# Patient Record
Sex: FEMALE | Race: WHITE | Employment: OTHER | URBAN - NONMETROPOLITAN AREA
[De-identification: names, ages, dates, MRNs, and addresses within clinical notes are randomized per-mention and may not be internally consistent; named-entity substitution may affect disease eponyms.]

---

## 2017-07-31 RX ORDER — LEVOTHYROXINE SODIUM 0.05 MG/1
50 TABLET ORAL DAILY
COMMUNITY
End: 2017-09-27 | Stop reason: DRUGHIGH

## 2017-07-31 RX ORDER — TRIAMTERENE AND HYDROCHLOROTHIAZIDE 37.5; 25 MG/1; MG/1
1 TABLET ORAL DAILY
COMMUNITY
End: 2017-09-27 | Stop reason: ALTCHOICE

## 2017-09-27 ENCOUNTER — HOSPITAL ENCOUNTER (OUTPATIENT)
Dept: PREADMISSION TESTING | Age: 82
Discharge: HOME | End: 2017-09-27
Payer: MEDICARE

## 2017-09-27 VITALS — WEIGHT: 135 LBS | BODY MASS INDEX: 23.05 KG/M2 | HEIGHT: 64 IN

## 2017-09-27 LAB
ANION GAP SERPL CALCULATED.3IONS-SCNC: 13 MMOL/L (ref 7–19)
APTT: 29.9 SEC (ref 26–36.2)
BASOPHILS ABSOLUTE: 0.1 K/UL (ref 0–0.2)
BASOPHILS RELATIVE PERCENT: 1.3 % (ref 0–1)
BUN BLDV-MCNC: 20 MG/DL (ref 8–23)
CALCIUM SERPL-MCNC: 9.9 MG/DL (ref 8.8–10.2)
CHLORIDE BLD-SCNC: 103 MMOL/L (ref 98–111)
CO2: 25 MMOL/L (ref 22–29)
CREAT SERPL-MCNC: 1.1 MG/DL (ref 0.5–0.9)
EKG P AXIS: 47 DEGREES
EKG P-R INTERVAL: 158 MS
EKG Q-T INTERVAL: 390 MS
EKG QRS DURATION: 74 MS
EKG QTC CALCULATION (BAZETT): 402 MS
EKG T AXIS: 22 DEGREES
EOSINOPHILS ABSOLUTE: 0.3 K/UL (ref 0–0.6)
EOSINOPHILS RELATIVE PERCENT: 3.8 % (ref 0–5)
GFR NON-AFRICAN AMERICAN: 47
GLUCOSE BLD-MCNC: 92 MG/DL (ref 74–109)
HCT VFR BLD CALC: 34.9 % (ref 37–47)
HEMOGLOBIN: 10.9 G/DL (ref 12–16)
INR BLD: 0.95 (ref 0.88–1.18)
LYMPHOCYTES ABSOLUTE: 2.1 K/UL (ref 1.1–4.5)
LYMPHOCYTES RELATIVE PERCENT: 29.6 % (ref 20–40)
MCH RBC QN AUTO: 29.1 PG (ref 27–31)
MCHC RBC AUTO-ENTMCNC: 31.2 G/DL (ref 33–37)
MCV RBC AUTO: 93.1 FL (ref 81–99)
MONOCYTES ABSOLUTE: 0.7 K/UL (ref 0–0.9)
MONOCYTES RELATIVE PERCENT: 10.2 % (ref 0–10)
NEUTROPHILS ABSOLUTE: 3.9 K/UL (ref 1.5–7.5)
NEUTROPHILS RELATIVE PERCENT: 54.7 % (ref 50–65)
PDW BLD-RTO: 14.6 % (ref 11.5–14.5)
PLATELET # BLD: 318 K/UL (ref 130–400)
PMV BLD AUTO: 9.7 FL (ref 9.4–12.3)
POTASSIUM SERPL-SCNC: 4.4 MMOL/L (ref 3.5–5)
PROTHROMBIN TIME: 12.6 SEC (ref 12–14.6)
RBC # BLD: 3.75 M/UL (ref 4.2–5.4)
SODIUM BLD-SCNC: 141 MMOL/L (ref 136–145)
WBC # BLD: 7.1 K/UL (ref 4.8–10.8)

## 2017-09-27 PROCEDURE — 87070 CULTURE OTHR SPECIMN AEROBIC: CPT

## 2017-09-27 PROCEDURE — 85025 COMPLETE CBC W/AUTO DIFF WBC: CPT

## 2017-09-27 PROCEDURE — 93005 ELECTROCARDIOGRAM TRACING: CPT

## 2017-09-27 PROCEDURE — 85610 PROTHROMBIN TIME: CPT

## 2017-09-27 PROCEDURE — 80048 BASIC METABOLIC PNL TOTAL CA: CPT

## 2017-09-27 PROCEDURE — 85730 THROMBOPLASTIN TIME PARTIAL: CPT

## 2017-09-27 RX ORDER — LEVOTHYROXINE SODIUM 0.05 MG/1
50 TABLET ORAL DAILY
COMMUNITY
End: 2019-12-02 | Stop reason: SDUPTHER

## 2017-09-27 RX ORDER — LISINOPRIL 40 MG/1
40 TABLET ORAL DAILY
COMMUNITY
End: 2019-04-19

## 2017-09-28 LAB — MRSA CULTURE ONLY: NORMAL

## 2017-09-29 ENCOUNTER — OFFICE VISIT (OUTPATIENT)
Dept: FAMILY MEDICINE CLINIC | Age: 82
End: 2017-09-29
Payer: MEDICARE

## 2017-09-29 VITALS
OXYGEN SATURATION: 96 % | HEART RATE: 85 BPM | WEIGHT: 140 LBS | RESPIRATION RATE: 18 BRPM | SYSTOLIC BLOOD PRESSURE: 138 MMHG | DIASTOLIC BLOOD PRESSURE: 82 MMHG | BODY MASS INDEX: 24.41 KG/M2 | TEMPERATURE: 98.5 F

## 2017-09-29 DIAGNOSIS — Z23 NEED FOR PROPHYLACTIC VACCINATION AGAINST STREPTOCOCCUS PNEUMONIAE (PNEUMOCOCCUS): ICD-10-CM

## 2017-09-29 DIAGNOSIS — I10 ESSENTIAL (PRIMARY) HYPERTENSION: Primary | ICD-10-CM

## 2017-09-29 DIAGNOSIS — H11.32 SUBCONJUNCTIVAL HEMORRHAGE OF LEFT EYE: ICD-10-CM

## 2017-09-29 PROBLEM — M15.9 PRIMARY OSTEOARTHRITIS INVOLVING MULTIPLE JOINTS: Status: ACTIVE | Noted: 2017-09-29

## 2017-09-29 PROBLEM — G89.29 CHRONIC MIDLINE LOW BACK PAIN WITHOUT SCIATICA: Status: ACTIVE | Noted: 2017-09-29

## 2017-09-29 PROBLEM — M54.50 CHRONIC MIDLINE LOW BACK PAIN WITHOUT SCIATICA: Status: ACTIVE | Noted: 2017-09-29

## 2017-09-29 PROBLEM — F33.42 RECURRENT MAJOR DEPRESSIVE DISORDER, IN FULL REMISSION (HCC): Status: ACTIVE | Noted: 2017-09-29

## 2017-09-29 PROBLEM — K21.9 GASTROESOPHAGEAL REFLUX DISEASE WITHOUT ESOPHAGITIS: Status: ACTIVE | Noted: 2017-09-29

## 2017-09-29 PROBLEM — Z78.9 STATIN INTOLERANCE: Status: ACTIVE | Noted: 2017-09-29

## 2017-09-29 PROBLEM — E78.01 FAMILIAL HYPERCHOLESTEROLEMIA: Status: ACTIVE | Noted: 2017-09-29

## 2017-09-29 PROBLEM — E06.3 HYPOTHYROIDISM DUE TO HASHIMOTO'S THYROIDITIS: Status: ACTIVE | Noted: 2017-09-29

## 2017-09-29 PROBLEM — E03.8 HYPOTHYROIDISM DUE TO HASHIMOTO'S THYROIDITIS: Status: ACTIVE | Noted: 2017-09-29

## 2017-09-29 PROBLEM — M15.0 PRIMARY OSTEOARTHRITIS INVOLVING MULTIPLE JOINTS: Status: ACTIVE | Noted: 2017-09-29

## 2017-09-29 PROCEDURE — G8400 PT W/DXA NO RESULTS DOC: HCPCS | Performed by: FAMILY MEDICINE

## 2017-09-29 PROCEDURE — 1123F ACP DISCUSS/DSCN MKR DOCD: CPT | Performed by: FAMILY MEDICINE

## 2017-09-29 PROCEDURE — G8427 DOCREV CUR MEDS BY ELIG CLIN: HCPCS | Performed by: FAMILY MEDICINE

## 2017-09-29 PROCEDURE — G8420 CALC BMI NORM PARAMETERS: HCPCS | Performed by: FAMILY MEDICINE

## 2017-09-29 PROCEDURE — 99213 OFFICE O/P EST LOW 20 MIN: CPT | Performed by: FAMILY MEDICINE

## 2017-09-29 PROCEDURE — 1036F TOBACCO NON-USER: CPT | Performed by: FAMILY MEDICINE

## 2017-09-29 PROCEDURE — 90732 PPSV23 VACC 2 YRS+ SUBQ/IM: CPT | Performed by: FAMILY MEDICINE

## 2017-09-29 PROCEDURE — 4040F PNEUMOC VAC/ADMIN/RCVD: CPT | Performed by: FAMILY MEDICINE

## 2017-09-29 PROCEDURE — 1090F PRES/ABSN URINE INCON ASSESS: CPT | Performed by: FAMILY MEDICINE

## 2017-09-29 PROCEDURE — G0009 ADMIN PNEUMOCOCCAL VACCINE: HCPCS | Performed by: FAMILY MEDICINE

## 2017-09-29 NOTE — MR AVS SNAPSHOT
After Visit Summary             Robert Huitron   2017 2:00 PM   Office Visit    Description:  Female : 1934   Provider:  Adele Doran MD   Department:  [address]              Your Follow-Up and Future Appointments         Below is a list of your follow-up and future appointments. This may not be a complete list as you may have made appointments directly with providers that we are not aware of or your providers may have made some for you. Please call your providers to confirm appointments. It is important to keep your appointments. Please bring your current insurance card, photo ID, co-pay, and all medication bottles to your appointment. If self-pay, payment is expected at the time of service. Your To-Do List     Future Appointments Provider Department Dept Phone    2018 1:00 PM Adele Doran MD [address] [phone]    If this is a sports or school physical please bring the physical form with you. Follow-Up    Return in about 3 months (around 2017) for AWV.          Information from Your Visit        Department     Name Address Phone Fax    [address] [address] [place] [address] [phone]      You Were Seen for:         Comments    Essential (primary) hypertension   [286361]         Vital Signs     Blood Pressure Pulse Temperature Respirations Weight Oxygen Saturation    138/82 85 98.5 °F (36.9 °C) 18 140 lb (63.5 kg) 96%    Body Mass Index Smoking Status                24.41 kg/m2 Former Smoker             Medications and Orders      Your Current Medications Are              lisinopril (PRINIVIL;ZESTRIL) 40 MG tablet Take 40 mg by mouth daily Indications: High Blood Pressure Disorder    levothyroxine (SYNTHROID) 50 MCG tablet Take 50 mcg by mouth Daily Indications: Underactive Thyroid    metoprolol tartrate (LOPRESSOR) 25 MG tablet Take 25 mg by mouth 2 times

## 2017-09-29 NOTE — PROGRESS NOTES
History   Substance Use Topics    Smoking status: Former Smoker     Packs/day: 1.00     Years: 17.00     Quit date: 5/25/1985    Smokeless tobacco: Never Used    Alcohol use Yes      Comment: rare      Current Outpatient Prescriptions   Medication Sig Dispense Refill    lisinopril (PRINIVIL;ZESTRIL) 40 MG tablet Take 40 mg by mouth daily Indications: High Blood Pressure Disorder      levothyroxine (SYNTHROID) 50 MCG tablet Take 50 mcg by mouth Daily Indications: Underactive Thyroid      metoprolol tartrate (LOPRESSOR) 25 MG tablet Take 25 mg by mouth 2 times daily Indications: High Blood Pressure Disorder       omeprazole (PRILOSEC) 40 MG capsule Take 40 mg by mouth daily Indications: Gastroesophageal Reflux Disease       hydrochlorothiazide (HYDRODIURIL) 12.5 MG tablet Take 12.5 mg by mouth daily as needed Indications: Fluid Retention       Acetaminophen (TYLENOL 8 HOUR PO) Take 2 tablets by mouth 3 times daily as needed Indications: Pain       Multiple Vitamin (MULTIVITAMIN PO) Take 1 tablet by mouth daily       DiphenhydrAMINE HCl (BENADRYL ALLERGY PO) Take 1 tablet by mouth daily as needed Indications: Seasonal Allergy        No current facility-administered medications for this visit. Allergies   Allergen Reactions    Demerol Other (See Comments)     Abdominal spasms with severe n/v/    Stadol [Butorphanol Tartrate]     Sulfa Antibiotics        Health Maintenance   Topic Date Due    DTaP/Tdap/Td vaccine (1 - Tdap) [birth year]/19/1953    Zostavax vaccine  03/19/1994    DEXA (modify frequency per FRAX score)  03/19/1999    Flu vaccine (1) 09/01/2017    Pneumococcal low/med risk  Completed       Subjective:      Review of Systems   Constitutional: Negative for chills and fever. HENT: Negative for congestion. Eyes: Negative for photophobia, pain, discharge, itching and visual disturbance. Respiratory: Negative for cough, chest tightness and shortness of breath.     Cardiovascular: Negative for chest pain, palpitations and leg swelling. Gastrointestinal: Negative for abdominal pain, anal bleeding, constipation, diarrhea and nausea. Genitourinary: Negative for difficulty urinating. Psychiatric/Behavioral: Negative. See HPI for visit specific review of symptoms. All others negative      Objective:   /82  Pulse 85  Temp 98.5 °F (36.9 °C)  Resp 18  Wt 140 lb (63.5 kg)  SpO2 96%  BMI 24.41 kg/m2  Physical Exam   Constitutional: She appears well-developed. She does not appear ill. Eyes: EOM and lids are normal. Pupils are equal, round, and reactive to light. Lids are everted and swept, no foreign bodies found. Right conjunctiva has no hemorrhage. Left conjunctiva has a hemorrhage. Neck: Normal range of motion. Neck supple. Cardiovascular: Normal rate and regular rhythm. Exam reveals no friction rub. No murmur heard. Pulmonary/Chest: Effort normal and breath sounds normal. No respiratory distress. She has no wheezes. She has no rales. Abdominal: Soft. Bowel sounds are normal. She exhibits no distension. There is no tenderness. There is no rebound and no guarding. Musculoskeletal: She exhibits no edema. Neurological: She is alert. Psychiatric: She has a normal mood and affect.  Her behavior is normal.         Recent Results (from the past 672 hour(s))   MRSA SCREENING CULTURE ONLY    Collection Time: 09/27/17 11:40 AM   Result Value Ref Range    MRSA Culture Only No MRSA detected on culture    CBC Auto Differential    Collection Time: 09/27/17 11:40 AM   Result Value Ref Range    WBC 7.1 4.8 - 10.8 K/uL    RBC 3.75 (L) 4.20 - 5.40 M/uL    Hemoglobin 10.9 (L) 12.0 - 16.0 g/dL    Hematocrit 34.9 (L) 37.0 - 47.0 %    MCV 93.1 81.0 - 99.0 fL    MCH 29.1 27.0 - 31.0 pg    MCHC 31.2 (L) 33.0 - 37.0 g/dL    RDW 14.6 (H) 11.5 - 14.5 %    Platelets 952 756 - 752 K/uL    MPV 9.7 9.4 - 12.3 fL    Neutrophils % 54.7 50.0 - 65.0 %    Lymphocytes % 29.6 20.0 - 40.0 % hemorrhage. It appears to be healing. The eyelid bruising is resolving as well. She has no other ocular symptoms including eye pain blurry vision or extraocular movement issues          Return in about 3 months (around 12/29/2017) for AWV. Discussed use, benefit, and side effects of prescribed medications. All patient questions answered. Pt voiced understanding. Reviewed health maintenance. Instructed to continue current medications, diet and exercise. Patient agreed with treatment plan. Follow up as directed.

## 2017-10-03 ASSESSMENT — ENCOUNTER SYMPTOMS
EYE ITCHING: 0
COUGH: 0
NAUSEA: 0
CONSTIPATION: 0
SHORTNESS OF BREATH: 0
EYE DISCHARGE: 0
ABDOMINAL PAIN: 0
EYE PAIN: 0
ANAL BLEEDING: 0
PHOTOPHOBIA: 0
CHEST TIGHTNESS: 0
DIARRHEA: 0

## 2017-10-17 ENCOUNTER — HOSPITAL ENCOUNTER (INPATIENT)
Age: 82
LOS: 1 days | Discharge: HOME HEALTH | DRG: 470 | End: 2017-10-18
Attending: ORTHOPAEDIC SURGERY | Admitting: ORTHOPAEDIC SURGERY
Payer: MEDICARE

## 2017-10-17 ENCOUNTER — ANESTHESIA EVENT (OUTPATIENT)
Dept: OPERATING ROOM | Age: 82
DRG: 470 | End: 2017-10-17
Payer: MEDICARE

## 2017-10-17 ENCOUNTER — ANESTHESIA (OUTPATIENT)
Dept: OPERATING ROOM | Age: 82
DRG: 470 | End: 2017-10-17
Payer: MEDICARE

## 2017-10-17 VITALS
OXYGEN SATURATION: 100 % | DIASTOLIC BLOOD PRESSURE: 54 MMHG | TEMPERATURE: 94.5 F | SYSTOLIC BLOOD PRESSURE: 118 MMHG | RESPIRATION RATE: 2 BRPM

## 2017-10-17 PROBLEM — D50.9 IRON DEFICIENCY ANEMIA: Status: ACTIVE | Noted: 2017-10-17

## 2017-10-17 LAB
ABO/RH: NORMAL
ANTIBODY SCREEN: NORMAL

## 2017-10-17 PROCEDURE — 3600000005 HC SURGERY LEVEL 5 BASE: Performed by: ORTHOPAEDIC SURGERY

## 2017-10-17 PROCEDURE — 3600000015 HC SURGERY LEVEL 5 ADDTL 15MIN: Performed by: ORTHOPAEDIC SURGERY

## 2017-10-17 PROCEDURE — C1776 JOINT DEVICE (IMPLANTABLE): HCPCS | Performed by: ORTHOPAEDIC SURGERY

## 2017-10-17 PROCEDURE — 6370000000 HC RX 637 (ALT 250 FOR IP): Performed by: ORTHOPAEDIC SURGERY

## 2017-10-17 PROCEDURE — 2720000001 HC MISC SURG SUPPLY STERILE $51-500: Performed by: ORTHOPAEDIC SURGERY

## 2017-10-17 PROCEDURE — 1210000000 HC MED SURG R&B

## 2017-10-17 PROCEDURE — A4364 ADHESIVE, LIQUID OR EQUAL: HCPCS | Performed by: ORTHOPAEDIC SURGERY

## 2017-10-17 PROCEDURE — C1713 ANCHOR/SCREW BN/BN,TIS/BN: HCPCS | Performed by: ORTHOPAEDIC SURGERY

## 2017-10-17 PROCEDURE — 6360000002 HC RX W HCPCS: Performed by: NURSE ANESTHETIST, CERTIFIED REGISTERED

## 2017-10-17 PROCEDURE — 2500000003 HC RX 250 WO HCPCS: Performed by: NURSE ANESTHETIST, CERTIFIED REGISTERED

## 2017-10-17 PROCEDURE — 94664 DEMO&/EVAL PT USE INHALER: CPT

## 2017-10-17 PROCEDURE — 2580000003 HC RX 258: Performed by: ORTHOPAEDIC SURGERY

## 2017-10-17 PROCEDURE — 86900 BLOOD TYPING SEROLOGIC ABO: CPT

## 2017-10-17 PROCEDURE — 3700000000 HC ANESTHESIA ATTENDED CARE: Performed by: ORTHOPAEDIC SURGERY

## 2017-10-17 PROCEDURE — 7100000000 HC PACU RECOVERY - FIRST 15 MIN: Performed by: ORTHOPAEDIC SURGERY

## 2017-10-17 PROCEDURE — 0SRD0J9 REPLACEMENT OF LEFT KNEE JOINT WITH SYNTHETIC SUBSTITUTE, CEMENTED, OPEN APPROACH: ICD-10-PCS | Performed by: ORTHOPAEDIC SURGERY

## 2017-10-17 PROCEDURE — 36415 COLL VENOUS BLD VENIPUNCTURE: CPT

## 2017-10-17 PROCEDURE — 6360000002 HC RX W HCPCS: Performed by: ORTHOPAEDIC SURGERY

## 2017-10-17 PROCEDURE — 97116 GAIT TRAINING THERAPY: CPT

## 2017-10-17 PROCEDURE — G8978 MOBILITY CURRENT STATUS: HCPCS

## 2017-10-17 PROCEDURE — 7100000001 HC PACU RECOVERY - ADDTL 15 MIN: Performed by: ORTHOPAEDIC SURGERY

## 2017-10-17 PROCEDURE — G8979 MOBILITY GOAL STATUS: HCPCS

## 2017-10-17 PROCEDURE — 2700000000 HC OXYGEN THERAPY PER DAY

## 2017-10-17 PROCEDURE — 2720000003 HC MISC SUTURE/STAPLES/RELOADS/ETC: Performed by: ORTHOPAEDIC SURGERY

## 2017-10-17 PROCEDURE — 3700000001 HC ADD 15 MINUTES (ANESTHESIA): Performed by: ORTHOPAEDIC SURGERY

## 2017-10-17 PROCEDURE — 2500000003 HC RX 250 WO HCPCS: Performed by: ORTHOPAEDIC SURGERY

## 2017-10-17 PROCEDURE — 86901 BLOOD TYPING SEROLOGIC RH(D): CPT

## 2017-10-17 PROCEDURE — 86850 RBC ANTIBODY SCREEN: CPT

## 2017-10-17 PROCEDURE — 97161 PT EVAL LOW COMPLEX 20 MIN: CPT

## 2017-10-17 DEVICE — DUP USE 338453 IMPL KNEE PSN ALL POLY PAT PLY 29MM: Type: IMPLANTABLE DEVICE | Site: KNEE | Status: FUNCTIONAL

## 2017-10-17 DEVICE — PSN TIB STM 5 DEG SZ C L: Type: IMPLANTABLE DEVICE | Site: KNEE | Status: FUNCTIONAL

## 2017-10-17 DEVICE — COMPONENT FEM SZ 7 NAR L KNEE CO CHROM CEM POST STBL COR: Type: IMPLANTABLE DEVICE | Site: KNEE | Status: FUNCTIONAL

## 2017-10-17 DEVICE — DISCONTINUED USE 416978 CEMENT PALACOS R SING DOSE 40GR: Type: IMPLANTABLE DEVICE | Site: KNEE | Status: FUNCTIONAL

## 2017-10-17 DEVICE — COMPONENT ARTC SURF PS 6-9 CD 12 MM LT TIB FIX BEAR: Type: IMPLANTABLE DEVICE | Site: KNEE | Status: FUNCTIONAL

## 2017-10-17 RX ORDER — HYDROCHLOROTHIAZIDE 25 MG/1
12.5 TABLET ORAL DAILY
Status: DISCONTINUED | OUTPATIENT
Start: 2017-10-17 | End: 2017-10-18 | Stop reason: HOSPADM

## 2017-10-17 RX ORDER — MORPHINE SULFATE 4 MG/ML
2 INJECTION, SOLUTION INTRAMUSCULAR; INTRAVENOUS EVERY 5 MIN PRN
Status: DISCONTINUED | OUTPATIENT
Start: 2017-10-17 | End: 2017-10-17 | Stop reason: HOSPADM

## 2017-10-17 RX ORDER — ACETAMINOPHEN 325 MG/1
650 TABLET ORAL EVERY 4 HOURS PRN
Status: DISCONTINUED | OUTPATIENT
Start: 2017-10-17 | End: 2017-10-18 | Stop reason: HOSPADM

## 2017-10-17 RX ORDER — ENALAPRILAT 2.5 MG/2ML
1.25 INJECTION INTRAVENOUS
Status: DISCONTINUED | OUTPATIENT
Start: 2017-10-17 | End: 2017-10-17 | Stop reason: HOSPADM

## 2017-10-17 RX ORDER — OMEPRAZOLE 40 MG/1
40 CAPSULE, DELAYED RELEASE ORAL DAILY
Status: DISCONTINUED | OUTPATIENT
Start: 2017-10-17 | End: 2017-10-17 | Stop reason: CLARIF

## 2017-10-17 RX ORDER — MORPHINE SULFATE 4 MG/ML
2 INJECTION, SOLUTION INTRAMUSCULAR; INTRAVENOUS
Status: DISCONTINUED | OUTPATIENT
Start: 2017-10-17 | End: 2017-10-18 | Stop reason: HOSPADM

## 2017-10-17 RX ORDER — SODIUM CHLORIDE 0.9 % (FLUSH) 0.9 %
10 SYRINGE (ML) INJECTION EVERY 12 HOURS SCHEDULED
Status: DISCONTINUED | OUTPATIENT
Start: 2017-10-17 | End: 2017-10-18 | Stop reason: HOSPADM

## 2017-10-17 RX ORDER — HYDRALAZINE HYDROCHLORIDE 20 MG/ML
5 INJECTION INTRAMUSCULAR; INTRAVENOUS EVERY 10 MIN PRN
Status: DISCONTINUED | OUTPATIENT
Start: 2017-10-17 | End: 2017-10-17 | Stop reason: HOSPADM

## 2017-10-17 RX ORDER — ASPIRIN 81 MG/1
81 TABLET ORAL 2 TIMES DAILY
Status: DISCONTINUED | OUTPATIENT
Start: 2017-10-17 | End: 2017-10-18 | Stop reason: HOSPADM

## 2017-10-17 RX ORDER — SCOLOPAMINE TRANSDERMAL SYSTEM 1 MG/1
1 PATCH, EXTENDED RELEASE TRANSDERMAL
Status: DISCONTINUED | OUTPATIENT
Start: 2017-10-17 | End: 2017-10-17

## 2017-10-17 RX ORDER — DOCUSATE SODIUM 100 MG/1
100 CAPSULE, LIQUID FILLED ORAL 2 TIMES DAILY
Status: DISCONTINUED | OUTPATIENT
Start: 2017-10-17 | End: 2017-10-18 | Stop reason: HOSPADM

## 2017-10-17 RX ORDER — SODIUM CHLORIDE 0.9 % (FLUSH) 0.9 %
10 SYRINGE (ML) INJECTION EVERY 12 HOURS SCHEDULED
Status: DISCONTINUED | OUTPATIENT
Start: 2017-10-17 | End: 2017-10-17 | Stop reason: HOSPADM

## 2017-10-17 RX ORDER — GLYCOPYRROLATE 0.2 MG/ML
INJECTION INTRAMUSCULAR; INTRAVENOUS PRN
Status: DISCONTINUED | OUTPATIENT
Start: 2017-10-17 | End: 2017-10-17 | Stop reason: SDUPTHER

## 2017-10-17 RX ORDER — LIDOCAINE HYDROCHLORIDE 10 MG/ML
1 INJECTION, SOLUTION EPIDURAL; INFILTRATION; INTRACAUDAL; PERINEURAL
Status: DISCONTINUED | OUTPATIENT
Start: 2017-10-17 | End: 2017-10-17 | Stop reason: HOSPADM

## 2017-10-17 RX ORDER — FENTANYL CITRATE 50 UG/ML
INJECTION, SOLUTION INTRAMUSCULAR; INTRAVENOUS PRN
Status: DISCONTINUED | OUTPATIENT
Start: 2017-10-17 | End: 2017-10-17 | Stop reason: SDUPTHER

## 2017-10-17 RX ORDER — SODIUM CHLORIDE 0.9 % (FLUSH) 0.9 %
10 SYRINGE (ML) INJECTION PRN
Status: DISCONTINUED | OUTPATIENT
Start: 2017-10-17 | End: 2017-10-17 | Stop reason: HOSPADM

## 2017-10-17 RX ORDER — MIDAZOLAM HYDROCHLORIDE 1 MG/ML
2 INJECTION INTRAMUSCULAR; INTRAVENOUS
Status: DISCONTINUED | OUTPATIENT
Start: 2017-10-17 | End: 2017-10-17 | Stop reason: HOSPADM

## 2017-10-17 RX ORDER — TRANEXAMIC ACID 650 1/1
1950 TABLET ORAL ONCE
Status: COMPLETED | OUTPATIENT
Start: 2017-10-17 | End: 2017-10-17

## 2017-10-17 RX ORDER — OXYCODONE HYDROCHLORIDE AND ACETAMINOPHEN 5; 325 MG/1; MG/1
1 TABLET ORAL EVERY 4 HOURS PRN
Status: DISCONTINUED | OUTPATIENT
Start: 2017-10-17 | End: 2017-10-18 | Stop reason: HOSPADM

## 2017-10-17 RX ORDER — ACETAMINOPHEN 500 MG
1000 TABLET ORAL ONCE
Status: COMPLETED | OUTPATIENT
Start: 2017-10-17 | End: 2017-10-17

## 2017-10-17 RX ORDER — DEXAMETHASONE SODIUM PHOSPHATE 10 MG/ML
10 INJECTION INTRAMUSCULAR; INTRAVENOUS ONCE
Status: DISCONTINUED | OUTPATIENT
Start: 2017-10-17 | End: 2017-10-17

## 2017-10-17 RX ORDER — ROPIVACAINE HYDROCHLORIDE 5 MG/ML
INJECTION, SOLUTION EPIDURAL; INFILTRATION; PERINEURAL PRN
Status: DISCONTINUED | OUTPATIENT
Start: 2017-10-17 | End: 2017-10-17 | Stop reason: HOSPADM

## 2017-10-17 RX ORDER — ONDANSETRON 2 MG/ML
4 INJECTION INTRAMUSCULAR; INTRAVENOUS EVERY 6 HOURS PRN
Status: DISCONTINUED | OUTPATIENT
Start: 2017-10-17 | End: 2017-10-18 | Stop reason: HOSPADM

## 2017-10-17 RX ORDER — PANTOPRAZOLE SODIUM 40 MG/1
40 TABLET, DELAYED RELEASE ORAL
Status: DISCONTINUED | OUTPATIENT
Start: 2017-10-18 | End: 2017-10-18 | Stop reason: HOSPADM

## 2017-10-17 RX ORDER — SODIUM CHLORIDE 9 MG/ML
INJECTION, SOLUTION INTRAVENOUS CONTINUOUS
Status: DISCONTINUED | OUTPATIENT
Start: 2017-10-17 | End: 2017-10-18 | Stop reason: HOSPADM

## 2017-10-17 RX ORDER — 0.9 % SODIUM CHLORIDE 0.9 %
500 INTRAVENOUS SOLUTION INTRAVENOUS PRN
Status: DISCONTINUED | OUTPATIENT
Start: 2017-10-17 | End: 2017-10-18 | Stop reason: HOSPADM

## 2017-10-17 RX ORDER — PROPOFOL 10 MG/ML
INJECTION, EMULSION INTRAVENOUS PRN
Status: DISCONTINUED | OUTPATIENT
Start: 2017-10-17 | End: 2017-10-17 | Stop reason: SDUPTHER

## 2017-10-17 RX ORDER — ROCURONIUM BROMIDE 10 MG/ML
INJECTION, SOLUTION INTRAVENOUS PRN
Status: DISCONTINUED | OUTPATIENT
Start: 2017-10-17 | End: 2017-10-17 | Stop reason: SDUPTHER

## 2017-10-17 RX ORDER — DIPHENHYDRAMINE HYDROCHLORIDE 50 MG/ML
12.5 INJECTION INTRAMUSCULAR; INTRAVENOUS
Status: DISCONTINUED | OUTPATIENT
Start: 2017-10-17 | End: 2017-10-17 | Stop reason: HOSPADM

## 2017-10-17 RX ORDER — OXYCODONE HCL 10 MG/1
10 TABLET, FILM COATED, EXTENDED RELEASE ORAL ONCE
Status: COMPLETED | OUTPATIENT
Start: 2017-10-17 | End: 2017-10-17

## 2017-10-17 RX ORDER — SODIUM CHLORIDE, SODIUM LACTATE, POTASSIUM CHLORIDE, CALCIUM CHLORIDE 600; 310; 30; 20 MG/100ML; MG/100ML; MG/100ML; MG/100ML
INJECTION, SOLUTION INTRAVENOUS CONTINUOUS
Status: DISCONTINUED | OUTPATIENT
Start: 2017-10-17 | End: 2017-10-17

## 2017-10-17 RX ORDER — PROMETHAZINE HYDROCHLORIDE 25 MG/ML
6.25 INJECTION, SOLUTION INTRAMUSCULAR; INTRAVENOUS
Status: DISCONTINUED | OUTPATIENT
Start: 2017-10-17 | End: 2017-10-17 | Stop reason: HOSPADM

## 2017-10-17 RX ORDER — MORPHINE SULFATE 4 MG/ML
4 INJECTION, SOLUTION INTRAMUSCULAR; INTRAVENOUS
Status: DISCONTINUED | OUTPATIENT
Start: 2017-10-17 | End: 2017-10-18 | Stop reason: HOSPADM

## 2017-10-17 RX ORDER — ONDANSETRON 2 MG/ML
INJECTION INTRAMUSCULAR; INTRAVENOUS PRN
Status: DISCONTINUED | OUTPATIENT
Start: 2017-10-17 | End: 2017-10-17 | Stop reason: SDUPTHER

## 2017-10-17 RX ORDER — EPHEDRINE SULFATE 50 MG/ML
INJECTION, SOLUTION INTRAVENOUS PRN
Status: DISCONTINUED | OUTPATIENT
Start: 2017-10-17 | End: 2017-10-17 | Stop reason: SDUPTHER

## 2017-10-17 RX ORDER — SODIUM CHLORIDE 0.9 % (FLUSH) 0.9 %
10 SYRINGE (ML) INJECTION PRN
Status: DISCONTINUED | OUTPATIENT
Start: 2017-10-17 | End: 2017-10-18 | Stop reason: HOSPADM

## 2017-10-17 RX ORDER — METOCLOPRAMIDE HYDROCHLORIDE 5 MG/ML
10 INJECTION INTRAMUSCULAR; INTRAVENOUS
Status: DISCONTINUED | OUTPATIENT
Start: 2017-10-17 | End: 2017-10-17 | Stop reason: HOSPADM

## 2017-10-17 RX ORDER — LEVOTHYROXINE SODIUM 0.03 MG/1
50 TABLET ORAL DAILY
Status: DISCONTINUED | OUTPATIENT
Start: 2017-10-17 | End: 2017-10-18 | Stop reason: HOSPADM

## 2017-10-17 RX ORDER — DEXAMETHASONE SODIUM PHOSPHATE 10 MG/ML
INJECTION INTRAMUSCULAR; INTRAVENOUS PRN
Status: DISCONTINUED | OUTPATIENT
Start: 2017-10-17 | End: 2017-10-17 | Stop reason: SDUPTHER

## 2017-10-17 RX ORDER — MORPHINE SULFATE 4 MG/ML
4 INJECTION, SOLUTION INTRAMUSCULAR; INTRAVENOUS EVERY 5 MIN PRN
Status: DISCONTINUED | OUTPATIENT
Start: 2017-10-17 | End: 2017-10-17 | Stop reason: HOSPADM

## 2017-10-17 RX ORDER — OXYCODONE HYDROCHLORIDE AND ACETAMINOPHEN 5; 325 MG/1; MG/1
2 TABLET ORAL EVERY 4 HOURS PRN
Status: DISCONTINUED | OUTPATIENT
Start: 2017-10-17 | End: 2017-10-18 | Stop reason: HOSPADM

## 2017-10-17 RX ORDER — LABETALOL HYDROCHLORIDE 5 MG/ML
5 INJECTION, SOLUTION INTRAVENOUS EVERY 10 MIN PRN
Status: DISCONTINUED | OUTPATIENT
Start: 2017-10-17 | End: 2017-10-17 | Stop reason: HOSPADM

## 2017-10-17 RX ADMIN — CEFAZOLIN SODIUM 2 G: 2 SOLUTION INTRAVENOUS at 08:32

## 2017-10-17 RX ADMIN — ONDANSETRON HYDROCHLORIDE 4 MG: 2 SOLUTION INTRAMUSCULAR; INTRAVENOUS at 09:16

## 2017-10-17 RX ADMIN — TRANEXAMIC ACID 1950 MG: 650 TABLET ORAL at 07:15

## 2017-10-17 RX ADMIN — HYDROMORPHONE HYDROCHLORIDE 0.5 MG: 1 INJECTION, SOLUTION INTRAMUSCULAR; INTRAVENOUS; SUBCUTANEOUS at 09:50

## 2017-10-17 RX ADMIN — ROCURONIUM BROMIDE 50 MG: 10 INJECTION INTRAVENOUS at 08:36

## 2017-10-17 RX ADMIN — SODIUM CHLORIDE: 9 INJECTION, SOLUTION INTRAVENOUS at 20:40

## 2017-10-17 RX ADMIN — OXYCODONE HYDROCHLORIDE 10 MG: 10 TABLET, FILM COATED, EXTENDED RELEASE ORAL at 07:15

## 2017-10-17 RX ADMIN — EPHEDRINE SULFATE 10 MG: 50 INJECTION, SOLUTION INTRAMUSCULAR; INTRAVENOUS; SUBCUTANEOUS at 08:48

## 2017-10-17 RX ADMIN — ASPIRIN 81 MG: 81 TABLET, COATED ORAL at 13:41

## 2017-10-17 RX ADMIN — DOCUSATE SODIUM 100 MG: 100 CAPSULE, LIQUID FILLED ORAL at 13:41

## 2017-10-17 RX ADMIN — SUGAMMADEX 125 MG: 100 INJECTION, SOLUTION INTRAVENOUS at 09:23

## 2017-10-17 RX ADMIN — DEXAMETHASONE SODIUM PHOSPHATE 10 MG: 10 INJECTION INTRAMUSCULAR; INTRAVENOUS at 08:36

## 2017-10-17 RX ADMIN — DOCUSATE SODIUM 100 MG: 100 CAPSULE, LIQUID FILLED ORAL at 22:03

## 2017-10-17 RX ADMIN — GLYCOPYRROLATE 0.2 MG: 0.2 INJECTION, SOLUTION INTRAMUSCULAR; INTRAVENOUS at 09:35

## 2017-10-17 RX ADMIN — SODIUM CHLORIDE: 9 INJECTION, SOLUTION INTRAVENOUS at 11:35

## 2017-10-17 RX ADMIN — ACETAMINOPHEN 1000 MG: 500 TABLET, FILM COATED ORAL at 07:15

## 2017-10-17 RX ADMIN — HYDROCHLOROTHIAZIDE 12.5 MG: 25 TABLET ORAL at 13:41

## 2017-10-17 RX ADMIN — METOPROLOL TARTRATE 25 MG: 25 TABLET ORAL at 22:02

## 2017-10-17 RX ADMIN — PROPOFOL 140 MG: 10 INJECTION, EMULSION INTRAVENOUS at 08:35

## 2017-10-17 RX ADMIN — HYDROMORPHONE HYDROCHLORIDE 0.5 MG: 1 INJECTION, SOLUTION INTRAMUSCULAR; INTRAVENOUS; SUBCUTANEOUS at 09:16

## 2017-10-17 RX ADMIN — Medication 10 ML: at 22:03

## 2017-10-17 RX ADMIN — OXYCODONE HYDROCHLORIDE AND ACETAMINOPHEN 2 TABLET: 5; 325 TABLET ORAL at 22:01

## 2017-10-17 RX ADMIN — CEFAZOLIN SODIUM 2 G: 2 SOLUTION INTRAVENOUS at 17:32

## 2017-10-17 RX ADMIN — FENTANYL CITRATE 100 MCG: 50 INJECTION, SOLUTION INTRAMUSCULAR; INTRAVENOUS at 08:32

## 2017-10-17 RX ADMIN — OXYCODONE HYDROCHLORIDE AND ACETAMINOPHEN 2 TABLET: 5; 325 TABLET ORAL at 13:42

## 2017-10-17 RX ADMIN — ASPIRIN 81 MG: 81 TABLET, COATED ORAL at 22:02

## 2017-10-17 RX ADMIN — SODIUM CHLORIDE, POTASSIUM CHLORIDE, SODIUM LACTATE AND CALCIUM CHLORIDE: 600; 310; 30; 20 INJECTION, SOLUTION INTRAVENOUS at 06:59

## 2017-10-17 ASSESSMENT — PAIN SCALES - GENERAL
PAINLEVEL_OUTOF10: 8
PAINLEVEL_OUTOF10: 4
PAINLEVEL_OUTOF10: 8

## 2017-10-17 NOTE — ANESTHESIA PRE PROCEDURE
Department of Anesthesiology  Preprocedure Note       Name:  Claudeen Quant   Age:  80 y.o.  :  1934                                          MRN:  [id number]         Date:  10/17/2017      Surgeon: Sonam Roldan):  Johanny Duarte MD    Procedure: Procedure(s):  KNEE TOTAL ARTHROPLASTY    Medications prior to admission:   Prior to Admission medications    Medication Sig Start Date End Date Taking?  Authorizing Provider   lisinopril (PRINIVIL;ZESTRIL) 40 MG tablet Take 40 mg by mouth daily Indications: High Blood Pressure Disorder    Historical Provider, MD   levothyroxine (SYNTHROID) 50 MCG tablet Take 50 mcg by mouth Daily Indications: Underactive Thyroid    Historical Provider, MD   metoprolol tartrate (LOPRESSOR) 25 MG tablet Take 25 mg by mouth 2 times daily Indications: High Blood Pressure Disorder     Historical Provider, MD   omeprazole (PRILOSEC) 40 MG capsule Take 40 mg by mouth daily Indications: Gastroesophageal Reflux Disease  16   Historical Provider, MD   hydrochlorothiazide (HYDRODIURIL) 12.5 MG tablet Take 12.5 mg by mouth daily as needed Indications: Fluid Retention  16   Historical Provider, MD   Acetaminophen (TYLENOL 8 HOUR PO) Take 2 tablets by mouth 3 times daily as needed Indications: Pain     Historical Provider, MD   Multiple Vitamin (MULTIVITAMIN PO) Take 1 tablet by mouth daily     Historical Provider, MD   DiphenhydrAMINE HCl (BENADRYL ALLERGY PO) Take 1 tablet by mouth daily as needed Indications: Seasonal Allergy     Historical Provider, MD       Current medications:    Current Facility-Administered Medications   Medication Dose Route Frequency Provider Last Rate Last Dose    ceFAZolin (ANCEF) 2 g in dextrose 3 % 50 mL IVPB (duplex)  2 g Intravenous Once Johanny Duarte MD        oxyCODONE (OXYCONTIN) extended release tablet 10 mg  10 mg Oral Once Johanny Duarte MD        lactated ringers infusion   Intravenous Continuous Jose Antonio Destin I  TM distance: >3 FB   Neck ROM: full  Mouth opening: > = 3 FB Dental:    (+) upper dentures      Pulmonary:negative ROS and normal exam                               Cardiovascular:    (+) hypertension:,       ECG reviewed               Beta Blocker:  Dose within 24 Hrs         Neuro/Psych:   {neg ROS              GI/Hepatic/Renal:   (+) GERD: well controlled,           Endo/Other:    (+) hypothyroidism: arthritis:. Abdominal:           Vascular:                                      Anesthesia Plan      spinal     ASA 2           MIPS: Postoperative opioids intended and Prophylactic antiemetics administered. Anesthetic plan and risks discussed with patient and spouse. Plan discussed with CRNA.                   Reji Lux MD   10/17/2017

## 2017-10-17 NOTE — PROGRESS NOTES
Physical Therapy    [facility]  [id number]       10/17/17 1529   Lower Extremity Weight Bearing Restrictions   Left Lower Extremity Weight Bearing Weight Bearing As Tolerated   General   Diagnosis L TKR   Subjective   Subjective Pt willing to sit up in chair   Transfers   Sit to Stand Contact guard assistance;Stand by assistance   Bed to Chair Stand by assistance;Contact guard assistance   Ambulation 1   Device Rolling Juan Miguel 53 guard assistance;Stand by assistance   Quality of Gait GOOD WEIGHTBEARING   Distance 10'   Balance   Sitting - Dynamic Good   Standing - Dynamic Good   Short term goals   Time Frame for Short term goals 14 DAYS   Short term goal 1 BED MOB INDEPENDENT   Short term goal 2 TRANSFERS INDEPENDENT   Short term goal 3 ' RW MOD IND   Short term goal 4 UP/DOWN 3 STEPS CGA   Conditions Requiring Skilled Therapeutic Intervention   Body structures, Functions, Activity limitations Decreased functional mobility ; Decreased strength;Decreased ROM; Decreased balance   Discharge Recommendations Continue to assess pending progress   Activity Tolerance   Activity Tolerance Patient Tolerated treatment well   Plan   Times per week AT LEAST 7   Current Treatment Recommendations ROM; Strengthening;Balance Training;Functional Mobility Training;Transfer Training;Gait Training;Stair training;Patient/Caregiver Education & Training; Safety Education & Training     Electronically signed by Shaan Chan PT on 10/17/2017 at 3:30 PM

## 2017-10-17 NOTE — OP NOTE
TOTAL KNEE ARTHROPLASTY OPERATIVE NOTE    NAME OF SURGEON / : Carmina Evans MD  PATIENT:   Mumtaz Haq  Date: 10/17/2017        Time: 9:49 AM   Referring Physician: ________________________    PREOP DIAGNOSIS:  left Knee  Primary osteoarthritis   POSTOP DIAGNOSIS:  Same     PROCEDURE:    Left  Cemented Posterior Stabilized Knee arthroplasty    IMPLANTS:   Implant Name Type Inv. Item Serial No.  Lot No. LRB No. Used   CEMENT PALACOS R SING DOSE 40GR Cement CEMENT PALACOS R SING DOSE 40GR  Gabriella Quach [id number] Left 2   IMPL KNEE TIB PERSONA LFT 5 DEG STEM SZ C Knee IMPL KNEE TIB PERSONA LFT 5 DEG STEM SZ C  [employer] [id number] Left 1   IMPL KNEE PSN ALL POLY PAT PLY 29MM Knee IMPL KNEE PSN ALL POLY PAT PLY 29MM  [employer] [id number] Left 1   IMPL KNEE PSN FEM CMT CCR NRW SZ7 L Knee IMPL KNEE PSN FEM CMT CCR NRW SZ7 L  [employer] [id number] Left 1   ARTIC 12MM LT Knee ARTIC 12MM LT   [employer] [id number] Left 1       FINDINGS:  Preop ROM:  Full except for   - plus five degrees hyper  extension  Alignment:    valgus  Cartilage wear:  Medial  moderate  Lateral  severe  Pat-fem severe  ACL Intact  ASSISTANT: DIONNE Joseph helped with the entire procedure. He helped to position the patient, retract soft tissues, and performed some of the steps of the procedure. He assisted with closure of the capsule, subcutaneous tissue, and the skin. ANESTHESIA:  General  EBL:  500 mL  TOURNIQUET:  45 minutes  FLUIDS: See anesthesia record  BLOOD PRODUCTS:  None  COMPLICATIONS:  None  SPECIMEN:  None            INDICATIONS:  Patient presents for the above procedure having failed conservative treatment. Patient consents to the procedure above understanding the risks of bleeding, infection, anesthesia, nerve injury, stiffness, and blood clots. PROCEDURE:  The patient was brought to the operating room and placed in a supine position on the operating table. Anesthesia as specified above was placed. An antiobiotic was given IV. The unoperated extremities were well padded. A tourniquet was placed around the proximal thigh. The lower extremity was prepped with chlorhexidene/alcohol and draped sterilely using ioband barriers. A time out was performed. The leg was exsanguinated with an ace wrap and the tourniquet inflated to 300 mm Hg. An anterior knee incision was made and fasciocutaneous flaps were developed. A mini midvastus approach was made and the patella subluxed. The prepatellar fat pad, ACL, and the anterior horns of the menisci were excised. The AP femoral axis was marked with electrocautery. A starter drill was placed down the femoral shaft 1 cm anterior to the PCL origin. An alignment godfrey was placed down the femoral shaft. The distal femoral cutting guide, set at 5 degrees of valgus  was then placed over the alignment godfrey, and pinned perpendicular to the AP axis. The cutting block was then set to remove an extra 1 mm of distal femur and the distal cut made. Hohmans and a PCL retractor were placed around the tibia. The external alignment guide set to cut 10 mm off the intact side at 3° degrees posterior slope was pinned in place. I stepped back and verified that the guide followed the anterior cortex of the tibia to the ankle. The tibial cut was made. The tibial fragment and osteophytes were removed. Posterior meniscal horns were resected. The extension gap was satisfactory. The epicondylar axis was marked with electrocautery. Femoral trials were laid on the distal femur to estimate the appropriate size. The femoral sizer, with posterior paddles set at 3 degrees of external rotation, and an anterior stylus was placed. The sizer reading matched the size predicted by the trial.   The pinholes were drilled for the 4 in 1 femoral cutting block. This block was impacted on the distal femur and sat flush with the proper rotation relative to the AP and epicondylar axes.   A drill was advanced through the anterior slot and did not notch. The remaining femoral cuts were made. Femoral osteophytes were removed with a rongeur. The flexion gap was satisfactory. Posterior femoral osteophytes were removed with a 3/4 inch curved osteotome and rongeur. The appropriate tibial post punch guide covered the tibia without overhang and sat flush. It was lined up with the median third of the tibial tubercle. The tibia was reamed, then the keel impacted in place. The femoral trial was impacted onto the femur. A 10 mm thick, posterior stabilized tibial liner was snapped in place and ROM and stability were checked. The knee had full ROM and symmetric varus/valgus stability in flexion and extension after    *release of  NO RELEASES  * no extra resection was needed   *the appropriate size tibial liner was placed (see above)    Note: Stability to varus/valgus stress(mm):  Extension ( 2   ,   2 )  Flexion (  2  ,  1  )  The tibial trial rotation, which lined up with the medial third of the tibial tubercle, was marked on the proximal tibia with electrocautery. The maximum patella thickness was 23 mm. The patella as resected with an oscillating saw and consistent thickness verified with caliper. The trial patella was placed and the overall thickness was restored. The lateral patellar facet was resected with a saw. The patella tracked normally. A femoral  bone plug placed, the femoral lug holes drilled and the trials were removed. The surfaces were rinsed with pulse lavage and dried. Two batches of cement  were mixed. Appropriate sized implants were cemented in place, a trial tibial liner placed, and the knee held in full extension until cement hardened. Fifty milliliters of Betadine was poured into the joint and suctioned out once the cement was hardened. The capsule was injected with a Ropivacaine orthopedic cocktail.   Excess cement was removed, and the actual tibial liner was

## 2017-10-17 NOTE — CARE COORDINATION
SW SENT DME ORDERS TO [facility] FOR DELIVERY TO THE ROOM     ROOM [address]    Jerel Vu #[id number] ([id number])  (JV 80 y.o. F)  (Adm: 10/17/17)   [facility] 5 [id number]   PCP   Sandra LLOYD   Demographics   Comment      Last edited by  on  at    Address: Home Phone:  Work Phone: Mobile Phone:   1 [facility]    [phone] -- [phone]   SSN: Insurance: Marital Status: Mormonism:   [id number] MEDICARE  None   Insurance Payors as of 10/17/2017   MEDICARE   Plan: MEDICARE PART A AND B Member: [id number] Effective from: 2015   Subscriber: Torrence Hodgkin Subscriber ID: [id number] Guarantor: Torrence Hodgkin   BC   Plan: [employer] MEDICARE SUPP Group: [id number] Member: [id number]   Effective from: 2015 Subscriber: Torrence Hodgkin Subscriber ID: [id number]   Guarantor: Torrence Hodgkin   Documents Filed to Patient   Power of  Living Will Clinical Unknown Study Attachment Consent Form ABN Waiver After Visit Summary Lab Result Scan Code Status MyChart Status Advance Care Planning   Not on File Filed on 10/17/17 Not on File Not on File Filed Not on File Not on File Not on File FULL [Updated on 10/17/17 1114] Pending Jump to the Activity    Auth/Cert Information      Open Auth/Cert for Hospital Account [de-identified]      Auth/Cert Information   Open Auth/Cert linked to [address] [facility] [de-identified]   Admission Information   Attending Provider Admitting Provider Admission Type Admission Date/Time   MD Berhane Carty MD Elective 10/17/17  9918   Discharge Date Hospital Service Auth/Cert Status Service Area    Med/Surg [facility])   Unit Room/Bed Admission Status      [facility] 5 SURG SERVICES [id number] Admission (Confirmed)    Hospital Account   Name Acct ID Class Status Primary Coverage   Jerel Vu [id number] Inpatient Open MEDICARE - MEDICARE PART A AND B          Guarantor Account (for [facility] Account [de-identified])   Name Relation to Pt Service Area Active? Acct Type   Nannette Gardner Self [address] Yes Personal/Family   Address Phone         [facility], [address] [place] [address] [phone])            Coverage Information (for Hospital Account [de-identified])   1. [facility] PART A AND B   F/O Payor/Plan Precert #   MEDICARE/MEDICARE PART A AND B    Subscriber Subscriber #   Nannette Gardner [id number]   Address Phone   [address] [address] [place] [address] [phone]   2.  BCBS/ANTHEM MEDICARE SUPP   F/O Payor/Plan Precert #   BCBS/ANTHEM MEDICARE SUPP    Subscriber Subscriber #   Nannette Gardner [id number]   Address Phone   [address]  [address] [phone]

## 2017-10-17 NOTE — CARE COORDINATION
ANGÉLICA met with Pt in the room to discuss DC plans. Pt stated she was not going to do outpatient therapy or home health. Pt stated she needed a walker to go home with and would like it from Katheryn Keita.   Electronically signed by Dg Carias on 10/17/2017 at 2:38 PM

## 2017-10-17 NOTE — H&P
[facility] ([facility]) Pre-Operative History and Physical    Patient Name: Prema Ontiveros  : 1934        Pre-Operative Diagnosis: left Primary knee osteoarthritis  History of Present Illness: This patient has had ongoing pain for several weeks/months that has been unresponsive to conservative care which has included injection, therapy, activity modification and presents now for surgery. Past Medical Hisotry:       Diagnosis Date    Breast cyst     Cancer [facility])     surgery only    Colon polyp     GERD (gastroesophageal reflux disease)     History of double vision     with surgical correction    Hypertension     Thyroid disease      Past Surgical History:       Procedure Laterality Date    ANKLE FRACTURE SURGERY Right     BACK SURGERY      plating    BREAST RECONSTRUCTION      césar. mastectomy with replacement    COLONOSCOPY  2007    Dr. Coty Castaneda (LOWER) N/A 2016    Dr Dayana Barker non-obstructing simple appearing pancreatic cyst of 2.2 x 1.9mm-no high risk features were seen    EYE SURGERY      recent    HYSTERECTOMY      TONSILLECTOMY AND ADENOIDECTOMY      TUBAL LIGATION      UPPER GASTROINTESTINAL ENDOSCOPY N/A 2016    Dr NNEKA Bhatia-gastritis/gastropathy       Medications:   Prior to Admission medications    Medication Sig Start Date End Date Taking?  Authorizing Provider   lisinopril (PRINIVIL;ZESTRIL) 40 MG tablet Take 40 mg by mouth daily Indications: High Blood Pressure Disorder   Yes Historical Provider, MD   levothyroxine (SYNTHROID) 50 MCG tablet Take 50 mcg by mouth Daily Indications: Underactive Thyroid   Yes Historical Provider, MD   metoprolol tartrate (LOPRESSOR) 25 MG tablet Take 25 mg by mouth 2 times daily Indications: High Blood Pressure Disorder    Yes Historical Provider, MD   omeprazole (PRILOSEC) 40 MG capsule Take 40 mg by mouth daily Indications: Gastroesophageal Reflux Disease  16  Yes Historical Provider, MD   Acetaminophen (TYLENOL 8 HOUR PO) Take 2 tablets by mouth 3 times daily as needed Indications: Pain    Yes Historical Provider, MD   Multiple Vitamin (MULTIVITAMIN PO) Take 1 tablet by mouth daily    Yes Historical Provider, MD   DiphenhydrAMINE HCl (BENADRYL ALLERGY PO) Take 1 tablet by mouth daily as needed Indications: Seasonal Allergy    Yes Historical Provider, MD   hydrochlorothiazide (HYDRODIURIL) 12.5 MG tablet Take 12.5 mg by mouth daily as needed Indications: Fluid Retention  6/6/16   Historical Provider, MD       Allergies:  Demerol; Nsaids; Stadol [butorphanol tartrate]; and Sulfa antibiotics    Social History:   Tobacco:  reports that she quit smoking about 32 years ago. She has a 17.00 pack-year smoking history. She has never used smokeless tobacco.   Alcohol:  reports that she drinks alcohol. Review of Systems:  General: Denies any fever or chills  EYES: Denies any diplopia  ENT: Tinnitus or vertigo  Resp: Denies any shortness of breath, cough or wheezing  Cardiac: Denies any chest pain, palpitations, claudication or edema  GI: Denies any melena, hematochezia, hematemesis or pyrosis  : Denies any frequency, urgency, hesitancy or incontinence  Musculoskeletal: Denies back pain, joint pain, myalgias  Heme: Denies bruising or bleeding easily  Endocrine: Denies any history of diabetes or thyroid disease  Psych: Denies anxiety or depression  Neuro: Denies any focal motor or sensory deficits      Physical Exam:  Vitals: BP (!) 179/80   Pulse 68   Temp 98 °F (36.7 °C) (Temporal)   Resp 18   Ht 5' 3.5\" (1.613 m)   Wt 135 lb (61.2 kg)   SpO2 99%   BMI 23.54 kg/m²   CONSTITUTIONAL: Alert, appropriate, no acute distress.   PSYCH: mood and affect are normal with a normal rate and tone of speech  EYES: Non icteric, EOM intact, pupils equal round and reactive to light  ENT: Mucus membranes moist, no oral pharyngeal lesions, nares patent   NECK: Supple, no masses, no JVD, trachea mid line   CHEST/LUNGS: CTA

## 2017-10-17 NOTE — PROGRESS NOTES
Physical Therapy    Facility/Department: [facility] SURG SERVICES  Initial Assessment    NAME: Henrietta Leal  : 1934  MRN: [id number]    Date of Service: 10/17/2017    Patient Diagnosis(es): There were no encounter diagnoses. has a past medical history of Breast cyst; Cancer ([address] Colon polyp; GERD (gastroesophageal reflux disease); History of double vision; Hypertension; and Thyroid disease. has a past surgical history that includes Tubal ligation; Hysterectomy; Tonsillectomy and adenoidectomy; back surgery; Ankle fracture surgery (Right); Upper gastrointestinal endoscopy (N/A, 2016); Endoscopic ultrasonography, GI (N/A, 2016); Colonoscopy (2007); eye surgery; Breast reconstruction; and total knee arthroplasty (Left, 10/17/2017).     Restrictions  Restrictions/Precautions  Restrictions/Precautions: Weight Bearing  Lower Extremity Weight Bearing Restrictions  Left Lower Extremity Weight Bearing: Weight Bearing As Tolerated  Vision/Hearing  Vision: Within Functional Limits  Hearing: Within functional limits     Subjective  General  Chart Reviewed: Yes  Family / Caregiver Present: No  Diagnosis: L TKR  Subjective  Subjective: Pt READY TO GET UP  Pain Screening  Patient Currently in Pain: Denies  Oxygen Therapy  O2 Device: Nasal cannula  O2 Flow Rate (L/min): 2 L/min       Orientation  Orientation  Overall Orientation Status: Within Normal Limits    Social/Functional History  Social/Functional History  Lives With: Alone  Type of Home: House  Home Layout: One level  Home Access: Stairs to enter without rails  Entrance Stairs - Number of Steps: 1  Bathroom Toilet: Standard  ADL Assistance: Independent  Homemaking Assistance: Independent  Homemaking Responsibilities: Yes  Ambulation Assistance: Independent  Active : Yes  Objective          AROM RLE (degrees)  RLE AROM: WFL  AROM LLE (degrees)  LLE General AROM: SEATED KNEE FLEX ~75  Strength RLE  R Hip Flexion: 5/5  R Knee Extension: 5/5  R

## 2017-10-18 VITALS
RESPIRATION RATE: 16 BRPM | DIASTOLIC BLOOD PRESSURE: 71 MMHG | BODY MASS INDEX: 23.05 KG/M2 | HEART RATE: 86 BPM | SYSTOLIC BLOOD PRESSURE: 139 MMHG | HEIGHT: 64 IN | WEIGHT: 135 LBS | OXYGEN SATURATION: 94 % | TEMPERATURE: 98.9 F

## 2017-10-18 LAB
ANION GAP SERPL CALCULATED.3IONS-SCNC: 12 MMOL/L (ref 7–19)
BUN BLDV-MCNC: 14 MG/DL (ref 8–23)
CALCIUM SERPL-MCNC: 8.3 MG/DL (ref 8.8–10.2)
CHLORIDE BLD-SCNC: 104 MMOL/L (ref 98–111)
CO2: 24 MMOL/L (ref 22–29)
CREAT SERPL-MCNC: 0.9 MG/DL (ref 0.5–0.9)
GFR NON-AFRICAN AMERICAN: 60
GLUCOSE BLD-MCNC: 132 MG/DL (ref 74–109)
HCT VFR BLD CALC: 28.3 % (ref 37–47)
HEMOGLOBIN: 8.7 G/DL (ref 12–16)
POTASSIUM SERPL-SCNC: 4.4 MMOL/L (ref 3.5–5)
SODIUM BLD-SCNC: 140 MMOL/L (ref 136–145)

## 2017-10-18 PROCEDURE — 6370000000 HC RX 637 (ALT 250 FOR IP): Performed by: ORTHOPAEDIC SURGERY

## 2017-10-18 PROCEDURE — 85018 HEMOGLOBIN: CPT

## 2017-10-18 PROCEDURE — 97116 GAIT TRAINING THERAPY: CPT

## 2017-10-18 PROCEDURE — 85014 HEMATOCRIT: CPT

## 2017-10-18 PROCEDURE — 97530 THERAPEUTIC ACTIVITIES: CPT

## 2017-10-18 PROCEDURE — 6370000000 HC RX 637 (ALT 250 FOR IP): Performed by: PHYSICIAN ASSISTANT

## 2017-10-18 PROCEDURE — 36415 COLL VENOUS BLD VENIPUNCTURE: CPT

## 2017-10-18 PROCEDURE — 2580000003 HC RX 258: Performed by: ORTHOPAEDIC SURGERY

## 2017-10-18 PROCEDURE — 80048 BASIC METABOLIC PNL TOTAL CA: CPT

## 2017-10-18 PROCEDURE — 6360000002 HC RX W HCPCS: Performed by: ORTHOPAEDIC SURGERY

## 2017-10-18 RX ORDER — DOCUSATE SODIUM 100 MG/1
100 CAPSULE, LIQUID FILLED ORAL DAILY
Qty: 20 CAPSULE | Refills: 0 | Status: SHIPPED | OUTPATIENT
Start: 2017-10-18

## 2017-10-18 RX ORDER — SUCRALFATE ORAL 1 G/10ML
1 SUSPENSION ORAL EVERY 6 HOURS SCHEDULED
Status: DISCONTINUED | OUTPATIENT
Start: 2017-10-18 | End: 2017-10-18 | Stop reason: HOSPADM

## 2017-10-18 RX ORDER — ASPIRIN 81 MG/1
81 TABLET ORAL 2 TIMES DAILY
Qty: 60 TABLET | Refills: 0 | Status: SHIPPED | OUTPATIENT
Start: 2017-10-18 | End: 2018-06-12

## 2017-10-18 RX ORDER — OXYCODONE HYDROCHLORIDE AND ACETAMINOPHEN 5; 325 MG/1; MG/1
TABLET ORAL
Qty: 40 TABLET | Refills: 0 | Status: SHIPPED | OUTPATIENT
Start: 2017-10-18 | End: 2018-02-15 | Stop reason: ALTCHOICE

## 2017-10-18 RX ADMIN — OXYCODONE HYDROCHLORIDE AND ACETAMINOPHEN 2 TABLET: 5; 325 TABLET ORAL at 10:26

## 2017-10-18 RX ADMIN — ASPIRIN 81 MG: 81 TABLET, COATED ORAL at 08:57

## 2017-10-18 RX ADMIN — DOCUSATE SODIUM 100 MG: 100 CAPSULE, LIQUID FILLED ORAL at 08:57

## 2017-10-18 RX ADMIN — CEFAZOLIN SODIUM 2 G: 2 SOLUTION INTRAVENOUS at 00:10

## 2017-10-18 RX ADMIN — LEVOTHYROXINE SODIUM 50 MCG: 25 TABLET ORAL at 05:34

## 2017-10-18 RX ADMIN — SUCRALFATE 1 G: 1 SUSPENSION ORAL at 08:57

## 2017-10-18 RX ADMIN — METOPROLOL TARTRATE 25 MG: 25 TABLET ORAL at 08:57

## 2017-10-18 RX ADMIN — SODIUM CHLORIDE: 9 INJECTION, SOLUTION INTRAVENOUS at 05:28

## 2017-10-18 RX ADMIN — HYDROCHLOROTHIAZIDE 12.5 MG: 25 TABLET ORAL at 08:57

## 2017-10-18 ASSESSMENT — PAIN SCALES - GENERAL: PAINLEVEL_OUTOF10: 10

## 2017-10-18 NOTE — CARE COORDINATION
Spoke with patient regarding MD orders for [facility] services. Patient agreeable and has chosen [facility]. Referral Faxed. [address] [facility] [phone]. [phone].  Electronically signed by Darron Knapp RN on 10/18/17 at 11:31 AM

## 2017-10-18 NOTE — DISCHARGE INSTR - DIET

## 2017-10-18 NOTE — CONSULTS
adjust meds as necessary. Constipation-start with Miralax 1 capful BID until BM, then decrease to 1 x a day,then can step up to Amitizia 24 mcg po BID which can be used for opiod induced constipation,and ultimately end up with Relistor 12 mcg sub Q q 48 hours to block the effect of narcotics on the gut. Order for Carafate given c/o pyrosis. Will follow closely  Cont post op care  Overall doing well PO#1 Left Knee arthroplasty  I discussed the patients findings and my recommendations with patient/family, staff and the Orthopaedic team.  Joselyn Paz  10/18/17  6:59 AM    Amazing 80-year-old lady on postop day 1 from a total knee that is anxious to go home. She is medically stable and has all home health care and physical therapy arranged. The patient was seen on rounds today. Reviewed the last 24 hours of labs and x-rays that are available. Updated overnight status with nursing staff. Reviewed the case with Mariya Chaidez PA-C. All questions were answered to the patient's/family's understanding. Patient is medically stable, please see orders for further details. I have discussed the care of Lindsay Bettencourt, including pertinent history and exam findings with the ARNP/PA. I have seen and examined the patient and the key elements of all parts of the encounter have been performed by me. I agree with the assessment and plan as outlined by the ARNP/PA. Please refer to my separate note for complete documentation.      Electronically signed by Huong Torres MD on 10/18/2017 at 10:24 AM

## 2018-02-15 ENCOUNTER — OFFICE VISIT (OUTPATIENT)
Dept: FAMILY MEDICINE CLINIC | Age: 83
End: 2018-02-15
Payer: MEDICARE

## 2018-02-15 VITALS
TEMPERATURE: 98.6 F | OXYGEN SATURATION: 98 % | BODY MASS INDEX: 23.19 KG/M2 | WEIGHT: 133 LBS | RESPIRATION RATE: 18 BRPM | DIASTOLIC BLOOD PRESSURE: 82 MMHG | SYSTOLIC BLOOD PRESSURE: 132 MMHG | HEART RATE: 78 BPM

## 2018-02-15 DIAGNOSIS — M79.671 BILATERAL FOOT PAIN: ICD-10-CM

## 2018-02-15 DIAGNOSIS — G89.29 CHRONIC MIDLINE LOW BACK PAIN WITHOUT SCIATICA: ICD-10-CM

## 2018-02-15 DIAGNOSIS — K21.9 GASTROESOPHAGEAL REFLUX DISEASE WITHOUT ESOPHAGITIS: ICD-10-CM

## 2018-02-15 DIAGNOSIS — Z13.220 LIPID SCREENING: ICD-10-CM

## 2018-02-15 DIAGNOSIS — R53.83 FATIGUE, UNSPECIFIED TYPE: ICD-10-CM

## 2018-02-15 DIAGNOSIS — I10 ESSENTIAL (PRIMARY) HYPERTENSION: ICD-10-CM

## 2018-02-15 DIAGNOSIS — M79.672 BILATERAL FOOT PAIN: ICD-10-CM

## 2018-02-15 DIAGNOSIS — Z00.00 ANNUAL PHYSICAL EXAM: Primary | ICD-10-CM

## 2018-02-15 DIAGNOSIS — M54.50 CHRONIC MIDLINE LOW BACK PAIN WITHOUT SCIATICA: ICD-10-CM

## 2018-02-15 PROCEDURE — 1036F TOBACCO NON-USER: CPT | Performed by: FAMILY MEDICINE

## 2018-02-15 PROCEDURE — 1123F ACP DISCUSS/DSCN MKR DOCD: CPT | Performed by: FAMILY MEDICINE

## 2018-02-15 PROCEDURE — 99214 OFFICE O/P EST MOD 30 MIN: CPT | Performed by: FAMILY MEDICINE

## 2018-02-15 PROCEDURE — 1090F PRES/ABSN URINE INCON ASSESS: CPT | Performed by: FAMILY MEDICINE

## 2018-02-15 PROCEDURE — G8484 FLU IMMUNIZE NO ADMIN: HCPCS | Performed by: FAMILY MEDICINE

## 2018-02-15 PROCEDURE — G8427 DOCREV CUR MEDS BY ELIG CLIN: HCPCS | Performed by: FAMILY MEDICINE

## 2018-02-15 PROCEDURE — 4040F PNEUMOC VAC/ADMIN/RCVD: CPT | Performed by: FAMILY MEDICINE

## 2018-02-15 PROCEDURE — G8400 PT W/DXA NO RESULTS DOC: HCPCS | Performed by: FAMILY MEDICINE

## 2018-02-15 PROCEDURE — G0438 PPPS, INITIAL VISIT: HCPCS | Performed by: FAMILY MEDICINE

## 2018-02-15 PROCEDURE — G8420 CALC BMI NORM PARAMETERS: HCPCS | Performed by: FAMILY MEDICINE

## 2018-02-15 ASSESSMENT — PATIENT HEALTH QUESTIONNAIRE - PHQ9: SUM OF ALL RESPONSES TO PHQ QUESTIONS 1-9: 0

## 2018-02-15 ASSESSMENT — LIFESTYLE VARIABLES: HOW OFTEN DO YOU HAVE A DRINK CONTAINING ALCOHOL: 0

## 2018-02-15 ASSESSMENT — ANXIETY QUESTIONNAIRES: GAD7 TOTAL SCORE: 0

## 2018-02-15 NOTE — PROGRESS NOTES
04/19/2007    Dr. Nancie Riddle (LOWER) N/A 5/25/2016    Dr Jamison Ramos non-obstructing simple appearing pancreatic cyst of 2.2 x 1.9mm-no high risk features were seen    EYE SURGERY      recent    HYSTERECTOMY      MI TOTAL KNEE ARTHROPLASTY Left 10/17/2017    KNEE TOTAL ARTHROPLASTY performed [facility] F[facility]z [facility] at [facility]      TUBAL LIGATION      UPPER GASTROINTESTINAL ENDOSCOPY N/A 5/25/2016    Dr NNEKA Bhatia-gastritis/gastropathy       Family History   Problem Relation Age of Onset    Colon Cancer Mother     Colon Polyps Mother     Esophageal Cancer Neg Hx     Liver Disease Neg Hx     Lung Cancer Neg Hx     Rectal Cancer Neg Hx     Stomach Cancer Neg Hx        Social History   Substance Use Topics    Smoking status: Former Smoker     Packs/day: 1.00     Years: 17.00     Quit date: 5/25/1985    Smokeless tobacco: Never Used    Alcohol use Yes      Comment: rare      Current Outpatient Prescriptions   Medication Sig Dispense Refill    docusate sodium (COLACE) 100 MG capsule Take 1 capsule by mouth daily To prevent constipation 20 capsule 0    aspirin EC 81 MG EC tablet Take 1 tablet by mouth 2 times daily 60 tablet 0    lisinopril (PRINIVIL;ZESTRIL) 40 MG tablet Take 40 mg by mouth daily Indications: High Blood Pressure Disorder      levothyroxine (SYNTHROID) 50 MCG tablet Take 50 mcg by mouth Daily Indications: Underactive Thyroid      metoprolol tartrate (LOPRESSOR) 25 MG tablet Take 25 mg by mouth 2 times daily Indications: High Blood Pressure Disorder       omeprazole (PRILOSEC) 40 MG capsule Take 40 mg by mouth daily Indications: Gastroesophageal Reflux Disease       hydrochlorothiazide (HYDRODIURIL) 12.5 MG tablet Take 12.5 mg by mouth daily as needed Indications: Fluid Retention       Acetaminophen (TYLENOL 8 HOUR PO) Take 2 tablets by mouth 3 times daily as needed Indications: Pain       Multiple Vitamin Abdominal: Soft. Bowel sounds are normal. She exhibits no distension. There is no tenderness. There is no rebound and no guarding. Musculoskeletal: She exhibits no edema. Negative straight leg raise. Normal gait. Normal plantar and dorsalflexion. Neurological: She is alert. She displays normal reflexes. She exhibits normal muscle tone. Coordination normal.   Psychiatric: She has a normal mood and affect. Her behavior is normal.         No results found for this or any previous visit (from the past 672 hour(s)). Assessment & Plan:      Here today for annual wellness and addressed the following issues: The following diagnoses and conditions are stable with no further orders unless indicated:  1. Bilateral foot pain  Offered x-rays the bilateral feet and referral for 2nd opinion to a different foot specialist. Provided information for specialist in [place] in [address] [place] [address]. She states she will call me back when she considers her options. 2. Chronic midline low back pain without sciatica  We'll obtain an MRI of the lumbar spine and further evaluate her history of degenerative disc disease and bulging disks. She has seen two pain specialists in the past. She's had surgery and seen orthopedist in the past.  She has tried opioid pain medication adverse effects and does not wish to remain on it. Reviewed these options with her and she will consider. Follow-up on results of the MRI   - MRI LUMBAR SPINE WO CONTRAST; Future    3. Lipid screeniRecommend we get a lipid panel   - Lipid Panel; Future    4. Fatigue, unspecified type  Likely multifactorial. We'll check following lab studies    - Comprehensive Metabolic Panel; Future  - CBC Auto Differential; Future    5. HTN:  Blood pressure is stable today. Continue current medication    6. Hypothyroidism:  Check TSH next visit.     7.  Reflux:   Symptoms are stable with omeprazole         Return in about 6 months (around 8/15/2018) for Routine follow up.           Discussed use, benefit, and side effects of prescribed medications. All patient questions answered. Pt voiced understanding. Reviewed health maintenance. Instructed to continue current medications, diet and exercise. Patient agreed with treatment plan. Follow up as directed.

## 2018-02-15 NOTE — PROGRESS NOTES
Medicare Annual Wellness Visit - Subsequent    Name: Arturo Ledbetter Date: 2/15/2018   MRN: [id number] Sex: Female   Age: 80 y.o. Ethnicity: Non-/Non    : 1934 Race: Sherryle Solomon is here for   Chief Complaint   Patient presents with    Annual Exam        Screenings for behavioral, psychosocial and functional/safety risks, and cognitive dysfunction are all negative except as indicated below. These results, as well as other patient data from the [address] form, are documented in Flowsheets linked to this Encounter. Allergies   Allergen Reactions    Demerol Other (See Comments)     Abdominal spasms with severe n/v/    Nsaids Other (See Comments)     Dark tarry stools    Stadol [Butorphanol Tartrate]     Sulfa Antibiotics        Prior to Visit Medications    Medication Sig Taking? Authorizing Provider   oxyCODONE-acetaminophen (PERCOCET) 5-325 MG per tablet One or two every 4 to 6 hours prn.  Yes Amanda Becerril MD   docusate sodium (COLACE) 100 MG capsule Take 1 capsule by mouth daily To prevent constipation Yes Amanda Becerril MD   aspirin EC 81 MG EC tablet Take 1 tablet by mouth 2 times daily Yes Amanda Becerril MD   lisinopril (PRINIVIL;ZESTRIL) 40 MG tablet Take 40 mg by mouth daily Indications: High Blood Pressure Disorder Yes Historical Provider, MD   levothyroxine (SYNTHROID) 50 MCG tablet Take 50 mcg by mouth Daily Indications: Underactive Thyroid Yes Historical Provider, MD   metoprolol tartrate (LOPRESSOR) 25 MG tablet Take 25 mg by mouth 2 times daily Indications: High Blood Pressure Disorder  Yes Historical Provider, MD   omeprazole (PRILOSEC) 40 MG capsule Take 40 mg by mouth daily Indications: Gastroesophageal Reflux Disease  Yes Historical Provider, MD   hydrochlorothiazide (HYDRODIURIL) 12.5 MG tablet Take 12.5 mg by mouth daily as needed Indications: Fluid Retention  Yes Historical Provider, MD   Acetaminophen (TYLENOL 8 HOUR PO) Take 2 tablets by mouth 3 times daily as needed Indications: Pain  Yes Historical Provider, MD   Multiple Vitamin (MULTIVITAMIN PO) Take 1 tablet by mouth daily  Yes Historical Provider, MD   DiphenhydrAMINE HCl (BENADRYL ALLERGY PO) Take 1 tablet by mouth daily as needed Indications: Seasonal Allergy  Yes Historical Provider, MD       Past Medical History:   Diagnosis Date    Breast cyst     Cancer ([address]     surgery only    Colon polyp     GERD (gastroesophageal reflux disease)     History of double vision     with surgical correction    Hypertension     Thyroid disease        Past Surgical History:   Procedure Laterality Date    ANKLE FRACTURE SURGERY Right     BACK SURGERY      plating    BREAST RECONSTRUCTION      césar. mastectomy with replacement    COLONOSCOPY  04/19/2007    Dr. Ana Godfrey (LOWER) N/A 5/25/2016    Dr Andie Orourke non-obstructing simple appearing pancreatic cyst of 2.2 x 1.9mm-no high risk features were seen    EYE SURGERY      recent    HYSTERECTOMY      SD TOTAL KNEE ARTHROPLASTY Left 10/17/2017    KNEE TOTAL ARTHROPLASTY performed by Michael Jefferson MD at [address]      UPPER GASTROINTESTINAL ENDOSCOPY N/A 5/25/2016    Dr NNEKA Bhatia-gastritis/gastropathy       Family History   Problem Relation Age of Onset    Colon Cancer Mother     Colon Polyps Mother     Esophageal Cancer Neg Hx     Liver Disease Neg Hx     Lung Cancer Neg Hx     Rectal Cancer Neg Hx     Stomach Cancer Neg Hx        CareTeam (Including outside providers/suppliers regularly involved in providing care):   Patient Care Team:  Gilford Fees, MD as PCP - General (Family Medicine)  ELTON Richmond (Family Nurse Practitioner)    Wt Readings from Last 3 Encounters:   02/15/18 133 lb (60.3 kg)   10/17/17 135 lb (61.2 kg)   09/29/17 140 lb (63.5 kg)     Vitals:    02/15/18 1421   BP: 132/82   Pulse:

## 2018-02-17 ASSESSMENT — ENCOUNTER SYMPTOMS
COUGH: 0
ANAL BLEEDING: 0
DIARRHEA: 0
SHORTNESS OF BREATH: 0
CONSTIPATION: 0
CHEST TIGHTNESS: 0
ABDOMINAL PAIN: 0
NAUSEA: 0

## 2018-02-23 ENCOUNTER — HOSPITAL ENCOUNTER (OUTPATIENT)
Dept: MRI IMAGING | Age: 83
Discharge: HOME | End: 2018-02-23
Payer: MEDICARE

## 2018-02-23 DIAGNOSIS — M54.50 CHRONIC MIDLINE LOW BACK PAIN WITHOUT SCIATICA: ICD-10-CM

## 2018-02-23 DIAGNOSIS — G89.29 CHRONIC MIDLINE LOW BACK PAIN WITHOUT SCIATICA: ICD-10-CM

## 2018-02-23 PROCEDURE — 72148 MRI LUMBAR SPINE W/O DYE: CPT

## 2018-03-07 ENCOUNTER — OFFICE VISIT (OUTPATIENT)
Dept: PODIATRY | Facility: CLINIC | Age: 83
End: 2018-03-07

## 2018-03-07 VITALS
WEIGHT: 133 LBS | DIASTOLIC BLOOD PRESSURE: 76 MMHG | BODY MASS INDEX: 23.57 KG/M2 | HEIGHT: 63 IN | OXYGEN SATURATION: 99 % | SYSTOLIC BLOOD PRESSURE: 130 MMHG | HEART RATE: 64 BPM

## 2018-03-07 DIAGNOSIS — M79.672 FOOT PAIN, BILATERAL: ICD-10-CM

## 2018-03-07 DIAGNOSIS — M79.89 SOFT TISSUE MASS: Primary | ICD-10-CM

## 2018-03-07 DIAGNOSIS — M79.671 FOOT PAIN, BILATERAL: ICD-10-CM

## 2018-03-07 DIAGNOSIS — M19.079 ARTHRITIS OF FOOT: ICD-10-CM

## 2018-03-07 DIAGNOSIS — M25.571 CHRONIC PAIN OF RIGHT ANKLE: ICD-10-CM

## 2018-03-07 DIAGNOSIS — G89.29 CHRONIC PAIN OF RIGHT ANKLE: ICD-10-CM

## 2018-03-07 PROCEDURE — 99203 OFFICE O/P NEW LOW 30 MIN: CPT | Performed by: PODIATRIST

## 2018-03-07 RX ORDER — LISINOPRIL 40 MG/1
40 TABLET ORAL DAILY
COMMUNITY

## 2018-03-07 RX ORDER — LEVOTHYROXINE SODIUM 0.05 MG/1
50 TABLET ORAL DAILY
COMMUNITY

## 2018-03-07 NOTE — PROGRESS NOTES
[facility] - PODIATRY    Today's Date: 03/07/18    Patient Name: Chayito Bee  MRN: [id number]  CSN: [id number]  PCP: Dmitriy Cox MD  Referring Provider: Referring, Self    SUBJECTIVE     Chief Complaint   Patient presents with   • Right Foot - Ankle Pain     Patient states ankle swelling on right foot. Bilateral feet popping, hurting. 8/10 on pain scale. She describes when walking the pain is just awful.    • Left Foot - Pain     HPI: Chayito Bee, a 83 y.o.female, comes to clinic as a(n) new patient complaining of foot pain and knot on right ankle. Patient has h/o breast cancer, right ankle injury, HTN. Patient states that for the past several years.  Her right ankle has been increasingly painful with walking.  Notes injury to the right ankle causing multiple ligament damage requiring surgery by Dr. Duenas.  Denies fracture.  Admits diffuse aching through both feet within joints when walking.  Denies acute injury or trauma.  Notes a mass or knot on the front of her right ankle for the past several months.  States the mass will periodically change size, up to the size of a golf ball.  Denies redness or drainage. Admits pain at 8/10 level and described as aching, nagging and throbbing. Denies previous treatment. Denies any constitutional symptoms. No other pedal complaints at this time.    Past Medical History:   Diagnosis Date   • Cancer 1975    Breast Cancer   • Difficulty walking    • Hypertension      Past Surgical History:   Procedure Laterality Date   • BONE MARROW ASPIRATION  2005   • EYE MUSCLE SURGERY  2018   • FOOT SURGERY      fell under car in snow tore all ligaments in her foot    • HYSTERECTOMY  1998   • MASTECTOMY Bilateral 1975   • TONSILLECTOMY  1951     Family History   Problem Relation Age of Onset   • Cancer Mother    • Heart disease Father      Social History     Social History   • Marital status:      Spouse name: N/A   • Number of children: N/A   •  Years of education: N/A     Occupational History   • Not on file.     Social History Main Topics   • Smoking status: Former Smoker   • Smokeless tobacco: Never Used   • Alcohol use 0.6 oz/week     1 Glasses of wine per week      Comment: occasionally with dinner   • Drug use: No   • Sexual activity: Defer     Other Topics Concern   • Not on file     Social History Narrative   • No narrative on file     Allergies   Allergen Reactions   • Demerol [Meperidine] GI Intolerance   • Sulfa Antibiotics Swelling     Current Outpatient Prescriptions   Medication Sig Dispense Refill   • Acetaminophen 325 MG capsule Take 1 tablet by mouth Every Night.     • DIPHENHYDRAMINE CITRATE PO Take 1 tablet by mouth Every Night.     • levothyroxine (SYNTHROID, LEVOTHROID) 50 MCG tablet Take 50 mcg by mouth Daily.     • lisinopril (PRINIVIL,ZESTRIL) 40 MG tablet Take 40 mg by mouth Daily.     • metoprolol tartrate (LOPRESSOR) 25 MG tablet Take 25 mg by mouth 2 (Two) Times a Day.     • Multiple Vitamins-Minerals (MULTIVITAMIN ADULT PO) Take 1 capsule by mouth Daily.     • Omeprazole (PRILOSEC PO) Take 1 tablet by mouth As Needed.       No current facility-administered medications for this visit.      Review of Systems   Constitutional: Negative for chills and fever.   HENT: Negative for congestion.    Respiratory: Negative for shortness of breath.    Cardiovascular: Positive for leg swelling. Negative for chest pain.   Gastrointestinal: Negative for constipation, diarrhea, nausea and vomiting.   Musculoskeletal:        Foot pain   Skin: Negative for wound.   Neurological: Negative for numbness.       OBJECTIVE     Vitals:    03/07/18 1505   BP: 130/76   Pulse: 64   SpO2: 99%       PHYSICAL EXAM  GEN:   A&Ox3, NAD. Pt presents to clinic ambulating without assistance and wearing Casual Shoes.      NEURO:   Protective sensation intact to 10/10 sites Right foot, 10/10 site Left foot using Wallace-Jese monofilament  Light touch sensation  present  No Tinel's or Villeux sign.    VASC:  Skin temperature Warm to Warm proximal to distal keli  DP pulses 2/4 Right, 2/4 Left  PT pulses 2/4 Right, 2/4 Left  CFT 3 sec keli  Pedal hair growth present  trace edema noted keli  Varicosities present keli    MUSC/SKEL:  Muscle Strength Right foot Dorsiflexors 5/5, Plantarflexors 5/5, Evertors 5/5, Invertors 5/5  Muscle Strength Left foot Dorsiflexors 5/5, Plantarflexors 5/5, Evertors 5/5, Invertors 5/5  Mild crepitus and pain noted to multiple midfoot joints of bilateral feet  ROM of the ankle joint is decreased with pain and crepitus on right  Soft, mildly tender mass noted to anterior lateral aspect of right ankle.   Rectus foot type   Gait pattern: Antalgic     DERM:  Pedal nails x10 are within normal limits of length and thickness  Webspaces are Clean, Dry, and Intact  Skin is normal in turgor and texture with no open wounds or sores appreciated.      RADIOLOGY/NUCLEAR:  No results found.    LABORATORY/CULTURE RESULTS:      PATHOLOGY RESULTS:       ASSESSMENT/PLAN     Chayito was seen today for ankle pain and pain.    Diagnoses and all orders for this visit:    Soft tissue mass    Chronic pain of right ankle    Foot pain, bilateral    Arthritis of foot      Comprehensive lower extremity examination and evaluation was performed.  Discussed findings and treatment plan including risks, benefits, and treatment options with patient in detail. Patient agreed with treatment plan.  I do not believe that the patient's pain is due to the massive her right ankle.  Mass appears to be possible ganglion/synovial cyst/bursa.  Does not appear malignant at this time.   Significant arthritis noted to right ankle, which appears to be main aspect of pain.  Patient is unable to take NSAIDs.  Has attempted use of topical therapy such as Voltaren gel with mild relief.  Given option of orthotics or [place] brace, patient defers both at this time.  An After Visit Summary was printed and  given to the patient at discharge, including (if requested) any available informative/educational handouts regarding diagnosis, treatment, or medications. All questions were answered to patient/family satisfaction. Should symptoms fail to improve or worsen they agree to call or return to clinic or to go to the Emergency Department. Discussed the importance of following up with any needed screening tests/labs/specialist appointments and any requested follow-up recommended by me today. Importance of maintaining follow-up discussed and patient accepts that missed appointments can delay diagnosis and potentially lead to worsening of conditions.  Return if symptoms worsen or fail to improve., or sooner if acute issues arise.        This document has been electronically signed by Cameron Blunt DPM on March 7, 2018 6:21 PM

## 2018-03-08 DIAGNOSIS — Z13.220 LIPID SCREENING: ICD-10-CM

## 2018-03-08 DIAGNOSIS — R53.83 FATIGUE, UNSPECIFIED TYPE: ICD-10-CM

## 2018-03-08 LAB
ALBUMIN SERPL-MCNC: 4.2 G/DL (ref 3.5–5.2)
ALP BLD-CCNC: 76 U/L (ref 35–104)
ALT SERPL-CCNC: 10 U/L (ref 5–33)
ANION GAP SERPL CALCULATED.3IONS-SCNC: 15 MMOL/L (ref 7–19)
AST SERPL-CCNC: 21 U/L (ref 5–32)
BASOPHILS ABSOLUTE: 0.1 K/UL (ref 0–0.2)
BASOPHILS RELATIVE PERCENT: 1.1 % (ref 0–1)
BILIRUB SERPL-MCNC: 0.4 MG/DL (ref 0.2–1.2)
BUN BLDV-MCNC: 20 MG/DL (ref 8–23)
CALCIUM SERPL-MCNC: 9.8 MG/DL (ref 8.8–10.2)
CHLORIDE BLD-SCNC: 103 MMOL/L (ref 98–111)
CHOLESTEROL, TOTAL: 225 MG/DL (ref 160–199)
CO2: 25 MMOL/L (ref 22–29)
CREAT SERPL-MCNC: 0.9 MG/DL (ref 0.5–0.9)
EOSINOPHILS ABSOLUTE: 0.3 K/UL (ref 0–0.6)
EOSINOPHILS RELATIVE PERCENT: 5.2 % (ref 0–5)
GFR NON-AFRICAN AMERICAN: 60
GLUCOSE BLD-MCNC: 93 MG/DL (ref 74–109)
HCT VFR BLD CALC: 37 % (ref 37–47)
HDLC SERPL-MCNC: 57 MG/DL (ref 65–121)
HEMOGLOBIN: 11.2 G/DL (ref 12–16)
LDL CHOLESTEROL CALCULATED: 143 MG/DL
LYMPHOCYTES ABSOLUTE: 2.1 K/UL (ref 1.1–4.5)
LYMPHOCYTES RELATIVE PERCENT: 31.7 % (ref 20–40)
MCH RBC QN AUTO: 25.9 PG (ref 27–31)
MCHC RBC AUTO-ENTMCNC: 30.3 G/DL (ref 33–37)
MCV RBC AUTO: 85.6 FL (ref 81–99)
MONOCYTES ABSOLUTE: 0.7 K/UL (ref 0–0.9)
MONOCYTES RELATIVE PERCENT: 10.6 % (ref 0–10)
NEUTROPHILS ABSOLUTE: 3.3 K/UL (ref 1.5–7.5)
NEUTROPHILS RELATIVE PERCENT: 51.1 % (ref 50–65)
PDW BLD-RTO: 18.9 % (ref 11.5–14.5)
PLATELET # BLD: 341 K/UL (ref 130–400)
PMV BLD AUTO: 10 FL (ref 9.4–12.3)
POTASSIUM SERPL-SCNC: 4.3 MMOL/L (ref 3.5–5)
RBC # BLD: 4.32 M/UL (ref 4.2–5.4)
SODIUM BLD-SCNC: 143 MMOL/L (ref 136–145)
TOTAL PROTEIN: 7 G/DL (ref 6.6–8.7)
TRIGL SERPL-MCNC: 125 MG/DL (ref 0–149)
WBC # BLD: 6.5 K/UL (ref 4.8–10.8)

## 2018-03-09 ENCOUNTER — OFFICE VISIT (OUTPATIENT)
Dept: FAMILY MEDICINE CLINIC | Age: 83
End: 2018-03-09
Payer: MEDICARE

## 2018-03-09 VITALS
HEART RATE: 75 BPM | TEMPERATURE: 97.5 F | RESPIRATION RATE: 16 BRPM | BODY MASS INDEX: 23.19 KG/M2 | SYSTOLIC BLOOD PRESSURE: 122 MMHG | DIASTOLIC BLOOD PRESSURE: 78 MMHG | OXYGEN SATURATION: 97 % | WEIGHT: 133 LBS

## 2018-03-09 DIAGNOSIS — K21.9 GASTROESOPHAGEAL REFLUX DISEASE WITHOUT ESOPHAGITIS: ICD-10-CM

## 2018-03-09 DIAGNOSIS — I10 ESSENTIAL (PRIMARY) HYPERTENSION: ICD-10-CM

## 2018-03-09 DIAGNOSIS — M54.50 CHRONIC MIDLINE LOW BACK PAIN WITHOUT SCIATICA: Primary | ICD-10-CM

## 2018-03-09 DIAGNOSIS — G89.29 CHRONIC MIDLINE LOW BACK PAIN WITHOUT SCIATICA: Primary | ICD-10-CM

## 2018-03-09 PROCEDURE — G8420 CALC BMI NORM PARAMETERS: HCPCS | Performed by: FAMILY MEDICINE

## 2018-03-09 PROCEDURE — 99213 OFFICE O/P EST LOW 20 MIN: CPT | Performed by: FAMILY MEDICINE

## 2018-03-09 PROCEDURE — 1123F ACP DISCUSS/DSCN MKR DOCD: CPT | Performed by: FAMILY MEDICINE

## 2018-03-09 PROCEDURE — G8427 DOCREV CUR MEDS BY ELIG CLIN: HCPCS | Performed by: FAMILY MEDICINE

## 2018-03-09 PROCEDURE — 1036F TOBACCO NON-USER: CPT | Performed by: FAMILY MEDICINE

## 2018-03-09 PROCEDURE — G8400 PT W/DXA NO RESULTS DOC: HCPCS | Performed by: FAMILY MEDICINE

## 2018-03-09 PROCEDURE — G8484 FLU IMMUNIZE NO ADMIN: HCPCS | Performed by: FAMILY MEDICINE

## 2018-03-09 PROCEDURE — 4040F PNEUMOC VAC/ADMIN/RCVD: CPT | Performed by: FAMILY MEDICINE

## 2018-03-09 PROCEDURE — 1090F PRES/ABSN URINE INCON ASSESS: CPT | Performed by: FAMILY MEDICINE

## 2018-03-09 NOTE — PROGRESS NOTES
[facility] [address] [place], [place] [address] [facility]  [address] [place] [address]  Dept: [phone]  Dept Fax: [phone]  Loc: [phone]    Hussein Fishman is a 80 y.o. female who presents today for her medical conditions/complaints as noted below. Hussein Fishman is here for Discuss Labs        HPI:   CC: Here today to discuss the following:    Lower Back Pain, Chronic  Continues to be an issue. She is here today to review her MRI results. She continues to have moderate to severe lower back pain. Pain often radiates down both legs. Pain worse with bending twisting and stooping. She has no numbness or weakness of her lower extremity. No recent injuries or falls. She's been unable to tolerate opioid medication. She has been to pain management past but did not find much relief either.  She has had spine surgery without resolution of her current pain              HPI    Past Medical History:   Diagnosis Date    Breast cyst     [address] [address]     surgery only    Colon polyp     GERD (gastroesophageal reflux disease)     History of double vision     with surgical correction    Hypertension     Thyroid disease       Past Surgical History:   Procedure Laterality Date    ANKLE FRACTURE SURGERY Right     BACK SURGERY      plating    BREAST RECONSTRUCTION      césar. mastectomy with replacement    COLONOSCOPY  04/19/2007    Dr. Harini Prieto (LOWER) N/A 5/25/2016    Dr Leann Vitale non-obstructing simple appearing pancreatic cyst of 2.2 x 1.9mm-no high risk features were seen    EYE SURGERY      recent    HYSTERECTOMY      AL TOTAL KNEE ARTHROPLASTY Left 10/17/2017    KNEE TOTAL ARTHROPLASTY performed by Lele Cabezas MD at [address]      TUBAL LIGATION      UPPER GASTROINTESTINAL ENDOSCOPY N/A 5/25/2016    Dr NNEKA Bhatia-gastritis/gastropathy       Family History   Problem Relation Age of Onset    Colon

## 2018-03-11 ASSESSMENT — ENCOUNTER SYMPTOMS
NAUSEA: 0
DIARRHEA: 0
ANAL BLEEDING: 0
ABDOMINAL PAIN: 0
CHEST TIGHTNESS: 0
CONSTIPATION: 0
SHORTNESS OF BREATH: 0
COUGH: 0
BACK PAIN: 1

## 2018-03-15 RX ORDER — LISINOPRIL 20 MG/1
TABLET ORAL
Qty: 180 TABLET | Refills: 3 | Status: SHIPPED | OUTPATIENT
Start: 2018-03-15 | End: 2018-06-12 | Stop reason: ALTCHOICE

## 2018-04-20 ENCOUNTER — TELEPHONE (OUTPATIENT)
Dept: FAMILY MEDICINE CLINIC | Age: 83
End: 2018-04-20

## 2018-06-12 ENCOUNTER — OFFICE VISIT (OUTPATIENT)
Dept: FAMILY MEDICINE CLINIC | Age: 83
End: 2018-06-12
Payer: MEDICARE

## 2018-06-12 VITALS
TEMPERATURE: 98.3 F | DIASTOLIC BLOOD PRESSURE: 78 MMHG | HEART RATE: 62 BPM | BODY MASS INDEX: 23.8 KG/M2 | RESPIRATION RATE: 18 BRPM | SYSTOLIC BLOOD PRESSURE: 118 MMHG | OXYGEN SATURATION: 96 % | WEIGHT: 136.5 LBS

## 2018-06-12 DIAGNOSIS — I10 ESSENTIAL (PRIMARY) HYPERTENSION: ICD-10-CM

## 2018-06-12 DIAGNOSIS — M79.671 BILATERAL FOOT PAIN: ICD-10-CM

## 2018-06-12 DIAGNOSIS — F33.42 RECURRENT MAJOR DEPRESSIVE DISORDER, IN FULL REMISSION (HCC): ICD-10-CM

## 2018-06-12 DIAGNOSIS — M54.50 CHRONIC MIDLINE LOW BACK PAIN WITHOUT SCIATICA: Primary | ICD-10-CM

## 2018-06-12 DIAGNOSIS — K59.09 CHRONIC CONSTIPATION: ICD-10-CM

## 2018-06-12 DIAGNOSIS — G89.29 CHRONIC MIDLINE LOW BACK PAIN WITHOUT SCIATICA: Primary | ICD-10-CM

## 2018-06-12 DIAGNOSIS — K21.9 GASTROESOPHAGEAL REFLUX DISEASE WITHOUT ESOPHAGITIS: ICD-10-CM

## 2018-06-12 DIAGNOSIS — E03.9 PRIMARY HYPOTHYROIDISM: ICD-10-CM

## 2018-06-12 DIAGNOSIS — M79.672 BILATERAL FOOT PAIN: ICD-10-CM

## 2018-06-12 PROCEDURE — G8427 DOCREV CUR MEDS BY ELIG CLIN: HCPCS | Performed by: FAMILY MEDICINE

## 2018-06-12 PROCEDURE — 1090F PRES/ABSN URINE INCON ASSESS: CPT | Performed by: FAMILY MEDICINE

## 2018-06-12 PROCEDURE — G8400 PT W/DXA NO RESULTS DOC: HCPCS | Performed by: FAMILY MEDICINE

## 2018-06-12 PROCEDURE — 1123F ACP DISCUSS/DSCN MKR DOCD: CPT | Performed by: FAMILY MEDICINE

## 2018-06-12 PROCEDURE — G8420 CALC BMI NORM PARAMETERS: HCPCS | Performed by: FAMILY MEDICINE

## 2018-06-12 PROCEDURE — 1036F TOBACCO NON-USER: CPT | Performed by: FAMILY MEDICINE

## 2018-06-12 PROCEDURE — 4040F PNEUMOC VAC/ADMIN/RCVD: CPT | Performed by: FAMILY MEDICINE

## 2018-06-12 PROCEDURE — 99214 OFFICE O/P EST MOD 30 MIN: CPT | Performed by: FAMILY MEDICINE

## 2018-06-13 ASSESSMENT — ENCOUNTER SYMPTOMS
DIARRHEA: 0
ANAL BLEEDING: 0
SHORTNESS OF BREATH: 0
ABDOMINAL PAIN: 0
BACK PAIN: 1
CONSTIPATION: 0
COUGH: 0
NAUSEA: 0
CHEST TIGHTNESS: 0

## 2018-06-20 ENCOUNTER — TELEPHONE (OUTPATIENT)
Dept: PRIMARY CARE CLINIC | Age: 83
End: 2018-06-20

## 2018-06-20 LAB
CONTROL: POSITIVE
HEMOCCULT STL QL: NORMAL

## 2018-09-04 ENCOUNTER — OFFICE VISIT (OUTPATIENT)
Dept: FAMILY MEDICINE CLINIC | Age: 83
End: 2018-09-04
Payer: MEDICARE

## 2018-09-04 VITALS
SYSTOLIC BLOOD PRESSURE: 132 MMHG | HEART RATE: 63 BPM | DIASTOLIC BLOOD PRESSURE: 78 MMHG | TEMPERATURE: 98.1 F | WEIGHT: 137.2 LBS | BODY MASS INDEX: 23.92 KG/M2 | OXYGEN SATURATION: 100 %

## 2018-09-04 DIAGNOSIS — M54.50 CHRONIC MIDLINE LOW BACK PAIN WITHOUT SCIATICA: Primary | ICD-10-CM

## 2018-09-04 DIAGNOSIS — G89.29 CHRONIC MIDLINE LOW BACK PAIN WITHOUT SCIATICA: Primary | ICD-10-CM

## 2018-09-04 PROCEDURE — 99213 OFFICE O/P EST LOW 20 MIN: CPT | Performed by: CLINICAL NURSE SPECIALIST

## 2018-09-04 PROCEDURE — G8427 DOCREV CUR MEDS BY ELIG CLIN: HCPCS | Performed by: CLINICAL NURSE SPECIALIST

## 2018-09-04 PROCEDURE — 1123F ACP DISCUSS/DSCN MKR DOCD: CPT | Performed by: CLINICAL NURSE SPECIALIST

## 2018-09-04 PROCEDURE — 1101F PT FALLS ASSESS-DOCD LE1/YR: CPT | Performed by: CLINICAL NURSE SPECIALIST

## 2018-09-04 PROCEDURE — 1036F TOBACCO NON-USER: CPT | Performed by: CLINICAL NURSE SPECIALIST

## 2018-09-04 PROCEDURE — G8420 CALC BMI NORM PARAMETERS: HCPCS | Performed by: CLINICAL NURSE SPECIALIST

## 2018-09-04 PROCEDURE — 1090F PRES/ABSN URINE INCON ASSESS: CPT | Performed by: CLINICAL NURSE SPECIALIST

## 2018-09-04 PROCEDURE — 4040F PNEUMOC VAC/ADMIN/RCVD: CPT | Performed by: CLINICAL NURSE SPECIALIST

## 2018-09-04 PROCEDURE — G8400 PT W/DXA NO RESULTS DOC: HCPCS | Performed by: CLINICAL NURSE SPECIALIST

## 2018-09-04 ASSESSMENT — ENCOUNTER SYMPTOMS
SHORTNESS OF BREATH: 0
WHEEZING: 0
COLOR CHANGE: 0
CONSTIPATION: 0
CHEST TIGHTNESS: 0
TROUBLE SWALLOWING: 0
SORE THROAT: 0
COUGH: 0
SINUS PRESSURE: 0
BACK PAIN: 1
DIARRHEA: 0
ABDOMINAL PAIN: 0

## 2019-01-10 ENCOUNTER — OFFICE VISIT (OUTPATIENT)
Dept: FAMILY MEDICINE CLINIC | Age: 84
End: 2019-01-10
Payer: MEDICARE

## 2019-01-10 VITALS
HEIGHT: 64 IN | OXYGEN SATURATION: 99 % | HEART RATE: 74 BPM | BODY MASS INDEX: 24.07 KG/M2 | TEMPERATURE: 97.4 F | DIASTOLIC BLOOD PRESSURE: 82 MMHG | WEIGHT: 141 LBS | SYSTOLIC BLOOD PRESSURE: 144 MMHG

## 2019-01-10 DIAGNOSIS — M54.50 CHRONIC MIDLINE LOW BACK PAIN WITHOUT SCIATICA: Primary | ICD-10-CM

## 2019-01-10 DIAGNOSIS — M79.671 BILATERAL FOOT PAIN: ICD-10-CM

## 2019-01-10 DIAGNOSIS — F33.42 RECURRENT MAJOR DEPRESSIVE DISORDER, IN FULL REMISSION (HCC): ICD-10-CM

## 2019-01-10 DIAGNOSIS — M79.672 BILATERAL FOOT PAIN: ICD-10-CM

## 2019-01-10 DIAGNOSIS — G89.29 CHRONIC MIDLINE LOW BACK PAIN WITHOUT SCIATICA: Primary | ICD-10-CM

## 2019-01-10 PROCEDURE — G8427 DOCREV CUR MEDS BY ELIG CLIN: HCPCS | Performed by: FAMILY MEDICINE

## 2019-01-10 PROCEDURE — 99214 OFFICE O/P EST MOD 30 MIN: CPT | Performed by: FAMILY MEDICINE

## 2019-01-10 PROCEDURE — 1090F PRES/ABSN URINE INCON ASSESS: CPT | Performed by: FAMILY MEDICINE

## 2019-01-10 PROCEDURE — G8400 PT W/DXA NO RESULTS DOC: HCPCS | Performed by: FAMILY MEDICINE

## 2019-01-10 PROCEDURE — 1123F ACP DISCUSS/DSCN MKR DOCD: CPT | Performed by: FAMILY MEDICINE

## 2019-01-10 PROCEDURE — 4040F PNEUMOC VAC/ADMIN/RCVD: CPT | Performed by: FAMILY MEDICINE

## 2019-01-10 PROCEDURE — G8420 CALC BMI NORM PARAMETERS: HCPCS | Performed by: FAMILY MEDICINE

## 2019-01-10 PROCEDURE — 1036F TOBACCO NON-USER: CPT | Performed by: FAMILY MEDICINE

## 2019-01-10 PROCEDURE — 1101F PT FALLS ASSESS-DOCD LE1/YR: CPT | Performed by: FAMILY MEDICINE

## 2019-01-10 PROCEDURE — G8484 FLU IMMUNIZE NO ADMIN: HCPCS | Performed by: FAMILY MEDICINE

## 2019-01-15 ASSESSMENT — ENCOUNTER SYMPTOMS
BACK PAIN: 1
SHORTNESS OF BREATH: 0
CHEST TIGHTNESS: 0
ANAL BLEEDING: 0
ABDOMINAL PAIN: 0
NAUSEA: 0
CONSTIPATION: 0
COUGH: 0
DIARRHEA: 0

## 2019-02-06 ENCOUNTER — HOSPITAL ENCOUNTER (OUTPATIENT)
Dept: GENERAL RADIOLOGY | Age: 84
Discharge: HOME | End: 2019-02-06
Payer: MEDICARE

## 2019-02-06 ENCOUNTER — HOSPITAL ENCOUNTER (OUTPATIENT)
Dept: PAIN MANAGEMENT | Age: 84
Discharge: HOME | End: 2019-02-06
Payer: MEDICARE

## 2019-02-06 VITALS
SYSTOLIC BLOOD PRESSURE: 162 MMHG | DIASTOLIC BLOOD PRESSURE: 87 MMHG | HEART RATE: 67 BPM | RESPIRATION RATE: 16 BRPM | TEMPERATURE: 97.9 F | OXYGEN SATURATION: 94 %

## 2019-02-06 DIAGNOSIS — Z98.890 HX OF SPINAL SURGERY: ICD-10-CM

## 2019-02-06 DIAGNOSIS — M25.552 CHRONIC HIP PAIN, BILATERAL: Primary | ICD-10-CM

## 2019-02-06 DIAGNOSIS — M47.817 LUMBOSACRAL SPONDYLOSIS WITHOUT MYELOPATHY: ICD-10-CM

## 2019-02-06 DIAGNOSIS — M51.36 DDD (DEGENERATIVE DISC DISEASE), LUMBAR: ICD-10-CM

## 2019-02-06 DIAGNOSIS — G89.29 CHRONIC HIP PAIN, BILATERAL: ICD-10-CM

## 2019-02-06 DIAGNOSIS — M25.551 CHRONIC HIP PAIN, BILATERAL: ICD-10-CM

## 2019-02-06 DIAGNOSIS — G89.29 CHRONIC HIP PAIN, BILATERAL: Primary | ICD-10-CM

## 2019-02-06 DIAGNOSIS — M25.551 CHRONIC HIP PAIN, BILATERAL: Primary | ICD-10-CM

## 2019-02-06 DIAGNOSIS — M25.552 CHRONIC HIP PAIN, BILATERAL: ICD-10-CM

## 2019-02-06 PROBLEM — M51.369 DDD (DEGENERATIVE DISC DISEASE), LUMBAR: Status: ACTIVE | Noted: 2019-02-06

## 2019-02-06 PROCEDURE — 99214 OFFICE O/P EST MOD 30 MIN: CPT | Performed by: NURSE PRACTITIONER

## 2019-02-06 PROCEDURE — 72120 X-RAY BEND ONLY L-S SPINE: CPT

## 2019-02-06 PROCEDURE — 99204 OFFICE O/P NEW MOD 45 MIN: CPT

## 2019-02-06 PROCEDURE — 73521 X-RAY EXAM HIPS BI 2 VIEWS: CPT

## 2019-02-06 RX ORDER — RANITIDINE HCL 75 MG
75 TABLET ORAL 2 TIMES DAILY
COMMUNITY
End: 2019-12-10 | Stop reason: ALTCHOICE

## 2019-02-06 ASSESSMENT — ENCOUNTER SYMPTOMS
EYE REDNESS: 0
BACK PAIN: 1
SORE THROAT: 0
CONSTIPATION: 0
BOWEL INCONTINENCE: 0
SHORTNESS OF BREATH: 0
EYE PAIN: 0
NAUSEA: 0
WHEEZING: 0

## 2019-02-06 ASSESSMENT — PAIN - FUNCTIONAL ASSESSMENT: PAIN_FUNCTIONAL_ASSESSMENT: PREVENTS OR INTERFERES SOME ACTIVE ACTIVITIES AND ADLS

## 2019-02-06 ASSESSMENT — PAIN DESCRIPTION - PAIN TYPE: TYPE: CHRONIC PAIN

## 2019-02-06 ASSESSMENT — PAIN SCALES - GENERAL: PAINLEVEL_OUTOF10: 6

## 2019-02-06 ASSESSMENT — PAIN DESCRIPTION - ONSET: ONSET: ON-GOING

## 2019-02-06 ASSESSMENT — PAIN DESCRIPTION - PROGRESSION: CLINICAL_PROGRESSION: GRADUALLY WORSENING

## 2019-02-06 ASSESSMENT — PAIN DESCRIPTION - FREQUENCY: FREQUENCY: CONTINUOUS

## 2019-02-06 ASSESSMENT — PAIN DESCRIPTION - LOCATION: LOCATION: BACK

## 2019-02-06 ASSESSMENT — PAIN DESCRIPTION - DESCRIPTORS: DESCRIPTORS: RADIATING;SORE

## 2019-02-15 ENCOUNTER — HOSPITAL ENCOUNTER (OUTPATIENT)
Dept: PAIN MANAGEMENT | Age: 84
Discharge: HOME | End: 2019-02-15
Payer: MEDICARE

## 2019-02-15 VITALS
DIASTOLIC BLOOD PRESSURE: 74 MMHG | SYSTOLIC BLOOD PRESSURE: 173 MMHG | RESPIRATION RATE: 18 BRPM | OXYGEN SATURATION: 99 % | TEMPERATURE: 97.8 F | HEART RATE: 78 BPM

## 2019-02-15 DIAGNOSIS — M47.816 LUMBAR FACET JOINT SYNDROME: ICD-10-CM

## 2019-02-15 PROCEDURE — 6360000002 HC RX W HCPCS

## 2019-02-15 PROCEDURE — 64494 INJ PARAVERT F JNT L/S 2 LEV: CPT

## 2019-02-15 PROCEDURE — 2500000003 HC RX 250 WO HCPCS

## 2019-02-15 PROCEDURE — 64493 INJ PARAVERT F JNT L/S 1 LEV: CPT

## 2019-02-15 PROCEDURE — 3209999900 FLUORO FOR SURGICAL PROCEDURES

## 2019-02-15 RX ORDER — METHYLPREDNISOLONE ACETATE 80 MG/ML
INJECTION, SUSPENSION INTRA-ARTICULAR; INTRALESIONAL; INTRAMUSCULAR; SOFT TISSUE
Status: COMPLETED | OUTPATIENT
Start: 2019-02-15 | End: 2019-02-15

## 2019-02-15 RX ORDER — LIDOCAINE HYDROCHLORIDE 10 MG/ML
INJECTION, SOLUTION EPIDURAL; INFILTRATION; INTRACAUDAL; PERINEURAL
Status: COMPLETED | OUTPATIENT
Start: 2019-02-15 | End: 2019-02-15

## 2019-02-15 RX ORDER — BUPIVACAINE HYDROCHLORIDE 5 MG/ML
INJECTION, SOLUTION EPIDURAL; INTRACAUDAL
Status: COMPLETED | OUTPATIENT
Start: 2019-02-15 | End: 2019-02-15

## 2019-02-15 RX ADMIN — BUPIVACAINE HYDROCHLORIDE 5 ML: 5 INJECTION, SOLUTION EPIDURAL; INTRACAUDAL at 10:48

## 2019-02-15 RX ADMIN — METHYLPREDNISOLONE ACETATE 80 MG: 80 INJECTION, SUSPENSION INTRA-ARTICULAR; INTRALESIONAL; INTRAMUSCULAR; SOFT TISSUE at 10:49

## 2019-02-15 RX ADMIN — LIDOCAINE HYDROCHLORIDE 20 ML: 10 INJECTION, SOLUTION EPIDURAL; INFILTRATION; INTRACAUDAL; PERINEURAL at 10:47

## 2019-02-15 ASSESSMENT — PAIN SCALES - GENERAL: PAINLEVEL_OUTOF10: 7

## 2019-02-15 ASSESSMENT — PAIN - FUNCTIONAL ASSESSMENT: PAIN_FUNCTIONAL_ASSESSMENT: 0-10

## 2019-02-18 ENCOUNTER — TELEPHONE (OUTPATIENT)
Dept: PAIN MANAGEMENT | Age: 84
End: 2019-02-18

## 2019-02-18 NOTE — TELEPHONE ENCOUNTER
I called Ron Ad as a follow up from her bilateral lumbar facet injections at the level of L3-4 and L4-5 that she had on 2/15/19. Ron Ad reported that she was starting to feel better starting today. Her pain was at a level of a 7-8 prior to her injections. She reports that it is a 3-4 now. She feels that Dr. Kaye Ribera did get to the source of her pain. She also stated that she has received at least 80% pain relief.

## 2019-03-05 RX ORDER — LISINOPRIL 20 MG/1
TABLET ORAL
Qty: 180 TABLET | Refills: 3 | Status: SHIPPED | OUTPATIENT
Start: 2019-03-05 | End: 2020-03-23

## 2019-04-16 ENCOUNTER — OFFICE VISIT (OUTPATIENT)
Dept: FAMILY MEDICINE CLINIC | Age: 84
End: 2019-04-16
Payer: MEDICARE

## 2019-04-16 VITALS
WEIGHT: 144 LBS | HEART RATE: 70 BPM | BODY MASS INDEX: 25.11 KG/M2 | SYSTOLIC BLOOD PRESSURE: 138 MMHG | OXYGEN SATURATION: 98 % | TEMPERATURE: 98.2 F | DIASTOLIC BLOOD PRESSURE: 78 MMHG

## 2019-04-16 DIAGNOSIS — M79.672 BILATERAL FOOT PAIN: ICD-10-CM

## 2019-04-16 DIAGNOSIS — G89.29 CHRONIC MIDLINE LOW BACK PAIN WITHOUT SCIATICA: Primary | ICD-10-CM

## 2019-04-16 DIAGNOSIS — M54.50 CHRONIC MIDLINE LOW BACK PAIN WITHOUT SCIATICA: Primary | ICD-10-CM

## 2019-04-16 DIAGNOSIS — M19.049 OSTEOARTHRITIS OF HAND, UNSPECIFIED LATERALITY, UNSPECIFIED OSTEOARTHRITIS TYPE: ICD-10-CM

## 2019-04-16 DIAGNOSIS — M79.671 BILATERAL FOOT PAIN: ICD-10-CM

## 2019-04-16 PROCEDURE — 1036F TOBACCO NON-USER: CPT | Performed by: FAMILY MEDICINE

## 2019-04-16 PROCEDURE — G8427 DOCREV CUR MEDS BY ELIG CLIN: HCPCS | Performed by: FAMILY MEDICINE

## 2019-04-16 PROCEDURE — 1123F ACP DISCUSS/DSCN MKR DOCD: CPT | Performed by: FAMILY MEDICINE

## 2019-04-16 PROCEDURE — G8419 CALC BMI OUT NRM PARAM NOF/U: HCPCS | Performed by: FAMILY MEDICINE

## 2019-04-16 PROCEDURE — G8400 PT W/DXA NO RESULTS DOC: HCPCS | Performed by: FAMILY MEDICINE

## 2019-04-16 PROCEDURE — 4040F PNEUMOC VAC/ADMIN/RCVD: CPT | Performed by: FAMILY MEDICINE

## 2019-04-16 PROCEDURE — 99213 OFFICE O/P EST LOW 20 MIN: CPT | Performed by: FAMILY MEDICINE

## 2019-04-16 PROCEDURE — 1090F PRES/ABSN URINE INCON ASSESS: CPT | Performed by: FAMILY MEDICINE

## 2019-04-16 NOTE — PROGRESS NOTES
Burak Good [address] [address]  Dept: [phone]  Dept Fax: [phone]  Loc: [phone]    Lydia Kingston is a 80 y.o. female who presents today for her medical conditions/complaints as noted below. Lydia Kingston is here for 3 Month Follow-Up        HPI:   CC: Here today to discuss the following:    She apparently had an adverse reaction to her facet block with depo methylprednisolone. She continue to have lower back, hip, and foot pain. She is continues to look for options for management of her chronic neck lower  and knee pain. she has tried multiple modalities for pain control without success. She's tried Tylenol, NSAIDs, opioids, tramadol, pain management injections, physical therapy, massage therapy without any relief. Hypertension  Compliant with medications. No adverse effects from medication. No lightheadedness, palpitations, or chest pain. Hypothyroidism  Symptoms are currently well controlled. No temperature intolerance, mood issues, or fatigue reported. Tolerating current medication without adverse effects.          HPI    Past Medical History:   Diagnosis Date    Breast cyst     Cancer ([address]     surgery only    Colon polyp     GERD (gastroesophageal reflux disease)     History of double vision     with surgical correction    Hypertension     Thyroid disease       Past Surgical History:   Procedure Laterality Date    ANKLE FRACTURE SURGERY Right     BACK SURGERY      plating    BREAST RECONSTRUCTION      césar. mastectomy with replacement    COLONOSCOPY  04/19/2007    Dr. Libia Delgado ([facility]) N/A 5/25/2016    Dr Meghann Rutherford non-obstructing simple appearing pancreatic cyst of 2.2 x 1.9mm-no high risk features were seen    EYE SURGERY      recent    HYSTERECTOMY      [facility] TOTAL KNEE ARTHROPLASTY Left 10/17/2017    KNEE TOTAL ARTHROPLASTY performed by Reji Garcia Hilaria Resendez [facility] at [facility]      TUBAL LIGATION      UPPER GASTROINTESTINAL ENDOSCOPY N/A 2016    Dr NNEKA Bhatia-gastritis/gastropathy       Family History   Problem Relation Age of Onset    Colon Cancer Mother     Colon Polyps Mother     Esophageal Cancer Neg Hx     Liver Disease Neg Hx     Lung Cancer Neg Hx     Rectal Cancer Neg Hx     Stomach Cancer Neg Hx        Social History     Tobacco Use    Smoking status: Former Smoker     Packs/day: 1.00     Years: 17.00     Pack years: 17.00     Last attempt to quit: 1985     Years since quittin.9    Smokeless tobacco: Never Used   Substance Use Topics    Alcohol use: Yes     Comment: rare     Current Outpatient Medications   Medication Sig Dispense Refill    Misc.  Devices MISC SHP Pain Cream; ibuprofen 5%; baclofen 2%; lidocaine 2.5%; prilocaine 2.5%; gabapentin 6%  60 grams; 1 to 2 pumps to painful area up to QID as needed 1 Device 5    metoprolol tartrate (LOPRESSOR) 25 MG tablet TAKE 1 TABLET BY MOUTH TWICE DAILY 180 tablet 3    lisinopril (PRINIVIL;ZESTRIL) 20 MG tablet TAKE 2 TABLETS BY MOUTH ONCE DAILY 180 tablet 3    ranitidine (RANITIDINE ACID REDUCER) 75 MG tablet Take 75 mg by mouth 2 times daily      NONFORMULARY       KRILL OIL OMEGA-3 PO Take by mouth daily      docusate sodium (COLACE) 100 MG capsule Take 1 capsule by mouth daily To prevent constipation 20 capsule 0    levothyroxine (SYNTHROID) 50 MCG tablet Take 50 mcg by mouth Daily Indications: Underactive Thyroid      Acetaminophen (TYLENOL 8 HOUR PO) Take 2 tablets by mouth 3 times daily as needed Indications: Pain       Multiple Vitamin (MULTIVITAMIN PO) Take 1 tablet by mouth daily       DiphenhydrAMINE HCl (BENADRYL ALLERGY PO) Take 1 tablet by mouth daily as needed Indications: Seasonal Allergy       TURMERIC PO Take 1,000 mg by mouth daily      Green Tea, Deepika sinensis, (GREEN TEA PO) Take 400 mg by mouth daily as needed  Coenzyme Q10 (COQ10) 200 MG CAPS Take 1 tablet by mouth daily      Cholecalciferol (VITAMIN D3) 2000 units TABS Take by mouth      Probiotic Product (PROBIOTIC-10 PO) Take by mouth      NONFORMULARY Take 1 tablet by mouth daily Indications: focus factor       No current facility-administered medications for this visit. Allergies   Allergen Reactions    Demerol Other (See Comments)     Abdominal spasms with severe n/v/    Meperidine Other (See Comments)    Nsaids Other (See Comments)     Dark tarry stools    Stadol [Butorphanol Tartrate]     Sulfa Antibiotics        Health Maintenance   Topic Date Due    TSH testing  1934    DTaP/Tdap/Td vaccine (1 - Tdap) [birth year]19/1953    Shingles Vaccine (1 of 2) 03/19/1984    DEXA (modify frequency per FRAX score)  03/19/1999    Potassium monitoring  03/08/2019    Creatinine monitoring  03/08/2019    Flu vaccine (Season Ended) 09/01/2019    Pneumococcal 65+ years Vaccine  Completed    Colon cancer screen colonoscopy  Completed       Subjective:      Review of Systems   Constitutional: Negative for chills and fever. HENT: Negative for congestion. Respiratory: Negative for cough, chest tightness and shortness of breath. Cardiovascular: Negative for chest pain, palpitations and leg swelling. Gastrointestinal: Negative for abdominal pain, anal bleeding, constipation, diarrhea and nausea. Genitourinary: Negative for difficulty urinating. Musculoskeletal: Positive for arthralgias, back pain, myalgias and neck pain. Negative for gait problem and joint swelling. Psychiatric/Behavioral: Negative. SeeHPI for visit specific review of symptoms. All others negative      Objective:   /78   Pulse 70   Temp 98.2 °F (36.8 °C) (Temporal)   Wt 144 lb (65.3 kg)   SpO2 98%   BMI 25.11 kg/m²   Physical Exam   Constitutional: She appears well-developed. She does not appear ill. Eyes: Pupils are equal, round, and reactive to light. Neck: Normal range of motion. Neck supple. Cardiovascular: Normal rate and regular rhythm. Exam reveals no friction rub. No murmur heard. Pulmonary/Chest: Effort normal and breath sounds normal. No respiratory distress. She has no wheezes. She has no rales. Abdominal: Soft. Bowel sounds are normal. She exhibits no distension. There is no tenderness. There is no rebound and no guarding. Musculoskeletal: She exhibits no edema. Neurological: She is alert. Psychiatric: She has a normal mood and affect. Her behavior is normal.         No results found for this or any previous visit (from the past 672 hour(s)). Recent Imaging:     radiation information found for this patient   Narrative   EXAMINATION: MRI LUMBAR SPINE WO CONTRAST 2/23/2018 3:26 PM   HISTORY: Chronic midline low back pain without sciatica   COMPARISON: MRI lumbar spine without contrast 4/12/2011   Technical: Multiplanar, multisequence imaging was performed through   the lumbar spine without the use of IV contrast.   FINDINGS:   Review of the visualized paraspinal soft tissues demonstrates no acute   abnormalities. The conus medullaris terminates normally at the level of L1. The   visualized spinal cord appears normal in signal and morphology. There is grade 1 retrolisthesis of L1 on L2 and L2 on L3. There is   grade 1 anterolisthesis of L4 on L5. Surgical hardware is noted at   L4-L5 with an interbody disc spacer in place. Vertebral body heights   appear well maintained. Endplate degenerative spurring is seen   throughout the lumbar spine. No infiltrative marrow process is   identified. Anterior and posterior osteophyte formation is seen at   L1-L2 and L2-L3. There is levoscoliosis of the lumbar spine. Disc levels:   L1-L2: Malalignment, posterior osteophyte formation, and diffuse disc   bulge results in some effacement of the thecal side without   significant thecal sac stenosis.  There is severe bilateral   neuroforaminal narrowing. Loss of normal disc space height and signal   is noted, compatible with disc desiccation. L2-L3: Loss of normal disc space height and signal, compatible with   disc desiccation. Diffuse disc bulge and posterior osteophyte   formation results in some effacement of the thecal sac but no   significant thecal sac stenosis. Facet hypertrophy is noted   bilaterally. There is severe bilateral neuroforaminal narrowing. L3-L4: Loss of normal disc space height and signal, compatible with   disc desiccation. Posterior osteophyte formation and diffuse disc   bulge result in some deformity of the thecal sac without appreciable   thecal sac stenosis. Severe right and moderate left neuroforaminal   narrowing are identified. L4-L5: Interbody disc spacer is in place. Facet hypertrophy is noted   at this level without significant thecal sac stenosis or appreciable   neuroforaminal narrowing. L5-S1: Loss of normal disc space height and signal, compatible with   disc desiccation. Diffuse disc bulge is noted without significant   thecal sac stenosis or neuroforaminal narrowing.       Impression   Postsurgical and multilevel degenerative changes of the   lumbar spine with multilevel neuroforaminal narrowing without   appreciable thecal sac stenosis. Levoscoliosis of the lumbar spine. Signed by Dr Maxim Garduno on 2/23/2018 3:35 PM                               Assessment & Plan: The following diagnoses and conditions are stable with no further orders unless indicated:  1. Chronic midline low back pain without sciatica  Unfortunately continues to be an issue. at the moment, she is primarily taking CBD oil with moderate improvement of her pain. She states the pain itolerable on his current treatment. 2. Bilateral foot pain  Unfortunately, continues to be an issue as well. She does feel the CBD UL is helping    3.  Osteoarthritis of hand, unspecified laterality, unspecified osteoarthritis type  Given a prescription for SHP  pain cream from [facility]. This contains ibuprofen, baclofen, lidocaine, prilocaine, gabapentin            Return in about 3 months (around 7/16/2019) for Routine follow up - 15 minute visit. Discussed use, benefit, and side effects of prescribed medications. All patient questions answered. Pt voiced understanding. Reviewed health maintenance. Instructedto continue current medications, diet and exercise. Patient agreed with treatmentplan. Follow up as directed.

## 2019-04-19 ENCOUNTER — HOSPITAL ENCOUNTER (OUTPATIENT)
Dept: PAIN MANAGEMENT | Age: 84
Discharge: HOME | End: 2019-04-19
Payer: MEDICARE

## 2019-04-19 VITALS
HEIGHT: 63 IN | OXYGEN SATURATION: 99 % | TEMPERATURE: 96.4 F | HEART RATE: 73 BPM | WEIGHT: 142.5 LBS | SYSTOLIC BLOOD PRESSURE: 184 MMHG | RESPIRATION RATE: 18 BRPM | BODY MASS INDEX: 25.25 KG/M2 | DIASTOLIC BLOOD PRESSURE: 85 MMHG

## 2019-04-19 DIAGNOSIS — Z87.898 HISTORY OF MEMORY LOSS: ICD-10-CM

## 2019-04-19 DIAGNOSIS — R41.3 EPISODE OF MEMORY LOSS: Primary | ICD-10-CM

## 2019-04-19 PROCEDURE — 99213 OFFICE O/P EST LOW 20 MIN: CPT

## 2019-04-19 PROCEDURE — 99214 OFFICE O/P EST MOD 30 MIN: CPT | Performed by: NURSE PRACTITIONER

## 2019-04-19 RX ORDER — AMPICILLIN TRIHYDRATE 250 MG
1 CAPSULE ORAL DAILY
COMMUNITY

## 2019-04-19 RX ORDER — CHOLECALCIFEROL (VITAMIN D3) 125 MCG
CAPSULE ORAL
COMMUNITY

## 2019-04-19 ASSESSMENT — PAIN DESCRIPTION - PROGRESSION: CLINICAL_PROGRESSION: NOT CHANGED

## 2019-04-19 ASSESSMENT — ENCOUNTER SYMPTOMS
EYE PAIN: 0
WHEEZING: 0
EYE REDNESS: 0
NAUSEA: 0
CONSTIPATION: 0
BACK PAIN: 1
SHORTNESS OF BREATH: 0
SORE THROAT: 0

## 2019-04-19 ASSESSMENT — PAIN SCALES - GENERAL: PAINLEVEL_OUTOF10: 6

## 2019-04-19 ASSESSMENT — PAIN DESCRIPTION - ORIENTATION: ORIENTATION: LOWER

## 2019-04-19 ASSESSMENT — PAIN - FUNCTIONAL ASSESSMENT: PAIN_FUNCTIONAL_ASSESSMENT: PREVENTS OR INTERFERES SOME ACTIVE ACTIVITIES AND ADLS

## 2019-04-19 ASSESSMENT — PAIN DESCRIPTION - FREQUENCY: FREQUENCY: INTERMITTENT

## 2019-04-19 ASSESSMENT — PAIN DESCRIPTION - ONSET: ONSET: ON-GOING

## 2019-04-19 ASSESSMENT — PAIN DESCRIPTION - LOCATION: LOCATION: BACK

## 2019-04-19 ASSESSMENT — PAIN DESCRIPTION - DESCRIPTORS: DESCRIPTORS: SHARP

## 2019-04-19 ASSESSMENT — PAIN DESCRIPTION - PAIN TYPE: TYPE: CHRONIC PAIN

## 2019-04-19 NOTE — PROGRESS NOTES
[facility] Physical & Pain Medicine  Office Note    Patient Name: Rosas Araujo    MR #: [id number]    Account #: [de-identified]    : 1934    Age: 80 y.o. Sex: female    Date: 2019    PCP: Lyn Lyn MD    Chief Complaint:   Chief Complaint   Patient presents with    Lower Back Pain       History of Present Illness:     Rosas Araujo is a 80 y.o. female who presents to the office for a follow up xray results and procedure. Patient had bilateral lumbar facet injections L3-4, L4-5 on 2/15/2019. Patient reports after having injections she had \"loopy feeling for some days\"- she reports \"euphoria\" on and off for about 6 weeks- no arm or leg weakness, no chest pain, no sob, no resp issues, just \"loopy\". Patient reports she doesn't even remember how she got home after receiving injections. Patient states she does not want to proceed with any further injections at this time. 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 I have read the telephone encounter which states that she was \"out of pain feeling good\". Past Visit HPI:  2019 Initial Visit H&P  The patient is a 80 y.o. female who presented to the office on referral with primary complaints of lower back pain that radiates to both hips. Pt reports chronic lower back pain for years- worked as nurse for years. Pt reports home stretches and exercises. Pt seen PCP, MRI ( DDD and bulging disc)- Pt reports \"Disc Spacer lower back surgically implanted- improved right lateral leg pain\". Pt with occ Tylenol and sent to Dr. Dion Hale. Pt reports \"4-5 years of lumbar injections- apparently Facet injections- will obtain records\". Pt reports \"does not want to be put asleep with injections- pt was not taking opioids\". 100 % back pain. Opioids Prescribed? No   Was medication brought to appointment today?     Current PE    Past PE    Annual Screens:    ORT Score: 0    PHQ-9 Score: 2    Current Pain Assessment  Pain Assessment  Pain Assessment: 0-10  Pain Level: 6  Patient's Stated Pain Goal: No pain  Pain Type: Chronic pain  Pain Location: Back  Pain Orientation: Lower  Pain Descriptors: Sharp  Pain Frequency: Intermittent  Pain Onset: On-going  Clinical Progression: Not changed  Functional Pain Assessment: Prevents or interferes some active activities and ADLs  Non-Pharmaceutical Pain Intervention(s): Repositioned, Rest  Response to Pain Intervention: None  Multiple Pain Sites: No    Employment: Retired    Previous Injury: No    Previous Physical Therapy In the last 6 months? No    Did Physical Therapy make the pain better? N/A     MRI in the two last year? Yes  OF: MRI Lumbar Spine 2/2018 see images below    CT scan in last 12 months? No    X-ray last 12 months? Yes XR Bilateral Hips W AP Pelvis & XR Lumbar Spine Flex/Ext 2/2019    Nerve Conduction Study / EMG? No    Injections in the past? Yes    Did the injections help relieve the pain?  No    Past Medical Histoy  Past Medical History:   Diagnosis Date    Breast cyst     Cancer ([facility] [address]     surgery only    Colon polyp     GERD (gastroesophageal reflux disease)     History of double vision     with surgical correction    Hypertension     Thyroid disease        Surgery History  Past Surgical History:   Procedure Laterality Date    ANKLE FRACTURE SURGERY Right     BACK SURGERY      plating    BREAST RECONSTRUCTION      césar. mastectomy with replacement    COLONOSCOPY  04/19/2007    Dr. Tasha Berrios (LOWER) N/A 5/25/2016    Dr Freddy Lafleur non-obstructing simple appearing pancreatic cyst of 2.2 x 1.9mm-no high risk features were seen    EYE SURGERY      recent    HYSTERECTOMY      OR TOTAL KNEE ARTHROPLASTY Left 10/17/2017    KNEE TOTAL ARTHROPLASTY performed by Estuardo Orozco MD at [address]      UPPER GASTROINTESTINAL ENDOSCOPY N/A 5/25/2016    Dr Pia Riddle Sriram-gastritis/gastropathy        Family History  family history includes Colon Cancer in her mother; Colon Polyps in her mother. Social History    Social History     Tobacco Use    Smoking status: Former Smoker     Packs/day: 1.00     Years: 17.00     Pack years: 17.00     Last attempt to quit: 1985     Years since quittin.9    Smokeless tobacco: Never Used   Substance Use Topics    Alcohol use: Yes     Comment: rare       Allergies  Demerol; Meperidine; Nsaids; Stadol [butorphanol tartrate]; and Sulfa antibiotics     Current Medications  Current Outpatient Medications   Medication Sig Dispense Refill    TURMERIC PO Take 1,000 mg by mouth daily      Green Tea, Deepika sinensis, (GREEN TEA PO) Take 400 mg by mouth daily as needed      Coenzyme Q10 (COQ10) 200 MG CAPS Take 1 tablet by mouth daily      Cholecalciferol (VITAMIN D3) 2000 units TABS Take by mouth      Probiotic Product (PROBIOTIC-10 PO) Take by mouth      NONFORMULARY Take 1 tablet by mouth daily Indications: focus factor      Misc.  Devices MISC SHP Pain Cream; ibuprofen 5%; baclofen 2%; lidocaine 2.5%; prilocaine 2.5%; gabapentin 6%  60 grams; 1 to 2 pumps to painful area up to QID as needed 1 Device 5    metoprolol tartrate (LOPRESSOR) 25 MG tablet TAKE 1 TABLET BY MOUTH TWICE DAILY 180 tablet 3    lisinopril (PRINIVIL;ZESTRIL) 20 MG tablet TAKE 2 TABLETS BY MOUTH ONCE DAILY 180 tablet 3    ranitidine (RANITIDINE ACID REDUCER) 75 MG tablet Take 75 mg by mouth 2 times daily      NONFORMULARY       KRILL OIL OMEGA-3 PO Take by mouth daily      docusate sodium (COLACE) 100 MG capsule Take 1 capsule by mouth daily To prevent constipation 20 capsule 0    levothyroxine (SYNTHROID) 50 MCG tablet Take 50 mcg by mouth Daily Indications: Underactive Thyroid      Acetaminophen (TYLENOL 8 HOUR PO) Take 2 tablets by mouth 3 times daily as needed Indications: Pain       Multiple Vitamin (MULTIVITAMIN PO) Take 1 tablet by mouth daily  DiphenhydrAMINE HCl (BENADRYL ALLERGY PO) Take 1 tablet by mouth daily as needed Indications: Seasonal Allergy        No current facility-administered medications for this encounter. Review of Systems:  Review of Systems   Constitutional: Negative for chills and fever. HENT: Negative for nosebleeds and sore throat. Eyes: Negative for pain and redness. Respiratory: Negative for shortness of breath and wheezing. Cardiovascular: Negative for chest pain. Gastrointestinal: Negative for constipation and nausea. Genitourinary: Negative for dysuria and hematuria. Musculoskeletal: Positive for arthralgias, back pain and myalgias. Skin: Negative for rash. Neurological: Negative for seizures and syncope. Hematological: Does not bruise/bleed easily. Psychiatric/Behavioral: Negative for dysphoric mood and suicidal ideas. 14 point ROS negative besides that noted in HPI      Vitals:    04/19/19 1504   BP: (!) 184/85   Pulse: 73   Resp: 18   Temp: 96.4 °F (35.8 °C)   TempSrc: Oral   SpO2: 99%   Weight: 142 lb 8 oz (64.6 kg)   Height: 5' 3\" (1.6 m)       Body mass index is 25.24 kg/m². Physical Exam   Constitutional: She is oriented to person, place, and time. She appears well-developed and well-nourished. No distress. HENT:   Head: Normocephalic and atraumatic. Right Ear: External ear normal.   Left Ear: External ear normal.   Nose: Nose normal.   Eyes: Pupils are equal, round, and reactive to light. Conjunctivae and EOM are normal. Right eye exhibits no discharge. Left eye exhibits no discharge. Neck: Normal range of motion. Neck supple. Cardiovascular: Normal rate and regular rhythm. Pulmonary/Chest: Effort normal and breath sounds normal. No stridor. No respiratory distress. Abdominal: Soft. Bowel sounds are normal. She exhibits no distension. There is no tenderness.    Musculoskeletal:        Lumbar back: She exhibits decreased range of motion ( Lumbar flexion and ext improved  tenderness) and tenderness ( lumbar muscle ttp ). Neurological: She is alert and oriented to person, place, and time. She exhibits normal muscle tone. She displays no seizure activity. Coordination and gait normal.   Reflex Scores:       Tricep reflexes are 2+ on the right side and 2+ on the left side. Bicep reflexes are 2+ on the right side and 2+ on the left side. Brachioradialis reflexes are 2+ on the right side and 2+ on the left side. Patellar reflexes are 2+ on the right side and 2+ on the left side. Achilles reflexes are 2+ on the right side and 2+ on the left side. Bilateral upper arm strength essentially equal-and  equal      Bilateral lower leg strength essentially equal negative footdrop   Skin: Skin is warm and dry. She is not diaphoretic. No erythema. No pallor. Psychiatric: She has a normal mood and affect. Her behavior is normal. Judgment and thought content normal. She is not aggressive and not hyperactive. She expresses no homicidal and no suicidal ideation. Nursing note and vitals reviewed. LAST UDS: Today    Labs:  Lab Results   Component Value Date     03/08/2018    K 4.3 03/08/2018     03/08/2018    CO2 25 03/08/2018    BUN 20 03/08/2018    CREATININE 0.9 03/08/2018    GLUCOSE 93 03/08/2018    CALCIUM 9.8 03/08/2018        Lab Results   Component Value Date    WBC 6.5 03/08/2018    HGB 11.2 (L) 03/08/2018    HCT 37.0 03/08/2018    MCV 85.6 03/08/2018     03/08/2018       Recent Imaging:  XR bilateral hip pain  Bilateral hips: Frontal view the pelvis as well as frontal and  frog-leg lateral views of the right and left hip are obtained. There are postoperative changes of the lower lumbar spine. Vascular  calcifications seen within the pelvis. There is osteopenia. There is left greater than right hip joint space  narrowing. No fracture or dislocation is identified.  No suspicious  focal bony lesions are visualized.     Impression  1. Osteopenia with left greater than right hip joint space narrowing. No acute radiographic bony abnormality. Signed by Dr Darron Phillips on 2/6/2019 4:10 PM    XR Lumbosacral spondylosis  Lumbar spine: Lateral views lumbar spine obtained in the neutral  position as well as with flexion and extension. COMPARISON: MRI exam 2/23/2018  FINDINGS: There is lateral fusion at the L4/5 level. Mild  retrolisthesis of L5 in reference to L4 is similar measuring  approximately 5 mm. Mild anterolisthesis of L4 in reference to L3  measures only 2 mm. Minimal anterolisthesis of L2 in reference to L1  and remains similar measuring 2 mm. No significant movement is seen  with flexion or extension. There is multilevel degenerative disc  narrowing seen throughout the lumbar spine with mild endplate  spurring. Degenerative facet changes are suspected. There are vascular  calcifications.     Impression  1. Lateral fusion at the L4/5 level. Grade 1 spondylolisthesis most  apparent at L4/5 but stable with flexion and extension. Signed by Dr Darron Phillips on 2/6/2019 4:13 PM    MRI LUMBAR SPINE [address] 2/23/2018 3:26 PM  HISTORY: Chronic midline low back pain without sciatica  COMPARISON: MRI lumbar spine without contrast 4/12/2011  Technical: Multiplanar, multisequence imaging was performed through  the lumbar spine without the use of IV contrast.  FINDINGS:  Review of the visualized paraspinal soft tissues demonstrates no acute  abnormalities. The conus medullaris terminates normally at the level of L1. The  visualized spinal cord appears normal in signal and morphology. There is grade 1 retrolisthesis of L1 on L2 and L2 on L3. There is  grade 1 anterolisthesis of L4 on L5. Surgical hardware is noted at  L4-L5 with an interbody disc spacer in place. Vertebral body heights  appear well maintained. Endplate degenerative spurring is seen  throughout the lumbar spine.  No infiltrative marrow process is  identified. Anterior and posterior osteophyte formation is seen at  L1-L2 and L2-L3. There is levoscoliosis of the lumbar spine. Disc levels:  L1-L2: Malalignment, posterior osteophyte formation, and diffuse disc  bulge results in some effacement of the thecal side without  significant thecal sac stenosis. There is severe bilateral  neuroforaminal narrowing. Loss of normal disc space height and signal  is noted, compatible with disc desiccation. L2-L3: Loss of normal disc space height and signal, compatible with  disc desiccation. Diffuse disc bulge and posterior osteophyte  formation results in some effacement of the thecal sac but no  significant thecal sac stenosis. Facet hypertrophy is noted  bilaterally. There is severe bilateral neuroforaminal narrowing. L3-L4: Loss of normal disc space height and signal, compatible with  disc desiccation. Posterior osteophyte formation and diffuse disc  bulge result in some deformity of the thecal sac without appreciable  thecal sac stenosis. Severe right and moderate left neuroforaminal  narrowing are identified. L4-L5: Interbody disc spacer is in place. Facet hypertrophy is noted  at this level without significant thecal sac stenosis or appreciable  neuroforaminal narrowing. L5-S1: Loss of normal disc space height and signal, compatible with  disc desiccation. Diffuse disc bulge is noted without significant  thecal sac stenosis or neuroforaminal narrowing.     Impression  Postsurgical and multilevel degenerative changes of the  lumbar spine with multilevel neuroforaminal narrowing without  appreciable thecal sac stenosis. Levoscoliosis of the lumbar spine. Signed by Dr Luiza Wagner on 2/23/2018 3:35 PM      Principal Problem:    Lumbosacral spondylosis without myelopathy  Active Problems:    Hx of spinal surgery    DDD (degenerative disc disease), lumbar    History of memory loss  Resolved Problems:    * No resolved hospital problems. *      Plan:  1.  Recover notes from Dr. Sylvie Molina ( injections). 2. Flex and ext Xray lumbar spine discussed   3. Xray bilateral hip and pelvis discussed. 4. Withhold L3-l4, l4-l5 bilateral facet injections Dr. Black [place] w/o sedation. 5. Pt uses CBD oil. I have cautioned use- especially with situation with recent             \"loopiness\". 6. I have gave sample of Pennsaid with instructions and risk. 7. Referral neurology related to \"memory loss\". Dr. Sam Martinez. 8. I have discussed if any change in neurological status seek emergency asst.   9. Follow 6 weeks. Discussion: Discussed exam findings and plan of care with patient. Patient agreed with POC and questions were asked and answered. I have discussed in great detail- referral to neurology for \"recent memory loss\". Pt agrees and wants to follow up in 6 weeks. Activity: discussed exercise as beneficial to pain reduction, encouraged stretching exercisewith a focus on torso strengthening. Education Provided by Provider:  Review of [employer]per / Agreement Review / ORT Calculated / PHQ-9 Reviewed / Compliance Issues Discussed / Cognitive Behavior Needs / Exercise / Review of Test / Financial Issues / Teaching / Smoking Cessation / Tobacco/Alcohol Use    Controlled Substance Monitoring:   Discussed with patient possible medication side effects, risk of tolerance, dependenceand alternative treatments. Discussed the growing epidemic in the [place] with the overprescribing and at times the abuse of narcotics. Discussed the detrimental effects of long term narcotic use in younger patients. Patientencouraged to set daily goals of exercising and if on narcotics decreasing daily narcotic intake. Discussed with the patient about the development of hyperalgesia with long term narcotic intake.      CC:  MD Franki Hart APRN, 4/30/2019 at 8:43 AM    EMR dragon/transcription disclaimer: Much of this encounter note is electronic transcription/translation of spoken language to printed tach. Electronic translation of spoken language may be erroneous, or at times, nonsensical words or phrases may be inadvertently transcribed.  Although, I have reviewed the note for such errors, some may still exist.    Education Provided by Nurse  Review of Marianela Lanie / Agreement Review / PEG Calculated / PHQ-9 Reviewed / Compliance Issues Discussed / Cognitive Behavior Needs / Exercise / Review of Test / Financial Issues / Teaching / Smoking Cessation / Tobacco/Alcohol Use   Electronically signed by Dorina Vargas RN on 4/19/19 at 4:08 PM    Providers Plan   Outgoing Referral / Pharmacy Consult / Test ordered / Obtained Test Results / Consults    New/HP Patient Picture Obtained / Prescriptions ordered

## 2019-04-22 ENCOUNTER — TELEPHONE (OUTPATIENT)
Dept: NEUROLOGY | Age: 84
End: 2019-04-22

## 2019-04-22 NOTE — TELEPHONE ENCOUNTER
Received a referral from [facility] for this patient. Called patient to let her know when I have her scheduled an appointment at our office with Dr. Bran Meraz. NO answer. Left a VM regarding the appointment time/date/location/phone#.

## 2019-04-23 ASSESSMENT — ENCOUNTER SYMPTOMS
DIARRHEA: 0
SHORTNESS OF BREATH: 0
BACK PAIN: 1
CONSTIPATION: 0
COUGH: 0
ABDOMINAL PAIN: 0
CHEST TIGHTNESS: 0
NAUSEA: 0
ANAL BLEEDING: 0

## 2019-04-30 PROBLEM — Z87.898 HISTORY OF MEMORY LOSS: Status: ACTIVE | Noted: 2019-04-30

## 2019-06-07 ENCOUNTER — OFFICE VISIT (OUTPATIENT)
Dept: FAMILY MEDICINE CLINIC | Age: 84
End: 2019-06-07
Payer: MEDICARE

## 2019-06-07 VITALS
HEART RATE: 77 BPM | HEIGHT: 63 IN | OXYGEN SATURATION: 97 % | BODY MASS INDEX: 25.52 KG/M2 | DIASTOLIC BLOOD PRESSURE: 82 MMHG | WEIGHT: 144 LBS | TEMPERATURE: 98.1 F | SYSTOLIC BLOOD PRESSURE: 148 MMHG

## 2019-06-07 DIAGNOSIS — Z78.9 STATIN INTOLERANCE: ICD-10-CM

## 2019-06-07 DIAGNOSIS — G89.29 CHRONIC MIDLINE LOW BACK PAIN WITHOUT SCIATICA: ICD-10-CM

## 2019-06-07 DIAGNOSIS — Z00.00 ANNUAL PHYSICAL EXAM: Primary | ICD-10-CM

## 2019-06-07 DIAGNOSIS — E03.9 PRIMARY HYPOTHYROIDISM: ICD-10-CM

## 2019-06-07 DIAGNOSIS — M15.9 PRIMARY OSTEOARTHRITIS INVOLVING MULTIPLE JOINTS: ICD-10-CM

## 2019-06-07 DIAGNOSIS — E78.01 FAMILIAL HYPERCHOLESTEROLEMIA: ICD-10-CM

## 2019-06-07 DIAGNOSIS — K21.9 GASTROESOPHAGEAL REFLUX DISEASE WITHOUT ESOPHAGITIS: ICD-10-CM

## 2019-06-07 DIAGNOSIS — I10 ESSENTIAL (PRIMARY) HYPERTENSION: ICD-10-CM

## 2019-06-07 DIAGNOSIS — M54.50 CHRONIC MIDLINE LOW BACK PAIN WITHOUT SCIATICA: ICD-10-CM

## 2019-06-07 PROCEDURE — 4040F PNEUMOC VAC/ADMIN/RCVD: CPT | Performed by: FAMILY MEDICINE

## 2019-06-07 PROCEDURE — 1123F ACP DISCUSS/DSCN MKR DOCD: CPT | Performed by: FAMILY MEDICINE

## 2019-06-07 PROCEDURE — G8427 DOCREV CUR MEDS BY ELIG CLIN: HCPCS | Performed by: FAMILY MEDICINE

## 2019-06-07 PROCEDURE — G0439 PPPS, SUBSEQ VISIT: HCPCS | Performed by: FAMILY MEDICINE

## 2019-06-07 PROCEDURE — 1090F PRES/ABSN URINE INCON ASSESS: CPT | Performed by: FAMILY MEDICINE

## 2019-06-07 PROCEDURE — G8419 CALC BMI OUT NRM PARAM NOF/U: HCPCS | Performed by: FAMILY MEDICINE

## 2019-06-07 PROCEDURE — 99214 OFFICE O/P EST MOD 30 MIN: CPT | Performed by: FAMILY MEDICINE

## 2019-06-07 PROCEDURE — G8400 PT W/DXA NO RESULTS DOC: HCPCS | Performed by: FAMILY MEDICINE

## 2019-06-07 PROCEDURE — 1036F TOBACCO NON-USER: CPT | Performed by: FAMILY MEDICINE

## 2019-06-07 ASSESSMENT — ANXIETY QUESTIONNAIRES: GAD7 TOTAL SCORE: 0

## 2019-06-07 ASSESSMENT — LIFESTYLE VARIABLES: HOW OFTEN DO YOU HAVE A DRINK CONTAINING ALCOHOL: 0

## 2019-06-07 ASSESSMENT — PATIENT HEALTH QUESTIONNAIRE - PHQ9
SUM OF ALL RESPONSES TO PHQ QUESTIONS 1-9: 0
SUM OF ALL RESPONSES TO PHQ QUESTIONS 1-9: 0

## 2019-06-07 NOTE — PROGRESS NOTES
Take 1 capsule by mouth daily To prevent constipation Yes Naheed Hampton MD   levothyroxine (SYNTHROID) 50 MCG tablet Take 50 mcg by mouth Daily Indications: Underactive Thyroid Yes Historical Provider, MD   Acetaminophen (TYLENOL 8 HOUR PO) Take 2 tablets by mouth 3 times daily as needed Indications: Pain  Yes Historical Provider, MD   Multiple Vitamin (MULTIVITAMIN PO) Take 1 tablet by mouth daily  Yes Historical Provider, MD   DiphenhydrAMINE HCl (BENADRYL ALLERGY PO) Take 1 tablet by mouth daily as needed Indications: Seasonal Allergy  Yes Historical Provider, MD   Misc.  Devices MISC SHP Pain Cream; ibuprofen 5%; baclofen 2%; lidocaine 2.5%; prilocaine 2.5%; gabapentin 6%  60 grams; 1 to 2 pumps to painful area up to QID as needed  Arlene Reyes MD       Past Medical History:   Diagnosis Date    Breast cyst     Cancer [facility])     surgery only    Colon polyp     GERD (gastroesophageal reflux disease)     History of double vision     with surgical correction    Hypertension     Thyroid disease        Past Surgical History:   Procedure Laterality Date    ANKLE FRACTURE SURGERY Right     BACK SURGERY      plating    BREAST RECONSTRUCTION      césar. mastectomy with replacement    COLONOSCOPY  04/19/2007    Dr. Garcia Baraboo (LOWER) N/A 5/25/2016    Dr Sabina Huggins non-obstructing simple appearing pancreatic cyst of 2.2 x 1.9mm-no high risk features were seen    EYE SURGERY      recent    HYSTERECTOMY      MS TOTAL KNEE ARTHROPLASTY Left 10/17/2017    KNEE TOTAL ARTHROPLASTY performed by Naheed Hampton MD at [address]      UPPER GASTROINTESTINAL ENDOSCOPY N/A 5/25/2016    Dr NNEKA Bhatia-gastritis/gastropathy       Family History   Problem Relation Age of Onset    Colon Cancer Mother     Colon Polyps Mother     Esophageal Cancer Neg Hx     Liver Disease Neg Hx     Lung Cancer Neg Hx     Rectal Cancer Neg Hx     Stomach Cancer Neg Hx        CareTeam (Including outside providers/suppliers regularly involved in providing care):   Patient Care Team:  Sea Alvarez MD as PCP - General (Family Medicine)  Sea Alvarez MD as PCP - [facility] Empaneled Provider  ELTON Thomas (Family Nurse Practitioner)    Wt Readings from Last 3 Encounters:   19 144 lb (65.3 kg)   19 142 lb 8 oz (64.6 kg)   19 144 lb (65.3 kg)     Vitals:    19 1453   BP: (!) 182/94   Pulse: 77   Temp: 98.1 °F (36.7 °C)   SpO2: 97%   Weight: 144 lb (65.3 kg)   Height: 5' 3\" (1.6 m)           The following problems were reviewed today and where indicated follow up appointments were made and/or referrals ordered. Risk Factor Screenings with Interventions     Fall Risk:  Timed Up and Go Test > 12 seconds?: no  2 or more falls in past year?: no  Fall with injury in past year?: no  Fall Risk Interventions:    · Not indicated     Depression:  PHQ-2 Score: 0  Depression Interventions:  · Not indicated     Anxiety:  Anxiety Score: 0  Anxiety Interventions:  · Not indicated     Cognitive:  Clock Drawing Test (CDT) Score: Normal  Cognitive Impairment Interventions:  · Not indicated     Substance Abuse:  Social History     Socioeconomic History    Marital status:       Spouse name: Not on file    Number of children: Not on file    Years of education: Not on file    Highest education level: Not on file   Occupational History    Not on file   Social Needs    Financial resource strain: Not on file    Food insecurity:     Worry: Not on file     Inability: Not on file    Transportation needs:     Medical: Not on file     Non-medical: Not on file   Tobacco Use    Smoking status: Former Smoker     Packs/day: 1.00     Years: 17.00     Pack years: 17.00     Last attempt to quit: 1985     Years since quittin.0    Smokeless tobacco: Never Used   Substance and Sexual Activity    Alcohol use: Yes     Comment: rare    Drug use: No    Sexual activity: Not on file   Lifestyle    Physical activity:     Days per week: Not on file     Minutes per session: Not on file    Stress: Not on file   Relationships    Social connections:     Talks on phone: Not on file     Gets together: Not on file     Attends [employer]ism service: Not on file     Active member of club or organization: Not on file     Attends meetings of clubs or organizations: Not on file     Relationship status: Not on file    Intimate partner violence:     Fear of current or ex partner: Not on file     Emotionally abused: Not on file     Physically abused: Not on file     Forced sexual activity: Not on file   Other Topics Concern    Not on file   Social History Narrative    Not on file     Audit Questionnaire: Screen for Alcohol Misuse  How often do you have a drink containing alcohol?: Never  Substance Abuse Interventions:  · Not indicated     Health Risk Assessment:     General  In general, how would you say your health is?: Good  In the past 7 days, have you experienced any of the following? New or Increased Pain, New or Increased Fatigue, Loneliness, Social Isolation, Stress or Anger?: (!) Stress, New or Increased Pain  Do you get the social and emotional support that you need?: Yes  Do you have a Living Will?: Yes  General Health Risk Interventions:  · Continues to have chronic pain issues. Addressed today and previously    Health Habits/Nutrition  Do you exercise for at least 20 minutes 2-3 times per week?: (!) No  Have you lost any weight without trying in the past 3 months?: No  Do you eat fewer than 2 meals per day?: No  Have you seen a dentist within the past year?: (!) No  Body mass index is 25.51 kg/m².   Health Habits/Nutrition Interventions:  · Recommended increasing activity as tolerated    Hearing/Vision  Do you or your family notice any trouble with your hearing?: No  Do you have difficulty driving, watching TV, or doing any of your daily activities because of your eyesight?: No  Have you had an eye exam within the past year?: Yes  Hearing/Vision Interventions:  · Not indicated    Safety  Do you have working smoke detectors?: Yes  Have all throw rugs been removed or fastened?: Yes  Do you have non-slip mats in all bathtubs?: Yes  Do all of your stairways have a railing or banister?: Yes  Are your doorways, halls and stairs free of clutter?: Yes  Do you always fasten your seatbelt when you are in a car?: Yes  Safety Interventions:  · Not indicated    ADLs  In the past 7 days, did you need help from others to perform any of the following everyday activities? Eating, dressing, grooming, bathing, toileting, or walking/balance?: None  In the past 7 days, did you need help from others to take care of any of the following? Laundry, housekeeping, banking/finances, shopping, telephone use, food preparation, transportation, or taking medications?: None  ADL Interventions:  · Not indicated    Personalized Preventive Plan   Current Health Maintenance Status  Immunization History   Administered Date(s) Administered    Pneumococcal 13-valent Conjugate ([id number]) 09/28/2015    Pneumococcal Polysaccharide ([id number]) 09/29/2017        Health Maintenance   Topic Date Due    TSH testing  1934    DTaP/Tdap/Td vaccine (1 - Tdap) 03/19/1953    Shingles Vaccine (1 of 2) 03/19/1984    DEXA (modify frequency per FRAX score)  03/19/1999    Potassium monitoring  03/08/2019    Creatinine monitoring  03/08/2019    Flu vaccine (Season Ended) 09/01/2019    Pneumococcal 65+ years Vaccine  Completed    Colon cancer screen colonoscopy  Completed       Recommendations for Preventive Services Due: see orders.   Recommended screening schedule for the next 5-10 years is provided to the patient in written form: see Patient Instructions/AVS.

## 2019-06-07 NOTE — PROGRESS NOTES
ULTRASOUND (LOWER) N/A 2016    Dr Armando Saab non-obstructing simple appearing pancreatic cyst of 2.2 x 1.9mm-no high risk features were seen    EYE SURGERY      recent    HYSTERECTOMY      NE TOTAL KNEE ARTHROPLASTY Left 10/17/2017    KNEE TOTAL ARTHROPLASTY performed by Lauro Ash MD at [address]      TUBAL LIGATION      UPPER GASTROINTESTINAL ENDOSCOPY N/A 2016    Dr NNEKA Bhatia-gastritis/gastropathy       Family History   Problem Relation Age of Onset    Colon Cancer Mother     Colon Polyps Mother     Esophageal Cancer Neg Hx     Liver Disease Neg Hx     Lung Cancer Neg Hx     Rectal Cancer Neg Hx     Stomach Cancer Neg Hx        Social History     Tobacco Use    Smoking status: Former Smoker     Packs/day: 1.00     Years: 17.00     Pack years: 17.00     Last attempt to quit: 1985     Years since quittin.0    Smokeless tobacco: Never Used   Substance Use Topics    Alcohol use: Yes     Comment: rare     Current Outpatient Medications   Medication Sig Dispense Refill    TURMERIC PO Take 1,000 mg by mouth daily      Green Tea, Deepika sinensis, (GREEN TEA PO) Take 400 mg by mouth daily as needed      Coenzyme Q10 (COQ10) 200 MG CAPS Take 1 tablet by mouth daily      Cholecalciferol (VITAMIN D3) 2000 units TABS Take by mouth      Probiotic Product (PROBIOTIC-10 PO) Take by mouth      NONFORMULARY Take 1 tablet by mouth daily Indications: focus factor      metoprolol tartrate (LOPRESSOR) 25 MG tablet TAKE 1 TABLET BY MOUTH TWICE DAILY 180 tablet 3    lisinopril (PRINIVIL;ZESTRIL) 20 MG tablet TAKE 2 TABLETS BY MOUTH ONCE DAILY 180 tablet 3    ranitidine (RANITIDINE ACID REDUCER) 75 MG tablet Take 75 mg by mouth 2 times daily      NONFORMULARY       KRILL OIL OMEGA-3 PO Take by mouth daily      docusate sodium (COLACE) 100 MG capsule Take 1 capsule by mouth daily To prevent constipation 20 capsule 0    levothyroxine (SYNTHROID) 50 MCG tablet Take 50 mcg by mouth Daily Indications: Underactive Thyroid      Acetaminophen (TYLENOL 8 HOUR PO) Take 2 tablets by mouth 3 times daily as needed Indications: Pain       Multiple Vitamin (MULTIVITAMIN PO) Take 1 tablet by mouth daily       DiphenhydrAMINE HCl (BENADRYL ALLERGY PO) Take 1 tablet by mouth daily as needed Indications: Seasonal Allergy       Misc. Devices MISC SHP Pain Cream; ibuprofen 5%; baclofen 2%; lidocaine 2.5%; prilocaine 2.5%; gabapentin 6%  60 grams; 1 to 2 pumps to painful area up to QID as needed 1 Device 5     No current facility-administered medications for this visit. Allergies   Allergen Reactions    Demerol Other (See Comments)     Abdominal spasms with severe n/v/    Meperidine Other (See Comments)    Nsaids Other (See Comments)     Dark tarry stools    Stadol [Butorphanol Tartrate]     Sulfa Antibiotics        Health Maintenance   Topic Date Due    TSH testing  1934    DTaP/Tdap/Td vaccine (1 - Tdap) 03/19/1953    Shingles Vaccine (1 of 2) 03/19/1984    DEXA (modify frequency per FRAX score)  03/19/1999    Potassium monitoring  03/08/2019    Creatinine monitoring  03/08/2019    Flu vaccine (Season Ended) 09/01/2019    Pneumococcal 65+ years Vaccine  Completed    Colon cancer screen colonoscopy  Completed       Subjective:      Review of Systems   Constitutional: Negative for chills and fever. HENT: Negative for congestion. Respiratory: Negative for cough, chest tightness and shortness of breath. Cardiovascular: Negative for chest pain, palpitations and leg swelling. Gastrointestinal: Negative for abdominal pain, anal bleeding, constipation, diarrhea and nausea. Genitourinary: Negative for difficulty urinating. Musculoskeletal: Positive for arthralgias, myalgias and neck pain. Negative for back pain, gait problem and joint swelling. Psychiatric/Behavioral: Negative.           SeeHPI for visit specific review of symptoms. All others negative      Objective:   BP (!) 148/82   Pulse 77   Temp 98.1 °F (36.7 °C)   Ht 5' 3\" (1.6 m)   Wt 144 lb (65.3 kg)   SpO2 97%   BMI 25.51 kg/m²   Physical Exam   Constitutional: She appears well-developed. She does not appear ill. Eyes: Pupils are equal, round, and reactive to light. Neck: Normal range of motion. Neck supple. Cardiovascular: Normal rate and regular rhythm. Exam reveals no friction rub. No murmur heard. Pulmonary/Chest: Effort normal and breath sounds normal. No respiratory distress. She has no wheezes. She has no rales. Abdominal: Soft. Bowel sounds are normal. She exhibits no distension. There is no tenderness. There is no rebound and no guarding. Musculoskeletal: She exhibits no edema. Neurological: She is alert. Psychiatric: She has a normal mood and affect. Her behavior is normal.         No results found for this or any previous visit (from the past 672 hour(s)). Assessment & Plan: The following diagnoses and conditions are stable with no further orders unless indicated:  1. Annual physical exam  Completed annual wellness today. She declines colonoscopy due to concerns associated with anesthesia    2. Chronic midline low back pain without sciatica  We have tried multiple modalities she is currently satisfied with using the CBD oil    3. Gastroesophageal reflux disease without esophagitis  Symptoms are currently stable. She primarily takes over-the-counter ranitidine    4. Essential (primary) hypertension  BP Readings from Last 3 Encounters:   06/11/19 (!) 175/85   06/07/19 (!) 148/82   04/19/19 (!) 184/85     Blood pressure has been elevated on the last 3 visits. She claims she has whitecoat syndrome. She checks her blood pressure at home and has been consistently less than 140/90    5. Primary hypothyroidism  No results found for: TSHFT4, TSH  Recommend we repeat laboratory work prior to next visit    6.  Primary osteoarthritis involving multiple joints  She is satisfied with using CBD oil    7. Statin intolerance  Backspace    8. Familial hypercholesterolemia  Limited data to support initiating statin therapy at this age group            Return in about 6 months (around 12/7/2019) for Routine follow up - 15 minute visit. Discussed use, benefit, and side effects of prescribed medications. All patient questions answered. Pt voiced understanding. Reviewed health maintenance. Instructedto continue current medications, diet and exercise. Patient agreed with treatmentplan. Follow up as directed.

## 2019-06-11 ENCOUNTER — HOSPITAL ENCOUNTER (OUTPATIENT)
Dept: PAIN MANAGEMENT | Age: 84
Discharge: HOME | End: 2019-06-11
Payer: MEDICARE

## 2019-06-11 ENCOUNTER — TELEPHONE (OUTPATIENT)
Dept: FAMILY MEDICINE CLINIC | Age: 84
End: 2019-06-11

## 2019-06-11 VITALS
DIASTOLIC BLOOD PRESSURE: 85 MMHG | TEMPERATURE: 97.8 F | SYSTOLIC BLOOD PRESSURE: 175 MMHG | HEART RATE: 65 BPM | OXYGEN SATURATION: 97 % | HEIGHT: 63 IN | WEIGHT: 139 LBS | BODY MASS INDEX: 24.63 KG/M2 | RESPIRATION RATE: 18 BRPM

## 2019-06-11 DIAGNOSIS — Z87.898 HISTORY OF MEMORY LOSS: Primary | ICD-10-CM

## 2019-06-11 PROCEDURE — 99213 OFFICE O/P EST LOW 20 MIN: CPT

## 2019-06-11 PROCEDURE — 99213 OFFICE O/P EST LOW 20 MIN: CPT | Performed by: NURSE PRACTITIONER

## 2019-06-11 ASSESSMENT — ENCOUNTER SYMPTOMS
BACK PAIN: 1
ABDOMINAL PAIN: 0
WHEEZING: 0
SORE THROAT: 0
EYE PAIN: 0
SHORTNESS OF BREATH: 0
EYE REDNESS: 0
CONSTIPATION: 0

## 2019-06-11 ASSESSMENT — PAIN DESCRIPTION - PAIN TYPE: TYPE: CHRONIC PAIN

## 2019-06-11 ASSESSMENT — PAIN DESCRIPTION - LOCATION: LOCATION: BACK

## 2019-06-11 ASSESSMENT — PAIN DESCRIPTION - ORIENTATION: ORIENTATION: LOWER;MID

## 2019-06-11 ASSESSMENT — PAIN SCALES - GENERAL: PAINLEVEL_OUTOF10: 5

## 2019-06-11 ASSESSMENT — PAIN DESCRIPTION - ONSET: ONSET: ON-GOING

## 2019-06-11 ASSESSMENT — PAIN DESCRIPTION - DESCRIPTORS: DESCRIPTORS: SHARP

## 2019-06-11 ASSESSMENT — PAIN DESCRIPTION - FREQUENCY: FREQUENCY: INTERMITTENT

## 2019-06-11 ASSESSMENT — PAIN - FUNCTIONAL ASSESSMENT: PAIN_FUNCTIONAL_ASSESSMENT: PREVENTS OR INTERFERES SOME ACTIVE ACTIVITIES AND ADLS

## 2019-06-11 ASSESSMENT — PAIN DESCRIPTION - PROGRESSION: CLINICAL_PROGRESSION: NOT CHANGED

## 2019-06-11 NOTE — PROGRESS NOTES
Nursing Admission Record    Current Issues / Falls / ER Visits: Follow up referral to neurology. Patient has not seen neurology, patient cancelled multiple appts with [facility] for consultation r/t memory loss    Percentage of Pain Relief after Last Procedure: N/A    Opioids Prescribed: None    Was Medication Brought to Appt: N/A    Hx of any Neck/Back Surgeries? Yes, Back Surgery    Is Patient currently taking a blood thinner?  None    MRI exams received in the past 2 years:  Yes MRI Lumbar Spine       When 2/2018                                              Where [facility]       Imaging on chart: Yes         Imaging records requested: No    CT exam received during the last 12 months: No       When na                                              Where na       Imaging on chart: No         Imaging records requested: No    X-ray exam received during the last 12 months: Yes XR Lumbar Spine & XR Bilateral Hips/Pelvis       When 2/2019                                              Where [facility]       Imaging on chart: Yes         Imaging records requested: No    Nerve Conduction Study/EMG:  No       When na                                              Where na       Imaging on chart: No         Imaging records requested: No    Physical therapy during the last 6 months: No       When: na                        Where  na    Labs during the last 12 months: Yes       When: 3/2018                                             Where  [facility]    PEG Score: 7.3    Last PEG Score: 5    Annual ORT Score: 0    Annual PHQ Score: 2    Last UDS Results: UDS 4/19/2019    Education Provided:  [x] Review of Desmond Yaneze  [] Agreement Review  [x] PEG Score Calculated [] PHQ Score Calculated [] ORT Score Calculated    [] Compliance Issues Discussed [] Cognitive Behavior Needs [x] Exercise [] Review of Test [] Financial Issues  [x] Tobacco/Alcohol Use Reviewed [x] Teaching [] New Patient [] Picture Obtained    Physician Plan:  [x] Outgoing Referral  [] Pharmacy Consult  [] Test Ordered [] Prescription Ordered/Changed [] Blood Thinner Request Form  [] Obtained Test Results / Consult Notes  [] UDS due at next visit, verified per EPIC      [] Suspected Physical Abuse or Suicide Risk assessed - IF YES COMPLETE QUESTIONS BELOW    If any of the following questions are answered yes - contact attending physician for referral:    Has been considering harming self to escape stress, pain problems? [] YES  [x] NO  Has a suicide plan? [] YES  [x] NO  Has attempted suicide in the past?   [] YES  [x] NO  Has a close friend or family member who committed suicide?   [] YES  [x] NO    Assessment Completed by:  Electronically signed by Kerry Haynes RN on 6/11/2019 at 1:58 PM

## 2019-06-11 NOTE — PROGRESS NOTES
[facility] Physical & Pain Medicine  Office Note    Patient Name: Ester Duverney    MR #: [id number]    Account #: [de-identified]    : 1934    Age: 80 y.o. Sex: female    Date: 2019    PCP: Angela Larios MD    Chief Complaint:   Chief Complaint   Patient presents with    Back Pain       History of Present Illness:     Ester Duverney is a 80 y.o. female who presents to the office for chronic low back pain. I read last pain management note patient had facet injections 2019 reports she had some memory loss. I am referred her to Dr. Ok Andrade. Pt reports \" I did not get a phone call or I dont remember it\". I have read last pain management note. Staff does report that patient became \"agitated\" out front. 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 \"    Past Visit HPI: Last pain management note    Current PE.3    Past PE    ORT Score: 0    PHQ-9 Score: 2    Current Pain Assessment  Pain Assessment  Pain Assessment: 0-10  Pain Level: 5  Patient's Stated Pain Goal: No pain  Pain Type: Chronic pain  Pain Location: Back  Pain Orientation: Lower, Mid  Pain Descriptors: Sharp  Pain Frequency: Intermittent  Pain Onset: On-going  Clinical Progression: Not changed  Functional Pain Assessment: Prevents or interferes some active activities and ADLs  Non-Pharmaceutical Pain Intervention(s): Repositioned, Rest  Response to Pain Intervention: None  Multiple Pain Sites: No    Employment: retired     Past Medical Histoy  Past Medical History:   Diagnosis Date    Breast cyst     Cancer ([facility] [address]     surgery only    Colon polyp     GERD (gastroesophageal reflux disease)     History of double vision     with surgical correction    Hypertension     Thyroid disease        Surgery History  Past Surgical History:   Procedure Laterality Date    ANKLE FRACTURE SURGERY Right     BACK SURGERY      plating    BREAST RECONSTRUCTION      césar. mastectomy with REDUCER) 75 MG tablet Take 75 mg by mouth 2 times daily      NONFORMULARY       KRILL OIL OMEGA-3 PO Take by mouth daily      docusate sodium (COLACE) 100 MG capsule Take 1 capsule by mouth daily To prevent constipation 20 capsule 0    levothyroxine (SYNTHROID) 50 MCG tablet Take 50 mcg by mouth Daily Indications: Underactive Thyroid      Acetaminophen (TYLENOL 8 HOUR PO) Take 2 tablets by mouth 3 times daily as needed Indications: Pain       Multiple Vitamin (MULTIVITAMIN PO) Take 1 tablet by mouth daily       DiphenhydrAMINE HCl (BENADRYL ALLERGY PO) Take 1 tablet by mouth daily as needed Indications: Seasonal Allergy        No current facility-administered medications for this encounter. Review of Systems:  Review of Systems   Constitutional: Negative for chills and fever. HENT: Negative for nosebleeds and sore throat. Eyes: Negative for pain and redness. Respiratory: Negative for shortness of breath and wheezing. Cardiovascular: Negative for chest pain. Gastrointestinal: Negative for abdominal pain and constipation. Genitourinary: Negative for dysuria and hematuria. Musculoskeletal: Positive for back pain and myalgias. Skin: Negative for rash. Neurological: Negative for seizures. Memory loss   Hematological: Does not bruise/bleed easily. Psychiatric/Behavioral: Positive for agitation ( Did exhibit some agitation entering exam room). Negative for hallucinations and suicidal ideas. 14 point ROS negative besides that noted in HPI      Vitals:    06/11/19 1407   BP: (!) 175/85   Pulse: 65   Resp: 18   Temp: 97.8 °F (36.6 °C)   TempSrc: Oral   SpO2: 97%   Weight: 139 lb (63 kg)   Height: 5' 3\" (1.6 m)       Body mass index is 24.62 kg/m². Physical Exam   Constitutional: She is oriented to person, place, and time. She appears well-developed and well-nourished. No distress. HENT:   Head: Normocephalic and atraumatic.    Right Ear: External ear normal.   Left Ear: External ear normal.   Nose: Nose normal.   Eyes: Pupils are equal, round, and reactive to light. Conjunctivae and EOM are normal. Right eye exhibits no discharge. Left eye exhibits no discharge. Neck: Normal range of motion. Neck supple. Cardiovascular: Normal rate and regular rhythm. Pulmonary/Chest: Effort normal and breath sounds normal. No stridor. She has no wheezes. Abdominal: Soft. Bowel sounds are normal. She exhibits no distension. There is no tenderness. Musculoskeletal: She exhibits tenderness. Lumbar back: She exhibits decreased range of motion ( Lumbar flexion less than 70 degrees extension less than 10 degrees) and tenderness. Neurological: She is alert and oriented to person, place, and time. She exhibits normal muscle tone. She displays no seizure activity. Coordination and gait normal.   Reflex Scores:       Tricep reflexes are 2+ on the right side and 2+ on the left side. Bicep reflexes are 2+ on the right side and 2+ on the left side. Brachioradialis reflexes are 2+ on the right side and 2+ on the left side. Patellar reflexes are 2+ on the right side and 2+ on the left side. Achilles reflexes are 2+ on the right side and 2+ on the left side. Skin: Skin is warm and dry. She is not diaphoretic. No erythema. No pallor. Psychiatric: She has a normal mood and affect. Her behavior is normal. Judgment and thought content normal. Her speech is delayed. She is not aggressive and not hyperactive. She expresses no homicidal and no suicidal ideation. She exhibits abnormal recent memory. Patient reports \"have trouble remembering things\". She is alert to name, place, time, and president   Nursing note and vitals reviewed.       LAST UDS: 7/2019 compliant    Labs:  Lab Results   Component Value Date     03/08/2018    K 4.3 03/08/2018     03/08/2018    CO2 25 03/08/2018    BUN 20 03/08/2018    CREATININE 0.9 03/08/2018    GLUCOSE 93 03/08/2018    CALCIUM 9.8 03/08/2018        Lab Results   Component Value Date    WBC 6.5 03/08/2018    HGB 11.2 (L) 03/08/2018    HCT 37.0 03/08/2018    MCV 85.6 03/08/2018     03/08/2018       Recent Imaging:    History: Lumbosacral spondylosis  Lumbar spine: Lateral views lumbar spine obtained in the neutral  position as well as with flexion and extension. COMPARISON: MRI exam 2/23/2018  FINDINGS: There is lateral fusion at the L4/5 level. Mild  retrolisthesis of L5 in reference to L4 is similar measuring  approximately 5 mm. Mild anterolisthesis of L4 in reference to L3  measures only 2 mm. Minimal anterolisthesis of L2 in reference to L1  and remains similar measuring 2 mm. No significant movement is seen  with flexion or extension. There is multilevel degenerative disc  narrowing seen throughout the lumbar spine with mild endplate  spurring. Degenerative facet changes are suspected. There are vascular  calcifications.     Impression  1. Lateral fusion at the L4/5 level. Grade 1 spondylolisthesis most  apparent at L4/5 but stable with flexion and extension. Signed by Dr Niurka Feliciano on 2/6/2019 4:13 PM      Principal Problem:    Lumbosacral spondylosis without myelopathy  Active Problems:    DDD (degenerative disc disease), lumbar    History of memory loss  Resolved Problems:    * No resolved hospital problems. *      Plan:  1. Referral to Neurology- \"memory loss\". Information and number given. Courage patient to see neurology before seeing pain management next visit. 2. Continue using CBD oil at home- risk and warnings given. She reports she does not use a CBD oil as she used to. 3. Pt uses Pennsaid cream samples. Warts that she does better with using Tylenol or Motrin at home  4. Home stretches and exercises  5. UDS check last visit- appropriate  6. Follow up 2 months. 7.  Just any change in neurological status may need emergency follow-up    Discussion: Discussed exam findings and plan of care with patient.

## 2019-06-12 PROBLEM — G89.29 CHRONIC MIDLINE LOW BACK PAIN WITHOUT SCIATICA: Status: RESOLVED | Noted: 2017-09-29 | Resolved: 2019-06-12

## 2019-06-12 PROBLEM — M54.50 CHRONIC MIDLINE LOW BACK PAIN WITHOUT SCIATICA: Status: RESOLVED | Noted: 2017-09-29 | Resolved: 2019-06-12

## 2019-06-12 ASSESSMENT — ENCOUNTER SYMPTOMS
DIARRHEA: 0
CONSTIPATION: 0
NAUSEA: 0
CHEST TIGHTNESS: 0
ABDOMINAL PAIN: 0
SHORTNESS OF BREATH: 0
BACK PAIN: 0
ANAL BLEEDING: 0
COUGH: 0

## 2019-06-18 ENCOUNTER — TELEPHONE (OUTPATIENT)
Dept: NEUROLOGY | Age: 84
End: 2019-06-18

## 2019-08-02 ENCOUNTER — OFFICE VISIT (OUTPATIENT)
Dept: NEUROLOGY | Age: 84
End: 2019-08-02
Payer: MEDICARE

## 2019-08-02 VITALS
DIASTOLIC BLOOD PRESSURE: 87 MMHG | SYSTOLIC BLOOD PRESSURE: 175 MMHG | HEART RATE: 68 BPM | BODY MASS INDEX: 24.8 KG/M2 | WEIGHT: 140 LBS | HEIGHT: 63 IN

## 2019-08-02 DIAGNOSIS — G89.29 CHRONIC LOW BACK PAIN, UNSPECIFIED BACK PAIN LATERALITY, WITH SCIATICA PRESENCE UNSPECIFIED: ICD-10-CM

## 2019-08-02 DIAGNOSIS — R41.3 MEMORY LOSS: Primary | ICD-10-CM

## 2019-08-02 DIAGNOSIS — M54.5 CHRONIC LOW BACK PAIN, UNSPECIFIED BACK PAIN LATERALITY, WITH SCIATICA PRESENCE UNSPECIFIED: ICD-10-CM

## 2019-08-02 DIAGNOSIS — R41.3 MEMORY LOSS: ICD-10-CM

## 2019-08-02 DIAGNOSIS — Z86.79 HISTORY OF HYPERTENSION: ICD-10-CM

## 2019-08-02 LAB
TSH REFLEX FT4: 0.57 UIU/ML (ref 0.35–5.5)
VITAMIN B-12: 727 PG/ML (ref 211–946)

## 2019-08-02 PROCEDURE — 4040F PNEUMOC VAC/ADMIN/RCVD: CPT | Performed by: PSYCHIATRY & NEUROLOGY

## 2019-08-02 PROCEDURE — 99204 OFFICE O/P NEW MOD 45 MIN: CPT | Performed by: PSYCHIATRY & NEUROLOGY

## 2019-08-02 PROCEDURE — 1036F TOBACCO NON-USER: CPT | Performed by: PSYCHIATRY & NEUROLOGY

## 2019-08-02 PROCEDURE — G8400 PT W/DXA NO RESULTS DOC: HCPCS | Performed by: PSYCHIATRY & NEUROLOGY

## 2019-08-02 PROCEDURE — G8420 CALC BMI NORM PARAMETERS: HCPCS | Performed by: PSYCHIATRY & NEUROLOGY

## 2019-08-02 PROCEDURE — 1090F PRES/ABSN URINE INCON ASSESS: CPT | Performed by: PSYCHIATRY & NEUROLOGY

## 2019-08-02 PROCEDURE — G8427 DOCREV CUR MEDS BY ELIG CLIN: HCPCS | Performed by: PSYCHIATRY & NEUROLOGY

## 2019-08-02 PROCEDURE — 1123F ACP DISCUSS/DSCN MKR DOCD: CPT | Performed by: PSYCHIATRY & NEUROLOGY

## 2019-08-02 RX ORDER — TRIAMTERENE AND HYDROCHLOROTHIAZIDE 37.5; 25 MG/1; MG/1
1 TABLET ORAL DAILY PRN
COMMUNITY
End: 2022-02-14

## 2019-08-02 NOTE — PROGRESS NOTES
No bruits noted. Respiration- chest wall appears symmetric, good expansion,   normal effort without use of accessory muscles  Cardiovascular- RRR  Musculoskeletal - no significant wasting of muscles noted, no bony deformities, gait no gross ataxia  Extremities-no clubbing, cyanosis or edema  Skin - warm, dry, and intact. No rash, erythema, or pallor. Psychiatric - mood, affect, and behavior appear normal.      Neurological exam  Awake, alert, fluent oriented x 3 appropriate affect  Attention and concentration appear appropriate  Remote memory intact. She knew our last holiday. Followed complex commands without difficulty. Normal clock drawing. Did well with our [address] [address] president information. Short-term memory at 5 minutes 1/4.  2/4 with cues. Speech normal without dysarthria  No clear issues with language of fund of knowledge    Cranial Nerve Exam   CN II- Visual fields grossly unremarkable. VA adequate. Discs sharp  CN III, IV,VI- PERRLA, EOMI, No nystagmus, conjugate eye movements, no ptosis  CN V-sensation intact to LT over face  CN VII-no facial asymmetry  CN VIII-Hearing intact   CN IX and X- Palate elevates in midline  CN XI-good shoulder shrug  CN XII-Tongue midline with no fasciculations or fibrillations    Motor Exam  V/V throughout upper and lower extremities bilaterally, no cogwheeling, normal tone    Sensory Exam  Sensation intact to light touch , PP  upper and lower extremities bilaterally; normal vibration sense in LE's    Reflexes   2+ biceps bilaterally  2+ brachioradialis  2+ triceps  2+patella  2+ ankle jerks  No clonus ankles  No Huang's sign bilateral hands. No Babinski sign. Tremors- no tremors in hands or head noted    Gait  Normal base and speed  No ataxia.  No Romberg sign    Coordination  Finger to nose and ZANA-unremarkable    No results found for: [id number]  Lab Results   Component Value Date    WBC 6.5 03/08/2018    HGB 11.2 (L) 03/08/2018    HCT 37.0 03/08/2018    MCV 85.6 03/08/2018     03/08/2018     Lab Results   Component Value Date     03/08/2018    K 4.3 03/08/2018     03/08/2018    CO2 25 03/08/2018    BUN 20 03/08/2018    CREATININE 0.9 03/08/2018    GLUCOSE 93 03/08/2018    CALCIUM 9.8 03/08/2018    PROT 7.0 03/08/2018    LABALBU 4.2 03/08/2018    BILITOT 0.4 03/08/2018    ALKPHOS 76 03/08/2018    AST 21 03/08/2018    ALT 10 03/08/2018    LABGLOM 60 (A) 03/08/2018           Assessment    ICD-10-CM    1. Memory loss R41.3 MRI Brain WO Contrast     EEG awake and asleep     Vitamin B12     TSH WITH REFLEX TO FT4   2. Chronic low back pain, unspecified back pain laterality, with sciatica presence unspecified M54.5     G89.29    3. History of hypertension Z86.79        This patient has a nonfocal neurological examination. She does have trouble with her short-term memory. Unclear if she may have had a stroke versus other. May just coincidently be developing degenerative dementia. She will proceed with an MRI of the head, EEG, B12 and TSH and further recommendations will then be made. Neuropsych testing ordered as well. Plan  Orders Placed This Encounter   Procedures    MRI Brain WO Contrast     Assess for hydrocephalus     Standing Status:   Future     Standing Expiration Date:   8/1/2020     Order Specific Question:   Reason for exam:     Answer:   memory loss/dementia    Vitamin B12     Standing Status:   Future     Standing Expiration Date:   8/1/2020    TSH WITH REFLEX TO FT4     Standing Status:   Future     Standing Expiration Date:   8/1/2020    EEG awake and asleep     Standing Status:   Future     Standing Expiration Date:   8/2/2020     Order Specific Question:   Reason for exam:     Answer:   memory loss         No orders of the defined types were placed in this encounter. Pt warned of potential side effects of medication changes/new medicines. They are to call for new or ongoing difficulties.   Return in about 4 weeks (around

## 2019-08-20 ENCOUNTER — HOSPITAL ENCOUNTER (OUTPATIENT)
Dept: MRI IMAGING | Age: 84
Discharge: HOME | End: 2019-08-20
Payer: MEDICARE

## 2019-08-20 ENCOUNTER — HOSPITAL ENCOUNTER (OUTPATIENT)
Dept: NEUROLOGY | Age: 84
Discharge: HOME | End: 2019-08-20
Payer: MEDICARE

## 2019-08-20 DIAGNOSIS — R41.3 MEMORY LOSS: ICD-10-CM

## 2019-08-20 PROCEDURE — 70551 MRI BRAIN STEM W/O DYE: CPT

## 2019-08-20 PROCEDURE — 95816 EEG AWAKE AND DROWSY: CPT | Performed by: PSYCHIATRY & NEUROLOGY

## 2019-08-20 PROCEDURE — 95816 EEG AWAKE AND DROWSY: CPT

## 2019-08-30 ENCOUNTER — OFFICE VISIT (OUTPATIENT)
Dept: NEUROLOGY | Age: 84
End: 2019-08-30
Payer: MEDICARE

## 2019-08-30 VITALS
HEART RATE: 60 BPM | DIASTOLIC BLOOD PRESSURE: 75 MMHG | WEIGHT: 140 LBS | BODY MASS INDEX: 24.8 KG/M2 | SYSTOLIC BLOOD PRESSURE: 165 MMHG | HEIGHT: 63 IN

## 2019-08-30 DIAGNOSIS — Z86.79 HISTORY OF HYPERTENSION: ICD-10-CM

## 2019-08-30 DIAGNOSIS — G89.29 CHRONIC LOW BACK PAIN, UNSPECIFIED BACK PAIN LATERALITY, WITH SCIATICA PRESENCE UNSPECIFIED: ICD-10-CM

## 2019-08-30 DIAGNOSIS — R41.3 MEMORY LOSS: Primary | ICD-10-CM

## 2019-08-30 DIAGNOSIS — M54.5 CHRONIC LOW BACK PAIN, UNSPECIFIED BACK PAIN LATERALITY, WITH SCIATICA PRESENCE UNSPECIFIED: ICD-10-CM

## 2019-08-30 PROCEDURE — 1036F TOBACCO NON-USER: CPT | Performed by: PSYCHIATRY & NEUROLOGY

## 2019-08-30 PROCEDURE — G8400 PT W/DXA NO RESULTS DOC: HCPCS | Performed by: PSYCHIATRY & NEUROLOGY

## 2019-08-30 PROCEDURE — G8420 CALC BMI NORM PARAMETERS: HCPCS | Performed by: PSYCHIATRY & NEUROLOGY

## 2019-08-30 PROCEDURE — 1090F PRES/ABSN URINE INCON ASSESS: CPT | Performed by: PSYCHIATRY & NEUROLOGY

## 2019-08-30 PROCEDURE — 1123F ACP DISCUSS/DSCN MKR DOCD: CPT | Performed by: PSYCHIATRY & NEUROLOGY

## 2019-08-30 PROCEDURE — 4040F PNEUMOC VAC/ADMIN/RCVD: CPT | Performed by: PSYCHIATRY & NEUROLOGY

## 2019-08-30 PROCEDURE — 99214 OFFICE O/P EST MOD 30 MIN: CPT | Performed by: PSYCHIATRY & NEUROLOGY

## 2019-08-30 PROCEDURE — G8427 DOCREV CUR MEDS BY ELIG CLIN: HCPCS | Performed by: PSYCHIATRY & NEUROLOGY

## 2019-08-30 NOTE — PROGRESS NOTES
(gastroesophageal reflux disease)     History of double vision     Hypertension     Thyroid disease        Past Surgical History:   Procedure Laterality Date    ANKLE FRACTURE SURGERY Right     BACK SURGERY      plating    BREAST RECONSTRUCTION      césar. mastectomy with replacement    COLONOSCOPY  2007    Dr. Barbara Parker (LOWER) N/A 2016    Dr Edd Jameson non-obstructing simple appearing pancreatic cyst of 2.2 x 1.9mm-no high risk features were seen    EYE SURGERY      recent    HYSTERECTOMY      RI TOTAL KNEE ARTHROPLASTY Left 10/17/2017    KNEE TOTAL ARTHROPLASTY performed by Aleksandra Oconnor MD at [address]      UPPER GASTROINTESTINAL ENDOSCOPY N/A 2016    Dr NNEKA Bhatia-gastritis/gastropathy       Family History   Problem Relation Age of Onset    Colon Cancer Mother     Colon Polyps Mother     Esophageal Cancer Neg Hx     Liver Disease Neg Hx     Lung Cancer Neg Hx     Rectal Cancer Neg Hx     Stomach Cancer Neg Hx        Allergies   Allergen Reactions    Demerol Other (See Comments)     Abdominal spasms with severe n/v/    Meperidine Other (See Comments)    Nsaids Other (See Comments)     Dark tarry stools    Stadol [Butorphanol Tartrate]     Sulfa Antibiotics        Social History     Socioeconomic History    Marital status:       Spouse name: Not on file    Number of children: Not on file    Years of education: Not on file    Highest education level: Not on file   Occupational History    Not on file   Social Needs    Financial resource strain: Not on file    Food insecurity:     Worry: Not on file     Inability: Not on file    Transportation needs:     Medical: Not on file     Non-medical: Not on file   Tobacco Use    Smoking status: Former Smoker     Packs/day: 1.00     Years: 17.00     Pack years: 17.00     Last attempt to quit: 1985     Years since quittin.2 conjunctiva normal.  Pupils react to light  Ear, nose, throat -hearing intact to finger rub No scars, masses, or lesions over external nose or ears, no atrophy of tongue  Neck-symmetric, no masses noted, no jugular vein distension. No bruits noted. Respiration- chest wall appears symmetric, good expansion,   normal effort without use of accessory muscles  Cardiovascular- RRR  Musculoskeletal - no significant wasting of muscles noted, no bony deformities, gait no gross ataxia  Extremities-no clubbing, cyanosis or edema  Skin - warm, dry, and intact. No rash, erythema, or pallor. Psychiatric - mood, affect, and behavior appear normal.      Neurological exam  Awake, alert, fluent oriented x 3 appropriate affect  Attention and concentration appear appropriate  Remote memory intact. She is able to tell me that our next holiday is in a few days, labor day. .  Followed complex commands without difficulty. Short-term memory at 5 minutes 4/4. Speech normal without dysarthria  No clear issues with language of fund of knowledge    Cranial Nerve Exam   CN II- Visual fields grossly unremarkable. VA adequate.  Discs sharp  CN III, IV,VI- PERRLA, EOMI, No nystagmus, conjugate eye movements, no ptosis  CN VII-no facial asymmetry  CN VIII-Hearing intact   CN IX and X- Palate elevates in midline  CN XI-good shoulder shrug  CN XII-Tongue midline with no fasciculations or fibrillations    Motor Exam  V/V throughout upper and lower extremities bilaterally, no cogwheeling, normal tone        Reflexes   2+ biceps bilaterally  2+ brachioradialis  2+ triceps  2+patella  2+ ankle jerks    Tremors- no tremors in hands or head noted    Gait  Normal base and speed      Coordination  Finger to nose and ZANA-unremarkable    Lab Results   Component Value Date    [id number] 727 08/02/2019     Lab Results   Component Value Date    WBC 6.5 03/08/2018    HGB 11.2 (L) 03/08/2018    HCT 37.0 03/08/2018    MCV 85.6 03/08/2018     03/08/2018

## 2019-09-03 ENCOUNTER — TELEPHONE (OUTPATIENT)
Dept: NEUROLOGY | Age: 84
End: 2019-09-03

## 2019-09-04 ENCOUNTER — TELEPHONE (OUTPATIENT)
Dept: NEUROLOGY | Age: 84
End: 2019-09-04

## 2019-09-05 ENCOUNTER — HOSPITAL ENCOUNTER (OUTPATIENT)
Dept: PAIN MANAGEMENT | Age: 84
Discharge: HOME | End: 2019-09-05
Payer: MEDICARE

## 2019-09-05 ENCOUNTER — TELEPHONE (OUTPATIENT)
Dept: NEUROLOGY | Age: 84
End: 2019-09-05

## 2019-09-05 VITALS
RESPIRATION RATE: 16 BRPM | HEART RATE: 82 BPM | DIASTOLIC BLOOD PRESSURE: 87 MMHG | WEIGHT: 139.5 LBS | SYSTOLIC BLOOD PRESSURE: 179 MMHG | HEIGHT: 63 IN | OXYGEN SATURATION: 98 % | TEMPERATURE: 96.6 F | BODY MASS INDEX: 24.72 KG/M2

## 2019-09-05 PROCEDURE — 99213 OFFICE O/P EST LOW 20 MIN: CPT

## 2019-09-05 PROCEDURE — 99213 OFFICE O/P EST LOW 20 MIN: CPT | Performed by: NURSE PRACTITIONER

## 2019-09-05 ASSESSMENT — PAIN SCALES - GENERAL: PAINLEVEL_OUTOF10: 6

## 2019-09-05 ASSESSMENT — ENCOUNTER SYMPTOMS
SORE THROAT: 0
EYE PAIN: 0
EYE REDNESS: 0
BACK PAIN: 1
SHORTNESS OF BREATH: 0
WHEEZING: 0
CONSTIPATION: 0
ABDOMINAL PAIN: 0
CHEST TIGHTNESS: 0

## 2019-09-05 ASSESSMENT — PAIN - FUNCTIONAL ASSESSMENT: PAIN_FUNCTIONAL_ASSESSMENT: PREVENTS OR INTERFERES SOME ACTIVE ACTIVITIES AND ADLS

## 2019-09-05 ASSESSMENT — PAIN DESCRIPTION - DESCRIPTORS: DESCRIPTORS: SORE

## 2019-09-05 ASSESSMENT — PAIN DESCRIPTION - LOCATION: LOCATION: BACK

## 2019-09-05 ASSESSMENT — PAIN DESCRIPTION - PROGRESSION: CLINICAL_PROGRESSION: NOT CHANGED

## 2019-09-05 ASSESSMENT — PAIN DESCRIPTION - ONSET: ONSET: ON-GOING

## 2019-09-05 ASSESSMENT — PAIN DESCRIPTION - FREQUENCY: FREQUENCY: CONTINUOUS

## 2019-09-05 ASSESSMENT — PAIN DESCRIPTION - PAIN TYPE: TYPE: CHRONIC PAIN

## 2019-09-05 NOTE — PROGRESS NOTES
daily      docusate sodium (COLACE) 100 MG capsule Take 1 capsule by mouth daily To prevent constipation 20 capsule 0    levothyroxine (SYNTHROID) 50 MCG tablet Take 50 mcg by mouth Daily Indications: Underactive Thyroid      Acetaminophen (TYLENOL 8 HOUR PO) Take 2 tablets by mouth 3 times daily as needed Indications: Pain       Multiple Vitamin (MULTIVITAMIN PO) Take 1 tablet by mouth daily       DiphenhydrAMINE HCl (BENADRYL ALLERGY PO) Take 1 tablet by mouth daily as needed Indications: Seasonal Allergy        No current facility-administered medications for this encounter. Review of Systems:  Review of Systems   Constitutional: Negative for chills and fever. HENT: Negative for nosebleeds and sore throat. Eyes: Negative for pain and redness. Respiratory: Negative for shortness of breath and wheezing. Cardiovascular: Negative for chest pain. Gastrointestinal: Negative for abdominal pain and constipation. Genitourinary: Negative for dysuria and hematuria. Musculoskeletal: Positive for back pain and myalgias. Skin: Negative for rash. Neurological: Negative for seizures and syncope. Hematological: Does not bruise/bleed easily. Psychiatric/Behavioral: Negative for dysphoric mood and suicidal ideas. 14 point ROS negative besides that noted in HPI      Vitals:    09/05/19 1533   BP: (!) 179/87   Pulse: 82   Resp: 16   Temp: 96.6 °F (35.9 °C)   SpO2: 98%   Weight: 139 lb 8 oz (63.3 kg)   Height: 5' 3\" (1.6 m)       Body mass index is 24.71 kg/m². Physical Exam   Constitutional: She is oriented to person, place, and time. She appears well-developed and well-nourished. No distress. HENT:   Head: Normocephalic and atraumatic. Right Ear: External ear normal.   Left Ear: External ear normal.   Nose: Nose normal.   Eyes: Pupils are equal, round, and reactive to light. Conjunctivae and EOM are normal. Right eye exhibits no discharge. Left eye exhibits no discharge.    Neck: HCT 37.0 03/08/2018    MCV 85.6 03/08/2018     03/08/2018       Recent Imaging:    Assessment:  Principal Problem:    Lumbosacral spondylosis without myelopathy  Active Problems:    Hx of spinal surgery    DDD (degenerative disc disease), lumbar  Resolved Problems:    * No resolved hospital problems. *      Plan:  1. Pt with Neurology 9/30/2019- follow up EEG, Brain MRI, and memory loss eval.  2. Pt has continued to paint and read. 3. Pt using CBD oil- I have warned and gave information. 4. Pt is interested TPI lumbar. Pt reports had \"3 month block to lumbar spine from Dr. Renate Dia"- will attempt to recover those notes  5. Follow 6 weeks. Discussion: Discussed exam findings and plan of care with patient. Patient agreed with POC and questions were asked and answered. Patient is holding off on injections until neuro logical status follow-ups with neurology. She is interested in CBD oil I discussed this for 20 minutes risk benefits legalities and prescribe ability I gave her information strawberry he will [address] She follows up with neurology 9/30/2019 EEG, brain MRI and evaluations. She does mention about an injection in the back \"sounds to be RFA\". We will attempt to obtain those records. Follow-up 6 weeks    Activity: discussed exercise as beneficial to pain reduction, encouraged stretching exercisewith a focus on torso strengthening. Education Provided by Provider:  Review of Elner Graft / Agreement Review / ORT Calculated / PHQ-9 Reviewed / Compliance Issues Discussed / Cognitive Behavior Needs / Exercise / Review of Test / Financial Issues / Teaching / Smoking Cessation / Tobacco/Alcohol Use    Controlled Substance Monitoring:   Discussed with patient possible medication side effects, risk of tolerance, dependenceand alternative treatments. Discussed the growing epidemic in the [place] with the overprescribing and at times the abuse of narcotics.  Discussed the detrimental effects of

## 2019-09-05 NOTE — PROGRESS NOTES
[facility]  Office Note    Patient Name: Amy Stephen    MR #: [id number]    Account #: [de-identified]    : 1934    Age: 80 y.o. Sex: female    Date: 2019    PCP: Nannette Parnell MD    Chief Complaint:   Chief Complaint   Patient presents with    Lower Back Pain       History of Present Illness:     Amy Stephen is a 80 y.o. female who presents to the office for lower back pain lateral hip pain. No loss of bowel bladder. I read last pain management note 2019. Patient has seen neurology for memory loss issues. Has appointment tomorrow for follow-up as well from neurology. Patient today complains of lower back pain would like to discuss muscle exercise and or injections trigger points. Patient reports that she would like to stay away from steroids at this present time related to \"possibility of causing issues with her memory\".     Past Visit HPI: Pain management note 2019    Current PE    Past PE.3  Annual Screens: Sees Dr. Kristina Block and now seen neurology    ORT Score: 0    PHQ-9 Score: 2    Current Pain Assessment  Pain Assessment  Pain Assessment: 0-10  Pain Level: 6  Patient's Stated Pain Goal: No pain  Pain Type: Chronic pain  Pain Location: Back  Pain Descriptors: Sore  Pain Frequency: Continuous  Pain Onset: On-going  Clinical Progression: Not changed  Functional Pain Assessment: Prevents or interferes some active activities and ADLs    Employment: retired     Past Medical Histoy  Past Medical History:   Diagnosis Date    Breast cyst     Cancer ([facility] [address]     surgery only    Colon polyp     Family history of colon cancer 2016    GERD (gastroesophageal reflux disease)     History of double vision     with surgical correction    Hypertension     Thyroid disease        Surgery History  Past Surgical History:   Procedure Laterality Date    ANKLE FRACTURE SURGERY Right     BACK SURGERY      plating    BREAST RECONSTRUCTION 3    lisinopril (PRINIVIL;ZESTRIL) 20 MG tablet TAKE 2 TABLETS BY MOUTH ONCE DAILY 180 tablet 3    ranitidine (RANITIDINE ACID REDUCER) 75 MG tablet Take 75 mg by mouth 2 times daily      NONFORMULARY       KRILL OIL OMEGA-3 PO Take by mouth daily      docusate sodium (COLACE) 100 MG capsule Take 1 capsule by mouth daily To prevent constipation 20 capsule 0    levothyroxine (SYNTHROID) 50 MCG tablet Take 50 mcg by mouth Daily Indications: Underactive Thyroid      Acetaminophen (TYLENOL 8 HOUR PO) Take 2 tablets by mouth 3 times daily as needed Indications: Pain       Multiple Vitamin (MULTIVITAMIN PO) Take 1 tablet by mouth daily       DiphenhydrAMINE HCl (BENADRYL ALLERGY PO) Take 1 tablet by mouth daily as needed Indications: Seasonal Allergy        No current facility-administered medications for this encounter. Review of Systems:  Review of Systems   Constitutional: Positive for fatigue. Negative for chills, diaphoresis and fever. HENT: Negative for nosebleeds and sore throat. Eyes: Negative for pain and redness. Respiratory: Negative for chest tightness, shortness of breath and wheezing. Cardiovascular: Negative for chest pain. Gastrointestinal: Negative for abdominal pain and constipation. Genitourinary: Negative for dysuria and frequency. Musculoskeletal: Positive for arthralgias, back pain and myalgias. Skin: Negative for rash. Neurological: Negative for dizziness and seizures. Hematological: Does not bruise/bleed easily. Psychiatric/Behavioral: Positive for decreased concentration. Negative for suicidal ideas. 14 point ROS negative besides that noted in HPI      Vitals:    09/05/19 1533   BP: (!) 179/87   Pulse: 82   Resp: 16   Temp: 96.6 °F (35.9 °C)   SpO2: 98%   Weight: 139 lb 8 oz (63.3 kg)   Height: 5' 3\" (1.6 m)       Body mass index is 24.71 kg/m². Physical Exam   Constitutional: She is oriented to person, place, and time.  She appears well-developed and well-nourished. No distress. HENT:   Head: Normocephalic and atraumatic. Right Ear: External ear normal.   Left Ear: External ear normal.   Nose: Nose normal.   Eyes: Pupils are equal, round, and reactive to light. Conjunctivae and EOM are normal. Right eye exhibits no discharge. Left eye exhibits no discharge. Neck: Normal range of motion. Neck supple. Cardiovascular: Normal rate and regular rhythm. Pulmonary/Chest: Effort normal and breath sounds normal. No stridor. She has no wheezes. Abdominal: Soft. Bowel sounds are normal. She exhibits no mass. There is no guarding. Musculoskeletal:        Right hip: She exhibits tenderness. Left hip: She exhibits tenderness. Lumbar back: She exhibits tenderness. Neurological: She is alert and oriented to person, place, and time. She exhibits normal muscle tone. She displays no seizure activity. Coordination and gait normal.   Reflex Scores:       Tricep reflexes are 2+ on the right side and 2+ on the left side. Bicep reflexes are 2+ on the right side and 2+ on the left side. Brachioradialis reflexes are 2+ on the right side and 2+ on the left side. Patellar reflexes are 2+ on the right side and 2+ on the left side. Achilles reflexes are 2+ on the right side and 2+ on the left side. Bilateral upper arm strength essentially equal    Bilateral lower leg strength essentially equal       Skin: Skin is warm and dry. She is not diaphoretic. No erythema. No pallor. Psychiatric: She has a normal mood and affect. Her behavior is normal. Judgment and thought content normal. She is not aggressive and not hyperactive. She expresses no homicidal and no suicidal ideation. Alert and verbal  Talkative and funny   Nursing note and vitals reviewed.       LAST UDS: 4/19/2019    Labs:  Lab Results   Component Value Date     03/08/2018    K 4.3 03/08/2018     03/08/2018    CO2 25 03/08/2018    BUN 20 03/08/2018    CREATININE

## 2019-10-09 ENCOUNTER — OFFICE VISIT (OUTPATIENT)
Dept: NEUROLOGY | Age: 84
End: 2019-10-09
Payer: MEDICARE

## 2019-10-09 VITALS
WEIGHT: 140 LBS | HEIGHT: 63 IN | BODY MASS INDEX: 24.8 KG/M2 | SYSTOLIC BLOOD PRESSURE: 187 MMHG | HEART RATE: 52 BPM | DIASTOLIC BLOOD PRESSURE: 78 MMHG

## 2019-10-09 DIAGNOSIS — Z86.39 HISTORY OF HYPOTHYROIDISM: ICD-10-CM

## 2019-10-09 DIAGNOSIS — Z86.79 HISTORY OF HYPERTENSION: ICD-10-CM

## 2019-10-09 DIAGNOSIS — R41.3 MEMORY LOSS: Primary | ICD-10-CM

## 2019-10-09 PROCEDURE — G8484 FLU IMMUNIZE NO ADMIN: HCPCS | Performed by: PSYCHIATRY & NEUROLOGY

## 2019-10-09 PROCEDURE — 1090F PRES/ABSN URINE INCON ASSESS: CPT | Performed by: PSYCHIATRY & NEUROLOGY

## 2019-10-09 PROCEDURE — G8420 CALC BMI NORM PARAMETERS: HCPCS | Performed by: PSYCHIATRY & NEUROLOGY

## 2019-10-09 PROCEDURE — G8427 DOCREV CUR MEDS BY ELIG CLIN: HCPCS | Performed by: PSYCHIATRY & NEUROLOGY

## 2019-10-09 PROCEDURE — 1036F TOBACCO NON-USER: CPT | Performed by: PSYCHIATRY & NEUROLOGY

## 2019-10-09 PROCEDURE — 96116 NUBHVL XM PHYS/QHP 1ST HR: CPT | Performed by: PSYCHIATRY & NEUROLOGY

## 2019-10-09 PROCEDURE — G8400 PT W/DXA NO RESULTS DOC: HCPCS | Performed by: PSYCHIATRY & NEUROLOGY

## 2019-10-09 PROCEDURE — 99213 OFFICE O/P EST LOW 20 MIN: CPT | Performed by: PSYCHIATRY & NEUROLOGY

## 2019-10-09 PROCEDURE — 1123F ACP DISCUSS/DSCN MKR DOCD: CPT | Performed by: PSYCHIATRY & NEUROLOGY

## 2019-10-09 PROCEDURE — 4040F PNEUMOC VAC/ADMIN/RCVD: CPT | Performed by: PSYCHIATRY & NEUROLOGY

## 2019-10-22 ENCOUNTER — HOSPITAL ENCOUNTER (OUTPATIENT)
Dept: PAIN MANAGEMENT | Age: 84
Discharge: HOME | End: 2019-10-22
Payer: MEDICARE

## 2019-10-22 VITALS
HEART RATE: 76 BPM | OXYGEN SATURATION: 97 % | BODY MASS INDEX: 24.83 KG/M2 | HEIGHT: 63 IN | TEMPERATURE: 97.8 F | RESPIRATION RATE: 16 BRPM | DIASTOLIC BLOOD PRESSURE: 78 MMHG | WEIGHT: 140.13 LBS | SYSTOLIC BLOOD PRESSURE: 161 MMHG

## 2019-10-22 DIAGNOSIS — M47.817 LUMBOSACRAL SPONDYLOSIS WITHOUT MYELOPATHY: Primary | ICD-10-CM

## 2019-10-22 PROCEDURE — 99213 OFFICE O/P EST LOW 20 MIN: CPT

## 2019-10-22 PROCEDURE — 99213 OFFICE O/P EST LOW 20 MIN: CPT | Performed by: NURSE PRACTITIONER

## 2019-10-22 RX ORDER — LIDOCAINE 4 G/G
1 PATCH TOPICAL DAILY
Qty: 30 PATCH | Refills: 2 | Status: SHIPPED | OUTPATIENT
Start: 2019-10-22 | End: 2019-11-21

## 2019-10-22 ASSESSMENT — PAIN DESCRIPTION - ONSET: ONSET: ON-GOING

## 2019-10-22 ASSESSMENT — PAIN DESCRIPTION - FREQUENCY: FREQUENCY: CONTINUOUS

## 2019-10-22 ASSESSMENT — PAIN DESCRIPTION - DESCRIPTORS: DESCRIPTORS: SORE

## 2019-10-22 ASSESSMENT — PAIN DESCRIPTION - PAIN TYPE: TYPE: CHRONIC PAIN

## 2019-10-22 ASSESSMENT — PAIN SCALES - GENERAL: PAINLEVEL_OUTOF10: 6

## 2019-10-22 ASSESSMENT — PAIN DESCRIPTION - PROGRESSION: CLINICAL_PROGRESSION: GRADUALLY WORSENING

## 2019-10-22 ASSESSMENT — PAIN DESCRIPTION - LOCATION: LOCATION: BACK;FOOT;ANKLE

## 2019-10-27 ASSESSMENT — ENCOUNTER SYMPTOMS
CONSTIPATION: 0
WHEEZING: 0
ABDOMINAL PAIN: 0
EYE PAIN: 0
EYE REDNESS: 0
SHORTNESS OF BREATH: 0
BACK PAIN: 1
SORE THROAT: 0

## 2019-12-02 RX ORDER — LEVOTHYROXINE SODIUM 0.05 MG/1
50 TABLET ORAL DAILY
Qty: 30 TABLET | Refills: 3 | Status: SHIPPED | OUTPATIENT
Start: 2019-12-02 | End: 2020-02-26

## 2019-12-10 ENCOUNTER — OFFICE VISIT (OUTPATIENT)
Dept: FAMILY MEDICINE CLINIC | Age: 84
End: 2019-12-10
Payer: MEDICARE

## 2019-12-10 VITALS
DIASTOLIC BLOOD PRESSURE: 72 MMHG | SYSTOLIC BLOOD PRESSURE: 134 MMHG | HEART RATE: 64 BPM | BODY MASS INDEX: 27.28 KG/M2 | OXYGEN SATURATION: 98 % | TEMPERATURE: 97.3 F | WEIGHT: 144.5 LBS | HEIGHT: 61 IN

## 2019-12-10 DIAGNOSIS — M79.672 BILATERAL FOOT PAIN: ICD-10-CM

## 2019-12-10 DIAGNOSIS — E78.01 FAMILIAL HYPERCHOLESTEROLEMIA: ICD-10-CM

## 2019-12-10 DIAGNOSIS — M54.50 CHRONIC MIDLINE LOW BACK PAIN WITHOUT SCIATICA: Primary | ICD-10-CM

## 2019-12-10 DIAGNOSIS — I10 ESSENTIAL (PRIMARY) HYPERTENSION: ICD-10-CM

## 2019-12-10 DIAGNOSIS — G89.29 CHRONIC MIDLINE LOW BACK PAIN WITHOUT SCIATICA: Primary | ICD-10-CM

## 2019-12-10 DIAGNOSIS — E03.9 PRIMARY HYPOTHYROIDISM: ICD-10-CM

## 2019-12-10 DIAGNOSIS — Z78.9 STATIN INTOLERANCE: ICD-10-CM

## 2019-12-10 DIAGNOSIS — M19.049 OSTEOARTHRITIS OF HAND, UNSPECIFIED LATERALITY, UNSPECIFIED OSTEOARTHRITIS TYPE: ICD-10-CM

## 2019-12-10 DIAGNOSIS — M79.671 BILATERAL FOOT PAIN: ICD-10-CM

## 2019-12-10 DIAGNOSIS — Z13.220 LIPID SCREENING: ICD-10-CM

## 2019-12-10 DIAGNOSIS — M15.9 PRIMARY OSTEOARTHRITIS INVOLVING MULTIPLE JOINTS: ICD-10-CM

## 2019-12-10 DIAGNOSIS — K21.9 GASTROESOPHAGEAL REFLUX DISEASE WITHOUT ESOPHAGITIS: ICD-10-CM

## 2019-12-10 PROCEDURE — 1036F TOBACCO NON-USER: CPT | Performed by: FAMILY MEDICINE

## 2019-12-10 PROCEDURE — 1123F ACP DISCUSS/DSCN MKR DOCD: CPT | Performed by: FAMILY MEDICINE

## 2019-12-10 PROCEDURE — G8417 CALC BMI ABV UP PARAM F/U: HCPCS | Performed by: FAMILY MEDICINE

## 2019-12-10 PROCEDURE — G8484 FLU IMMUNIZE NO ADMIN: HCPCS | Performed by: FAMILY MEDICINE

## 2019-12-10 PROCEDURE — 99214 OFFICE O/P EST MOD 30 MIN: CPT | Performed by: FAMILY MEDICINE

## 2019-12-10 PROCEDURE — G8427 DOCREV CUR MEDS BY ELIG CLIN: HCPCS | Performed by: FAMILY MEDICINE

## 2019-12-10 PROCEDURE — 4040F PNEUMOC VAC/ADMIN/RCVD: CPT | Performed by: FAMILY MEDICINE

## 2019-12-10 PROCEDURE — 1090F PRES/ABSN URINE INCON ASSESS: CPT | Performed by: FAMILY MEDICINE

## 2019-12-10 PROCEDURE — G8400 PT W/DXA NO RESULTS DOC: HCPCS | Performed by: FAMILY MEDICINE

## 2019-12-12 ASSESSMENT — ENCOUNTER SYMPTOMS
BACK PAIN: 1
ANAL BLEEDING: 0
CONSTIPATION: 0
CHEST TIGHTNESS: 0
DIARRHEA: 0
COUGH: 0
NAUSEA: 0
ABDOMINAL PAIN: 0
SHORTNESS OF BREATH: 0

## 2020-01-07 ENCOUNTER — TELEPHONE (OUTPATIENT)
Dept: PAIN MANAGEMENT | Age: 85
End: 2020-01-07

## 2020-01-10 LAB
CONTROL: NORMAL
HEMOCCULT STL QL: NORMAL

## 2020-01-10 PROCEDURE — 82274 ASSAY TEST FOR BLOOD FECAL: CPT | Performed by: FAMILY MEDICINE

## 2020-02-06 ENCOUNTER — OFFICE VISIT (OUTPATIENT)
Dept: FAMILY MEDICINE CLINIC | Age: 85
End: 2020-02-06
Payer: MEDICARE

## 2020-02-06 VITALS
OXYGEN SATURATION: 98 % | SYSTOLIC BLOOD PRESSURE: 136 MMHG | BODY MASS INDEX: 25.52 KG/M2 | DIASTOLIC BLOOD PRESSURE: 84 MMHG | HEART RATE: 70 BPM | TEMPERATURE: 97 F | HEIGHT: 63 IN | WEIGHT: 144 LBS

## 2020-02-06 DIAGNOSIS — T14.8XXA BRUISING: ICD-10-CM

## 2020-02-06 LAB
APTT: 26.9 SEC (ref 26–36.2)
BASOPHILS ABSOLUTE: 0.1 K/UL (ref 0–0.2)
BASOPHILS RELATIVE PERCENT: 1 % (ref 0–1)
EOSINOPHILS ABSOLUTE: 0.4 K/UL (ref 0–0.6)
EOSINOPHILS RELATIVE PERCENT: 3.8 % (ref 0–5)
HCT VFR BLD CALC: 41.2 % (ref 37–47)
HEMOGLOBIN: 12.7 G/DL (ref 12–16)
IMMATURE GRANULOCYTES #: 0.1 K/UL
INR BLD: 0.95 (ref 0.88–1.18)
LYMPHOCYTES ABSOLUTE: 2.6 K/UL (ref 1.1–4.5)
LYMPHOCYTES RELATIVE PERCENT: 28 % (ref 20–40)
MCH RBC QN AUTO: 28.2 PG (ref 27–31)
MCHC RBC AUTO-ENTMCNC: 30.8 G/DL (ref 33–37)
MCV RBC AUTO: 91.6 FL (ref 81–99)
MONOCYTES ABSOLUTE: 1 K/UL (ref 0–0.9)
MONOCYTES RELATIVE PERCENT: 10.5 % (ref 0–10)
NEUTROPHILS ABSOLUTE: 5.2 K/UL (ref 1.5–7.5)
NEUTROPHILS RELATIVE PERCENT: 56.2 % (ref 50–65)
PDW BLD-RTO: 15.9 % (ref 11.5–14.5)
PLATELET # BLD: 302 K/UL (ref 130–400)
PMV BLD AUTO: 9.9 FL (ref 9.4–12.3)
PROTHROMBIN TIME: 12.1 SEC (ref 12–14.6)
RBC # BLD: 4.5 M/UL (ref 4.2–5.4)
WBC # BLD: 9.3 K/UL (ref 4.8–10.8)

## 2020-02-06 PROCEDURE — 4040F PNEUMOC VAC/ADMIN/RCVD: CPT | Performed by: FAMILY MEDICINE

## 2020-02-06 PROCEDURE — 1036F TOBACCO NON-USER: CPT | Performed by: FAMILY MEDICINE

## 2020-02-06 PROCEDURE — G8400 PT W/DXA NO RESULTS DOC: HCPCS | Performed by: FAMILY MEDICINE

## 2020-02-06 PROCEDURE — 99214 OFFICE O/P EST MOD 30 MIN: CPT | Performed by: FAMILY MEDICINE

## 2020-02-06 PROCEDURE — G8427 DOCREV CUR MEDS BY ELIG CLIN: HCPCS | Performed by: FAMILY MEDICINE

## 2020-02-06 PROCEDURE — 1090F PRES/ABSN URINE INCON ASSESS: CPT | Performed by: FAMILY MEDICINE

## 2020-02-06 PROCEDURE — G8484 FLU IMMUNIZE NO ADMIN: HCPCS | Performed by: FAMILY MEDICINE

## 2020-02-06 PROCEDURE — 1123F ACP DISCUSS/DSCN MKR DOCD: CPT | Performed by: FAMILY MEDICINE

## 2020-02-06 PROCEDURE — G8417 CALC BMI ABV UP PARAM F/U: HCPCS | Performed by: FAMILY MEDICINE

## 2020-02-14 ENCOUNTER — HOSPITAL ENCOUNTER (OUTPATIENT)
Dept: ULTRASOUND IMAGING | Age: 85
Discharge: HOME | End: 2020-02-14
Payer: MEDICARE

## 2020-02-14 PROCEDURE — 76881 US COMPL JOINT R-T W/IMG: CPT

## 2020-03-20 NOTE — PROGRESS NOTES
anything worrisome in the left axilla. IMPRESSION: Previous breast cancer                           Possible axillary recurrence    PLAN: I would recommend ultrasound guided biopsy of the left axilla . I have seen, examined and reviewed this patient medication list, appropriate labs and imaging studies. I reviewed relevant medical records and others physicians notes. I discussed the plans of care with the patient. I answered all the questions to the patients satisfaction. I, Dr Risa Estrada, personally performed the services described in this documentation as scribed by Minal Slater MA in my presence and is both accurate and complete. (Please note that portions of this note were completed with a voice recognition program. Efforts were made to edit the dictations but occasionally words are mis-transcribed.)  Over 50% of the total visit time of 30 minutes in face to face encounter with the patient, out of which more than 50% of the time was spent in counseling patient or family and coordination of care. Counseling included but was not limited to time spent reviewing labs, imaging studies/ treatment plan and answering questions.

## 2020-03-23 ENCOUNTER — OFFICE VISIT (OUTPATIENT)
Dept: SURGERY | Age: 85
End: 2020-03-23
Payer: MEDICARE

## 2020-03-23 VITALS
WEIGHT: 143 LBS | SYSTOLIC BLOOD PRESSURE: 128 MMHG | HEART RATE: 80 BPM | HEIGHT: 63 IN | DIASTOLIC BLOOD PRESSURE: 78 MMHG | BODY MASS INDEX: 25.34 KG/M2

## 2020-03-23 PROBLEM — Z85.3 PERSONAL HISTORY OF BREAST CANCER: Status: ACTIVE | Noted: 2020-03-23

## 2020-03-23 PROCEDURE — G8417 CALC BMI ABV UP PARAM F/U: HCPCS | Performed by: SURGERY

## 2020-03-23 PROCEDURE — 1123F ACP DISCUSS/DSCN MKR DOCD: CPT | Performed by: SURGERY

## 2020-03-23 PROCEDURE — 1090F PRES/ABSN URINE INCON ASSESS: CPT | Performed by: SURGERY

## 2020-03-23 PROCEDURE — G8427 DOCREV CUR MEDS BY ELIG CLIN: HCPCS | Performed by: SURGERY

## 2020-03-23 PROCEDURE — 99203 OFFICE O/P NEW LOW 30 MIN: CPT | Performed by: SURGERY

## 2020-03-23 PROCEDURE — 4040F PNEUMOC VAC/ADMIN/RCVD: CPT | Performed by: SURGERY

## 2020-03-23 PROCEDURE — 1036F TOBACCO NON-USER: CPT | Performed by: SURGERY

## 2020-03-23 PROCEDURE — G8484 FLU IMMUNIZE NO ADMIN: HCPCS | Performed by: SURGERY

## 2020-03-23 RX ORDER — LISINOPRIL 20 MG/1
TABLET ORAL
Qty: 180 TABLET | Refills: 0 | Status: SHIPPED | OUTPATIENT
Start: 2020-03-23 | End: 2020-06-22

## 2020-04-15 ENCOUNTER — HOSPITAL ENCOUNTER (OUTPATIENT)
Dept: WOMENS IMAGING | Age: 85
Discharge: HOME | End: 2020-04-15
Payer: MEDICARE

## 2020-04-15 PROCEDURE — 76642 ULTRASOUND BREAST LIMITED: CPT

## 2020-04-17 ENCOUNTER — TELEPHONE (OUTPATIENT)
Dept: SURGERY | Age: 85
End: 2020-04-17

## 2020-04-23 ENCOUNTER — OFFICE VISIT (OUTPATIENT)
Dept: PRIMARY CARE CLINIC | Age: 85
End: 2020-04-23
Payer: MEDICARE

## 2020-04-23 VITALS — OXYGEN SATURATION: 97 % | HEART RATE: 83 BPM | TEMPERATURE: 97.3 F

## 2020-04-23 PROCEDURE — G8417 CALC BMI ABV UP PARAM F/U: HCPCS | Performed by: CLINICAL NURSE SPECIALIST

## 2020-04-23 PROCEDURE — 4040F PNEUMOC VAC/ADMIN/RCVD: CPT | Performed by: CLINICAL NURSE SPECIALIST

## 2020-04-23 PROCEDURE — G8428 CUR MEDS NOT DOCUMENT: HCPCS | Performed by: CLINICAL NURSE SPECIALIST

## 2020-04-23 PROCEDURE — 1123F ACP DISCUSS/DSCN MKR DOCD: CPT | Performed by: CLINICAL NURSE SPECIALIST

## 2020-04-23 PROCEDURE — 1036F TOBACCO NON-USER: CPT | Performed by: CLINICAL NURSE SPECIALIST

## 2020-04-23 PROCEDURE — 99213 OFFICE O/P EST LOW 20 MIN: CPT | Performed by: CLINICAL NURSE SPECIALIST

## 2020-04-23 PROCEDURE — 1090F PRES/ABSN URINE INCON ASSESS: CPT | Performed by: CLINICAL NURSE SPECIALIST

## 2020-04-23 ASSESSMENT — ENCOUNTER SYMPTOMS
WHEEZING: 0
RHINORRHEA: 1
COUGH: 1
FACIAL SWELLING: 0
EYE DISCHARGE: 0
NAUSEA: 0
VOMITING: 0
EYE PAIN: 0
SORE THROAT: 0
SINUS PRESSURE: 0
SHORTNESS OF BREATH: 0
CHEST TIGHTNESS: 0

## 2020-04-23 NOTE — PROGRESS NOTES
CAPS Take 1 tablet by mouth daily      Cholecalciferol (VITAMIN D3) 2000 units TABS Take by mouth      Probiotic Product (PROBIOTIC-10 PO) Take by mouth      NONFORMULARY Take 1 tablet by mouth daily Indications: focus factor      NONFORMULARY       KRILL OIL OMEGA-3 PO Take by mouth daily      docusate sodium (COLACE) 100 MG capsule Take 1 capsule by mouth daily To prevent constipation 20 capsule 0    Acetaminophen (TYLENOL 8 HOUR PO) Take 2 tablets by mouth 3 times daily as needed Indications: Pain       Multiple Vitamin (MULTIVITAMIN PO) Take 1 tablet by mouth daily       DiphenhydrAMINE HCl (BENADRYL ALLERGY PO) Take 1 tablet by mouth daily as needed Indications: Seasonal Allergy        No current facility-administered medications for this visit. Allergies   Allergen Reactions    Demerol Other (See Comments)     Abdominal spasms with severe n/v/    Meperidine Other (See Comments)    Nsaids Other (See Comments)     Dark tarry stools    Stadol [Butorphanol Tartrate]     Sulfa Antibiotics        Health Maintenance   Topic Date Due    DTaP/Tdap/Td vaccine (1 - Tdap) 03/19/1953    Shingles Vaccine (1 of 2) 03/19/1984    Potassium monitoring  03/08/2019    Creatinine monitoring  03/08/2019    Annual Wellness Visit (AWV)  06/07/2020    TSH testing  08/02/2020    Flu vaccine (Season Ended) 09/01/2020    Pneumococcal 65+ years Vaccine  Completed    Hepatitis A vaccine  Aged Out    Hepatitis B vaccine  Aged Out    Hib vaccine  Aged Out    Meningococcal (ACWY) vaccine  Aged Out       Subjective:      Review of Systems   Constitutional: Negative for appetite change, chills, fatigue and fever. HENT: Positive for postnasal drip and rhinorrhea. Negative for congestion, ear discharge, ear pain, facial swelling, hearing loss, sinus pressure and sore throat. Eyes: Negative for pain, discharge and visual disturbance. Respiratory: Positive for cough.  Negative for chest tightness, shortness of

## 2020-05-28 RX ORDER — LEVOTHYROXINE SODIUM 0.05 MG/1
TABLET ORAL
Qty: 90 TABLET | Refills: 3 | Status: SHIPPED | OUTPATIENT
Start: 2020-05-28 | End: 2020-08-11 | Stop reason: SDUPTHER

## 2020-06-11 ENCOUNTER — VIRTUAL VISIT (OUTPATIENT)
Dept: FAMILY MEDICINE CLINIC | Age: 85
End: 2020-06-11

## 2020-06-11 ENCOUNTER — VIRTUAL VISIT (OUTPATIENT)
Dept: FAMILY MEDICINE CLINIC | Age: 85
End: 2020-06-11
Payer: MEDICARE

## 2020-06-11 VITALS — BODY MASS INDEX: 23.74 KG/M2 | HEIGHT: 63 IN | WEIGHT: 134 LBS | TEMPERATURE: 97.8 F

## 2020-06-11 PROCEDURE — G0439 PPPS, SUBSEQ VISIT: HCPCS | Performed by: FAMILY MEDICINE

## 2020-06-11 PROCEDURE — 1123F ACP DISCUSS/DSCN MKR DOCD: CPT | Performed by: FAMILY MEDICINE

## 2020-06-11 PROCEDURE — 99442 PR PHYS/QHP TELEPHONE EVALUATION 11-20 MIN: CPT | Performed by: FAMILY MEDICINE

## 2020-06-11 PROCEDURE — 4040F PNEUMOC VAC/ADMIN/RCVD: CPT | Performed by: FAMILY MEDICINE

## 2020-06-11 ASSESSMENT — PATIENT HEALTH QUESTIONNAIRE - PHQ9
SUM OF ALL RESPONSES TO PHQ QUESTIONS 1-9: 0
SUM OF ALL RESPONSES TO PHQ QUESTIONS 1-9: 0

## 2020-06-11 ASSESSMENT — LIFESTYLE VARIABLES: HOW OFTEN DO YOU HAVE A DRINK CONTAINING ALCOHOL: 0

## 2020-06-11 NOTE — PROGRESS NOTES
involved in providing care):   Patient Care Team:  Mike Ndiaye MD as PCP - General (Family Medicine)  Mike Ndiaye MD as PCP - [facility] Empaneled Provider  ELTON Romero (Family Nurse Practitioner)    Wt Readings from Last 3 Encounters:   20 134 lb (60.8 kg)   20 143 lb (64.9 kg)   20 144 lb (65.3 kg)     Vitals:    20 1432   Temp: 97.8 °F (36.6 °C)   Weight: 134 lb (60.8 kg)   Height: 5' 3\" (1.6 m)           The following problems were reviewed today and where indicated follow up appointments were made and/or referrals ordered. Risk Factor Screenings with Interventions     Fall Risk:  2 or more falls in past year?: no  Fall with injury in past year?: no  Fall Risk Interventions:    · Not indicated     Depression:  PHQ-2 Score: 0  Depression Interventions:  · Not indicated     Anxiety:     Anxiety Interventions:  · Not indicated     Cognitive:     Cognitive Impairment Interventions:  · Not indicated     Substance Abuse:  Social History     Socioeconomic History    Marital status:       Spouse name: Not on file    Number of children: Not on file    Years of education: Not on file    Highest education level: Not on file   Occupational History    Not on file   Social Needs    Financial resource strain: Not on file    Food insecurity     Worry: Not on file     Inability: Not on file    Transportation needs     Medical: Not on file     Non-medical: Not on file   Tobacco Use    Smoking status: Former Smoker     Packs/day: 1.00     Years: 17.00     Pack years: 17.00     Last attempt to quit: 1985     Years since quittin.0    Smokeless tobacco: Never Used   Substance and Sexual Activity    Alcohol use: Yes     Comment: rare    Drug use: No    Sexual activity: Not on file   Lifestyle    Physical activity     Days per week: Not on file     Minutes per session: Not on file    Stress: Not on file   Relationships    Social connections     Talks on phone: Not on file     Gets together: Not on file     Attends Tenriism service: Not on file     Active member of club or organization: Not on file     Attends meetings of clubs or organizations: Not on file     Relationship status: Not on file    Intimate partner violence     Fear of current or ex partner: Not on file     Emotionally abused: Not on file     Physically abused: Not on file     Forced sexual activity: Not on file   Other Topics Concern    Not on file   Social History Narrative    Not on file     Audit Questionnaire: Screen for Alcohol Misuse  How often do you have a drink containing alcohol?: Never  Substance Abuse Interventions:  · Not indicated     Health Risk Assessment:     General  In general, how would you say your health is?: Good  In the past 7 days, have you experienced any of the following? New or Increased Pain, New or Increased Fatigue, Loneliness, Social Isolation, Stress or Anger?: None of These  Do you get the social and emotional support that you need?: Yes  Do you have a Living Will?: (!) No  General Health Risk Interventions:  · Given a handout on how to complete a living will. Directed to the website Visit:  https://[employer].gov/consumer-protection/livingwills for assistance as well. ·     Health Habits/Nutrition  Do you exercise for at least 20 minutes 2-3 times per week?: (!) No  Have you lost any weight without trying in the past 3 months?: No  Do you eat fewer than 2 meals per day?: No  Have you seen a dentist within the past year?: (!) No  Body mass index is 23.74 kg/m². Health Habits/Nutrition Interventions:  Recommended at least 150 minutes of exercise per week and resistance training 2 days a week. Discussed healthy diet habits. Offered suggestions for calorie counting.   ·   ·     Hearing/Vision  Do you or your family notice any trouble with your hearing?: No  Do you have difficulty driving, watching TV, or doing any of your daily activities because of your eyesight?: No  Have you had an eye exam within the past year?: (!) No  Hearing/Vision Interventions:  ·  recommend eye specialist    Safety  Do you have working smoke detectors?: Yes  Have all throw rugs been removed or fastened?: Yes  Do you have non-slip mats or surfaces in all bathtubs/showers?: Yes  Do all of your stairways have a railing or banister?: Yes  Are your doorways, halls and stairs free of clutter?: Yes  Do you always fasten your seatbelt when you are in a car?: Yes  Safety Interventions:  · Not indicated     ADLs  In the past 7 days, did you need help from others to perform any of the following everyday activities? Eating, dressing, grooming, bathing, toileting, or walking/balance?: None  ADL Interventions:  · Not indicated     Personalized Preventive Plan   Current Health Maintenance Status  Immunization History   Administered Date(s) Administered    Pneumococcal Conjugate 13-valent ([id number]) 09/28/2015    Pneumococcal Polysaccharide ([id number]) 09/29/2017        Health Maintenance   Topic Date Due    DTaP/Tdap/Td vaccine (1 - Tdap) 03/19/1953    Shingles Vaccine (1 of 2) 03/19/1984    Annual Wellness Visit (AWV)  05/29/2019    Flu vaccine (Season Ended) 09/01/2020    TSH testing  06/16/2021    Potassium monitoring  06/16/2021    Creatinine monitoring  06/16/2021    Pneumococcal 65+ years Vaccine  Completed    Hepatitis A vaccine  Aged Out    Hepatitis B vaccine  Aged Out    Hib vaccine  Aged Out    Meningococcal (ACWY) vaccine  Aged Out       Recommendations for [employer] Due: see orders.   Recommended screening schedule for the next 5-10 years is provided to the patient in written form: see Patient Instructions/AVS.

## 2020-06-11 NOTE — PATIENT INSTRUCTIONS
Advance Care Planning: Care Instructions  Your Care Instructions    It can be hard to live with an illness that cannot be cured. But if your health is getting worse, you may want to make decisions about end-of-life care. Planning for the end of your life does not mean that you are giving up. It is a way to make sure that your wishes are met. Clearly stating your wishes can make it easier for your loved ones. Making plans while you are still able may also ease your mind and make your final days less stressful and more meaningful. Follow-up care is a key part of your treatment and safety. Be sure to make and go to all appointments, and call your doctor if you are having problems. It's also a good idea to know your test results and keep a list of the medicines you take. What can you do to plan for the end of life? · Visit:  https://ag.[place].gov/consumer-protection/livingwills  · You can bring these issues up with your doctor. You do not need to wait until your doctor starts the conversation. You might start with \"I would not be willing to live with . Zohra Reza \" When you complete this sentence it helps your doctor understand your wishes. · Talk openly and honestly with your doctor. This is the best way to understand the decisions you will need to make as your health changes. Know that you can always change your mind. · Ask your doctor about commonly used life-support measures. These include tube feedings, breathing machines, and fluids given through a vein (IV). Understanding these treatments will help you decide whether you want them. · You may choose to have these life-supporting treatments for a limited time. This allows a trial period to see whether they will help you. You may also decide that you want your doctor to take only certain measures to keep you alive. It is important to spell out these conditions so that your doctor and family understand them. · Talk to your doctor about how long you are likely to live. He or she may be able to give you an idea of what usually happens with your specific illness. · Think about preparing papers that state your wishes. This way there will not be any confusion about what you want. You can change your instructions at any time. Which papers should you prepare? Advance directives are legal papers that tell doctors how you want to be cared for at the end of your life. You do not need a  to write these papers. Ask your doctor or your state health department for information on how to write your advance directives. They may have the forms for each of these types of papers. Make sure your doctor has a copy of these on file, and give a copy to a family member or close friend. · Consider a do-not-resuscitate order (DNR). This order asks that no extra treatments be done if your heart stops or you stop breathing. Extra treatments may include cardiopulmonary resuscitation (CPR), electrical shock to restart your heart, or a machine to breathe for you. If you decide to have a DNR order, ask your doctor to explain and write it. Place the order in your home where everyone can easily see it. · Consider a living will. A living will explains your wishes about life support and other treatments at the end of your life if you become unable to speak for yourself. Living de leon tell doctors to use or not use treatments that would keep you alive. You must have one or two witnesses or a notary present when you sign this form. · Consider a durable power of  for health care. This allows you to name a person to make decisions about your care if you are not able to. Most people ask a close friend or family member. Talk to this person about the kinds of treatments you want and those that you do not want. Make sure this person understands your wishes. These legal papers are simple to change. Tell your doctor what you want to change, and ask him or her to make a note in your medical file.  Give your laws are unclear, your health history is complicated, or your family can't agree on what should be in your living will. · You can change your living will at any time. Some people find that their wishes about end-of-life care change as their health changes. · In addition to making a living will, think about completing a medical power of  form. This form lets you name the person you want to make end-of-life treatment decisions for you (your \"health care agent\") if you're not able to. Many hospitals and nursing homes will give you the forms you need to complete a living will and a medical power of . · Your living will is used only if you can't make or communicate decisions for yourself anymore. If you become able to make decisions again, you can accept or refuse any treatment, no matter what you wrote in your living will. · Your state may offer an online registry. This is a place where you can store your living will online so the doctors and nurses who need to treat you can find it right away. What should you think about when creating a living will? Talk about your end-of-life wishes with your family members and your doctor. Let them know what you want. That way the people making decisions for you won't be surprised by your choices. Think about these questions as you make your living will:  · Do you know enough about life support methods that might be used? If not, talk to your doctor so you know what might be done if you can't breathe on your own, your heart stops, or you're unable to swallow. · What things would you still want to be able to do after you receive life-support methods? Would you want to be able to walk? To speak? To eat on your own? To live without the help of machines? · If you have a choice, where do you want to be cared for? In your home? At a hospital or nursing home? · Do you want certain Restorationism practices performed if you become very ill?   · If you have a choice at the sure to tell your family members and doctors who your health care agent is. Keep your forms in a safe place. But make sure that your loved ones know where the forms are. This could be in your desk where you keep other important papers. Make sure your doctor has a copy of your forms. Where can you learn more? Go to https://chpepiceweb.[employer]. org and sign in to your [employer] account. Enter [id number] in the [place] box to learn more about \"Learning About Durable Power of  for Health Care. \"     If you do not have an account, please click on the \"Sign Up Now\" link. Current as of: 2016  Content Version: 11.5  © 3204-3443 Healthwise, Incorporated. Care instructions adapted under license by [facility] ([facility]). If you have questions about a medical condition or this instruction, always ask your healthcare professional. [address] any warranty or liability for your use of this information. _______________________________________________________________    Home Safety Tips    Each year, thousands of older Americans fall at home. Many of them are seriously injured, and some are disabled. In , more than 8,500 people over age 72  because of falls. Falls are often due to hazards that are easy to overlook but easy to fix. This checklist will help you find and fix those hazards in your home. The checklist asks about hazards found in each room of your home. For each hazard, the checklist tells you how to fix the problem. At the end of the checklist, you will find other tips for preventing falls. o Look at the floor in each room  o When he walks through room do you have to walk around furniture? - Ask someone to move the furniture to clear your past  o You have throw rugs on the floor?   - Remove the rugs or use double-sided tape or nonslip backing on the rugs so they dont slip  o Are papers, magazines, books, shoes, boxes, blankets, towels, or other objects on the floor?  -  things that are on the floor. Always keep objects off the floor  o Do you have to walk over or around cords or wires (like cords from lamps, extension cords, or telephone cords)? - Coil or tape cords and wires next to the wall so you cant trip over them. Have an  put in another outlet. o Are papers, shoes, books, or other objects on the stairs? -  things on the stairs. Always keep objects off the stairs. o Are some steps broken or uneven?   - Fix loose or uneven steps. o Are you missing a light over the stairway?   - Have a  or an  put in an overhead light at the top and bottom of the stairs. o Has the stairway light bulb burned out?   - Have a friend or family member change the light bulb.   o Do you have only one light switch for your stairs (only at the top or at the bottom of the stairs)? - Have a  or an  put in a light switch at the top and bottom of the stairs. You can get light switches that glow  o Are the handrails loose or broken? Is there a handrail on only one side of the stairs? - Fix loose handrails or put in new ones. Make sure handrails are on both sides of the stairs and are as long as the stairs. o Is the carpet on the steps loose or torn? - Make sure the carpet is firmly attached to every step or remove the carpet and attach non-slip rubber treads on the stairs. o Look at your kitchen and eating Look at your kitchen and eating area. Are the things you use often on high shelves? - Move items in your cabinets. Keep things you use often on the lower shelves (about waist high). - Is your step stool unsteady? - Get a new, steady step stool with a bar to hold on to.  Never use a chair as a step stool.   - Is the light near the bed hard to reach?   - Place a lamp close to the bed where it is easy to reach.  o Is the path from your bed to the bathroom dark?   - Put in a night-light so you can see Spiral Flap Text: The defect edges were debeveled with a #15 scalpel blade.  Given the location of the defect, shape of the defect and the proximity to free margins a spiral flap was deemed most appropriate.  Using a sterile surgical marker, an appropriate rotation flap was drawn incorporating the defect and placing the expected incisions within the relaxed skin tension lines where possible. The area thus outlined was incised deep to adipose tissue with a #15 scalpel blade.  The skin margins were undermined to an appropriate distance in all directions utilizing iris scissors.

## 2020-06-11 NOTE — PROGRESS NOTES
 Sulfa Antibiotics        Recent Results (from the past 672 hour(s))   Urinalysis with Microscopic    Collection Time: 06/16/20  9:56 AM   Result Value Ref Range    Color, UA DK YELLOW Straw/Yellow    Clarity, UA Clear Clear    Glucose, Ur Negative Negative mg/dL    Bilirubin Urine Negative Negative    Ketones, Urine TRACE (A) Negative mg/dL    Specific Gravity, UA 1.026 1.005 - 1.030    Blood, Urine Negative Negative    pH, UA 6.0 5.0 - 8.0    Protein, UA Negative Negative mg/dL    Urobilinogen, Urine 0.2 <2.0 E.U./dL    Nitrite, Urine Negative Negative    Leukocyte Esterase, Urine MODERATE (A) Negative    Bacteria, UA NEGATIVE (A) None Seen /HPF    Yeast, UA NEG (A) None Seen /HPF    Hyaline Casts, UA 9 (H) 0 - 8 /HPF    WBC, UA 25 (H) 0 - 5 /HPF    RBC, UA 3 0 - 4 /HPF    Epithelial Cells, UA 1 0 - 5 /HPF   T4, Free    Collection Time: 06/16/20  9:56 AM   Result Value Ref Range    T4 Free 1.16 0.93 - 1.70 ng/dL   TSH without Reflex    Collection Time: 06/16/20  9:56 AM   Result Value Ref Range    TSH 1.260 0.270 - 4.200 uIU/mL   Comprehensive Metabolic Panel    Collection Time: 06/16/20  9:56 AM   Result Value Ref Range    Sodium 140 136 - 145 mmol/L    Potassium 4.3 3.5 - 5.0 mmol/L    Chloride 104 98 - 111 mmol/L    CO2 23 22 - 29 mmol/L    Anion Gap 13 7 - 19 mmol/L    Glucose 97 74 - 109 mg/dL    BUN 18 8 - 23 mg/dL    CREATININE 1.1 (H) 0.5 - 0.9 mg/dL    GFR Non- 47 (A) >60    Calcium 9.5 8.8 - 10.2 mg/dL    Total Protein 6.5 (L) 6.6 - 8.7 g/dL    Alb 4.2 3.5 - 5.2 g/dL    Total Bilirubin <0.2 0.2 - 1.2 mg/dL    Alkaline Phosphatase 83 35 - 104 U/L    ALT 14 5 - 33 U/L    AST 20 5 - 32 U/L   CBC Auto Differential    Collection Time: 06/16/20  9:56 AM   Result Value Ref Range    WBC 8.8 4.8 - 10.8 K/uL    RBC 4.30 4.20 - 5.40 M/uL    Hemoglobin 12.4 12.0 - 16.0 g/dL    Hematocrit 38.7 37.0 - 47.0 %    MCV 90.0 81.0 - 99.0 fL    MCH 28.8 27.0 - 31.0 pg    MCHC 32.0 (L) 33.0 - 37.0 g/dL

## 2020-06-16 DIAGNOSIS — T14.8XXA BRUISING: ICD-10-CM

## 2020-06-16 DIAGNOSIS — E03.9 PRIMARY HYPOTHYROIDISM: ICD-10-CM

## 2020-06-16 LAB
ALBUMIN SERPL-MCNC: 4.2 G/DL (ref 3.5–5.2)
ALP BLD-CCNC: 83 U/L (ref 35–104)
ALT SERPL-CCNC: 14 U/L (ref 5–33)
ANION GAP SERPL CALCULATED.3IONS-SCNC: 13 MMOL/L (ref 7–19)
AST SERPL-CCNC: 20 U/L (ref 5–32)
BACTERIA: NEGATIVE /HPF
BASOPHILS ABSOLUTE: 0.1 K/UL (ref 0–0.2)
BASOPHILS RELATIVE PERCENT: 1.1 % (ref 0–1)
BILIRUB SERPL-MCNC: <0.2 MG/DL (ref 0.2–1.2)
BILIRUBIN URINE: NEGATIVE
BLOOD, URINE: NEGATIVE
BUN BLDV-MCNC: 18 MG/DL (ref 8–23)
CALCIUM SERPL-MCNC: 9.5 MG/DL (ref 8.8–10.2)
CHLORIDE BLD-SCNC: 104 MMOL/L (ref 98–111)
CLARITY: CLEAR
CO2: 23 MMOL/L (ref 22–29)
COLOR: ABNORMAL
CREAT SERPL-MCNC: 1.1 MG/DL (ref 0.5–0.9)
EOSINOPHILS ABSOLUTE: 0.5 K/UL (ref 0–0.6)
EOSINOPHILS RELATIVE PERCENT: 5.2 % (ref 0–5)
EPITHELIAL CELLS, UA: 1 /HPF (ref 0–5)
GFR NON-AFRICAN AMERICAN: 47
GLUCOSE BLD-MCNC: 97 MG/DL (ref 74–109)
GLUCOSE URINE: NEGATIVE MG/DL
HCT VFR BLD CALC: 38.7 % (ref 37–47)
HEMOGLOBIN: 12.4 G/DL (ref 12–16)
HYALINE CASTS: 9 /HPF (ref 0–8)
IMMATURE GRANULOCYTES #: 0.1 K/UL
KETONES, URINE: ABNORMAL MG/DL
LEUKOCYTE ESTERASE, URINE: ABNORMAL
LYMPHOCYTES ABSOLUTE: 2.2 K/UL (ref 1.1–4.5)
LYMPHOCYTES RELATIVE PERCENT: 25.5 % (ref 20–40)
MCH RBC QN AUTO: 28.8 PG (ref 27–31)
MCHC RBC AUTO-ENTMCNC: 32 G/DL (ref 33–37)
MCV RBC AUTO: 90 FL (ref 81–99)
MONOCYTES ABSOLUTE: 0.8 K/UL (ref 0–0.9)
MONOCYTES RELATIVE PERCENT: 8.8 % (ref 0–10)
NEUTROPHILS ABSOLUTE: 5.1 K/UL (ref 1.5–7.5)
NEUTROPHILS RELATIVE PERCENT: 58.6 % (ref 50–65)
NITRITE, URINE: NEGATIVE
PDW BLD-RTO: 16.1 % (ref 11.5–14.5)
PH UA: 6 (ref 5–8)
PLATELET # BLD: 292 K/UL (ref 130–400)
PMV BLD AUTO: 10.3 FL (ref 9.4–12.3)
POTASSIUM SERPL-SCNC: 4.3 MMOL/L (ref 3.5–5)
PROTEIN UA: NEGATIVE MG/DL
RBC # BLD: 4.3 M/UL (ref 4.2–5.4)
RBC UA: 3 /HPF (ref 0–4)
SODIUM BLD-SCNC: 140 MMOL/L (ref 136–145)
SPECIFIC GRAVITY UA: 1.03 (ref 1–1.03)
T4 FREE: 1.16 NG/DL (ref 0.93–1.7)
TOTAL PROTEIN: 6.5 G/DL (ref 6.6–8.7)
TSH SERPL DL<=0.05 MIU/L-ACNC: 1.26 UIU/ML (ref 0.27–4.2)
UROBILINOGEN, URINE: 0.2 E.U./DL
WBC # BLD: 8.8 K/UL (ref 4.8–10.8)
WBC UA: 25 /HPF (ref 0–5)
YEAST: ABNORMAL /HPF

## 2020-06-22 RX ORDER — LISINOPRIL 20 MG/1
TABLET ORAL
Qty: 180 TABLET | Refills: 0 | Status: SHIPPED | OUTPATIENT
Start: 2020-06-22 | End: 2020-09-24

## 2020-07-24 NOTE — PROGRESS NOTES
HISTORY OF PRESENT ILLNESS:    Ms. Concetta Lopez is a 80 y.o. white female who presents with an abnormal Imaging of left axilla. She has a personal history of Left Breast Cancer. She underwent bilateral mastectomy and reconstruction for a left-sided cancer in 1973. She is unsure exactly what she had and says no lymph glands were taken at that time. 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 This was done at [facility].  We will try to track these records down. Her problem now is some peculiar pains in her left axilla that felt like when she had her cancer. She has a family history of breast cancer in her Daughter whom had a bilateral breast Mastectomy. She is post menopausal and is not on HRT.      7/27/2020-Unilateral Left Ultrasound  FINDINGS:    Sonographic evaluation was performed in the left axilla at the area of    prior pain. Normal-appearing tissue is seen without solid or    suspicious mass. A normal-appearing lymph node is present.         Impression    No sonographic abnormality in the left axilla at the area    of prior pain. Clinical follow-up is recommended with additional    imaging evaluation as clinically warranted. Due to patient's history    of mastectomy, conservative six-month follow-up ultrasound could be    considered. BI-RADS Category 2-benign. Signed by Dr Eleonora English on 7/27/2020 3:35 PM      I reviewed the images and real-time with the technologist.  I do not see anything worrisome for malignancy. There is a normal fatty replaced lymph node but no other worrisome findings in the axilla whatsoever. BREAST EXAM:    Jamie Wren has a reconstructed breast on the right with no palpable masses. On the left she has a mastectomy scar with a lot of redundant skin but no palpable masses.   I do not palpate anything worrisome in the left axilla. IMPRESSION: Previous breast cancer                          Doubt axillary recurrence    PLAN: Unilateral Left Breast Ultrasound with a physical exam in 6 months. She will call with any concerns. I have seen, examined and reviewed this patient medication list, appropriate labs and imaging studies. I reviewed relevant medical records and others physicians notes. I discussed the plans of care with the patient. I answered all the questions to the patients satisfaction. I, Dr Shane Mckeon, personally performed the services described in this documentation as scribed by Nadir Chaudhari MA in my presence and is both accurate and complete. (Please note that portions of this note were completed with a voice recognition program. Efforts were made to edit the dictations but occasionally words are mis-transcribed.)  Over 50% of the total visit time of 20 minutes in face to face encounter with the patient, out of which more than 50% of the time was spent in counseling patient or family and coordination of care. Counseling included but was not limited to time spent reviewing labs, imaging studies/ treatment plan and answering questions.

## 2020-07-27 ENCOUNTER — OFFICE VISIT (OUTPATIENT)
Dept: SURGERY | Age: 85
End: 2020-07-27
Payer: MEDICARE

## 2020-07-27 ENCOUNTER — HOSPITAL ENCOUNTER (OUTPATIENT)
Dept: WOMENS IMAGING | Age: 85
Discharge: HOME | End: 2020-07-27
Payer: MEDICARE

## 2020-07-27 PROCEDURE — 1036F TOBACCO NON-USER: CPT | Performed by: SURGERY

## 2020-07-27 PROCEDURE — 76642 ULTRASOUND BREAST LIMITED: CPT

## 2020-07-27 PROCEDURE — 1090F PRES/ABSN URINE INCON ASSESS: CPT | Performed by: SURGERY

## 2020-07-27 PROCEDURE — 1123F ACP DISCUSS/DSCN MKR DOCD: CPT | Performed by: SURGERY

## 2020-07-27 PROCEDURE — G8420 CALC BMI NORM PARAMETERS: HCPCS | Performed by: SURGERY

## 2020-07-27 PROCEDURE — 99213 OFFICE O/P EST LOW 20 MIN: CPT | Performed by: SURGERY

## 2020-07-27 PROCEDURE — 4040F PNEUMOC VAC/ADMIN/RCVD: CPT | Performed by: SURGERY

## 2020-07-27 PROCEDURE — G8428 CUR MEDS NOT DOCUMENT: HCPCS | Performed by: SURGERY

## 2020-08-11 ENCOUNTER — OFFICE VISIT (OUTPATIENT)
Dept: FAMILY MEDICINE CLINIC | Age: 85
End: 2020-08-11
Payer: MEDICARE

## 2020-08-11 VITALS
SYSTOLIC BLOOD PRESSURE: 158 MMHG | OXYGEN SATURATION: 96 % | HEIGHT: 63 IN | WEIGHT: 142 LBS | TEMPERATURE: 97.6 F | BODY MASS INDEX: 25.16 KG/M2 | DIASTOLIC BLOOD PRESSURE: 84 MMHG | RESPIRATION RATE: 18 BRPM | HEART RATE: 65 BPM

## 2020-08-11 DIAGNOSIS — R53.83 FATIGUE, UNSPECIFIED TYPE: ICD-10-CM

## 2020-08-11 DIAGNOSIS — R00.2 PALPITATIONS: ICD-10-CM

## 2020-08-11 DIAGNOSIS — E03.8 HYPOTHYROIDISM DUE TO HASHIMOTO'S THYROIDITIS: ICD-10-CM

## 2020-08-11 DIAGNOSIS — E06.3 HYPOTHYROIDISM DUE TO HASHIMOTO'S THYROIDITIS: ICD-10-CM

## 2020-08-11 DIAGNOSIS — D50.9 IRON DEFICIENCY ANEMIA, UNSPECIFIED IRON DEFICIENCY ANEMIA TYPE: ICD-10-CM

## 2020-08-11 LAB
ALBUMIN SERPL-MCNC: 4.2 G/DL (ref 3.5–5.2)
ALP BLD-CCNC: 84 U/L (ref 35–104)
ALT SERPL-CCNC: 12 U/L (ref 5–33)
ANION GAP SERPL CALCULATED.3IONS-SCNC: 11 MMOL/L (ref 7–19)
AST SERPL-CCNC: 19 U/L (ref 5–32)
BASOPHILS ABSOLUTE: 0.1 K/UL (ref 0–0.2)
BASOPHILS RELATIVE PERCENT: 0.8 % (ref 0–1)
BILIRUB SERPL-MCNC: <0.2 MG/DL (ref 0.2–1.2)
BILIRUBIN URINE: NEGATIVE
BLOOD, URINE: NEGATIVE
BUN BLDV-MCNC: 18 MG/DL (ref 8–23)
CALCIUM SERPL-MCNC: 10.3 MG/DL (ref 8.8–10.2)
CHLORIDE BLD-SCNC: 105 MMOL/L (ref 98–111)
CLARITY: CLEAR
CO2: 26 MMOL/L (ref 22–29)
COLOR: NORMAL
CREAT SERPL-MCNC: 1.1 MG/DL (ref 0.5–0.9)
EOSINOPHILS ABSOLUTE: 0.4 K/UL (ref 0–0.6)
EOSINOPHILS RELATIVE PERCENT: 4.5 % (ref 0–5)
FERRITIN: 35.3 NG/ML (ref 13–150)
GFR AFRICAN AMERICAN: 57
GFR NON-AFRICAN AMERICAN: 47
GLUCOSE BLD-MCNC: 103 MG/DL (ref 74–109)
GLUCOSE URINE: NEGATIVE MG/DL
HCT VFR BLD CALC: 40.3 % (ref 37–47)
HEMOGLOBIN: 12.7 G/DL (ref 12–16)
IMMATURE GRANULOCYTES #: 0.1 K/UL
IRON: 98 UG/DL (ref 37–145)
KETONES, URINE: NEGATIVE MG/DL
LEUKOCYTE ESTERASE, URINE: NEGATIVE
LYMPHOCYTES ABSOLUTE: 2.4 K/UL (ref 1.1–4.5)
LYMPHOCYTES RELATIVE PERCENT: 27.3 % (ref 20–40)
MAGNESIUM: 2.3 MG/DL (ref 1.6–2.4)
MCH RBC QN AUTO: 28.6 PG (ref 27–31)
MCHC RBC AUTO-ENTMCNC: 31.5 G/DL (ref 33–37)
MCV RBC AUTO: 90.8 FL (ref 81–99)
MONOCYTES ABSOLUTE: 0.9 K/UL (ref 0–0.9)
MONOCYTES RELATIVE PERCENT: 10.7 % (ref 0–10)
NEUTROPHILS ABSOLUTE: 4.9 K/UL (ref 1.5–7.5)
NEUTROPHILS RELATIVE PERCENT: 56.1 % (ref 50–65)
NITRITE, URINE: NEGATIVE
PDW BLD-RTO: 15.6 % (ref 11.5–14.5)
PH UA: 7 (ref 5–8)
PLATELET # BLD: 305 K/UL (ref 130–400)
PMV BLD AUTO: 10.7 FL (ref 9.4–12.3)
POTASSIUM SERPL-SCNC: 4.4 MMOL/L (ref 3.5–5)
PROTEIN UA: NEGATIVE MG/DL
RBC # BLD: 4.44 M/UL (ref 4.2–5.4)
SODIUM BLD-SCNC: 142 MMOL/L (ref 136–145)
SPECIFIC GRAVITY UA: 1.02 (ref 1–1.03)
T4 FREE: 1.36 NG/DL (ref 0.93–1.7)
TOTAL PROTEIN: 6.5 G/DL (ref 6.6–8.7)
TSH SERPL DL<=0.05 MIU/L-ACNC: 1.2 UIU/ML (ref 0.27–4.2)
UROBILINOGEN, URINE: 0.2 E.U./DL
VITAMIN B-12: 830 PG/ML (ref 211–946)
WBC # BLD: 8.7 K/UL (ref 4.8–10.8)

## 2020-08-11 PROCEDURE — 1036F TOBACCO NON-USER: CPT | Performed by: NURSE PRACTITIONER

## 2020-08-11 PROCEDURE — 93000 ELECTROCARDIOGRAM COMPLETE: CPT | Performed by: NURSE PRACTITIONER

## 2020-08-11 PROCEDURE — 4040F PNEUMOC VAC/ADMIN/RCVD: CPT | Performed by: NURSE PRACTITIONER

## 2020-08-11 PROCEDURE — 99214 OFFICE O/P EST MOD 30 MIN: CPT | Performed by: NURSE PRACTITIONER

## 2020-08-11 PROCEDURE — G8427 DOCREV CUR MEDS BY ELIG CLIN: HCPCS | Performed by: NURSE PRACTITIONER

## 2020-08-11 PROCEDURE — 1090F PRES/ABSN URINE INCON ASSESS: CPT | Performed by: NURSE PRACTITIONER

## 2020-08-11 PROCEDURE — G8417 CALC BMI ABV UP PARAM F/U: HCPCS | Performed by: NURSE PRACTITIONER

## 2020-08-11 PROCEDURE — 1123F ACP DISCUSS/DSCN MKR DOCD: CPT | Performed by: NURSE PRACTITIONER

## 2020-08-11 RX ORDER — LEVOTHYROXINE SODIUM 0.05 MG/1
50 TABLET ORAL DAILY
Qty: 90 TABLET | Refills: 3 | Status: SHIPPED | OUTPATIENT
Start: 2020-08-11 | End: 2020-09-18 | Stop reason: ALTCHOICE

## 2020-08-11 ASSESSMENT — ENCOUNTER SYMPTOMS
SORE THROAT: 0
DIARRHEA: 0
SHORTNESS OF BREATH: 1
COUGH: 0
ABDOMINAL PAIN: 0
WHEEZING: 0
VOMITING: 0
CHEST TIGHTNESS: 0
NAUSEA: 0

## 2020-08-11 NOTE — PROGRESS NOTES
Som Saleem is a 80 y.o. female who presents today for  Chief Complaint   Patient presents with    Fatigue     started about 3 weeks ago     Irregular Heart Beat    Discuss Medications     Levothyroxine pharmacy filled different med        HPI:  She has had increased fatigue for the past several weeks, worse for 2 to 3 weeks. She has had intermittent palpitations for the past couple weeks. Heart feels out of rhythm at times. Mild chest pressure but no significant pain. Occasional sob with palpitations, nothing significant. Hypothyroidism  Symptoms are currently well controlled. No temperature intolerance, mood issues, or fatigue reported. Tolerating current medication without adverse effects. She is concerned about thyroid medication. States she received a different brand with recent fill. Typically levothyroxine. Current bottle also states Euthyrox. She is concerned that this has contributed to her recent palpitations as it correlates with the time frame. Hypertension  Compliant with medications. No adverse effects from medication. No lightheadedness, palpitations, or chest pain. BP slightly elevated today, tends to have white coat syndrome. States it has been stable otherwise. Has history of iron deficiency anemia. Hemoglobin was 12.4, stable in June. Review of Systems   Constitutional: Positive for fatigue. Negative for chills and fever. HENT: Negative for congestion, ear pain and sore throat. Respiratory: Positive for shortness of breath (minimal with palpitations occ). Negative for cough, chest tightness and wheezing. Cardiovascular: Positive for palpitations. Negative for chest pain. Gastrointestinal: Negative for abdominal pain, diarrhea, nausea and vomiting. Genitourinary: Negative for dysuria and frequency. Musculoskeletal: Negative for arthralgias and myalgias. Skin: Negative for rash.        Past Medical History:   Diagnosis Date    Breast cyst     Cancer [facility])     surgery only    Colon polyp     Family history of colon cancer 6/17/2016    GERD (gastroesophageal reflux disease)     History of double vision     with surgical correction    Hypertension     Thyroid disease        Current Outpatient Medications   Medication Sig Dispense Refill    lisinopril (PRINIVIL;ZESTRIL) 20 MG tablet Take 2 tablets by mouth once daily 180 tablet 0    metoprolol tartrate (LOPRESSOR) 25 MG tablet TAKE 1 TABLET BY MOUTH TWICE DAILY 180 tablet 3    Green Tea, Deepika sinensis, (GREEN TEA PO) Take 400 mg by mouth daily as needed      Coenzyme Q10 (COQ10) 200 MG CAPS Take 1 tablet by mouth daily      Cholecalciferol (VITAMIN D3) 2000 units TABS Take by mouth      NONFORMULARY Take 1 tablet by mouth daily Indications: focus factor      NONFORMULARY       KRILL OIL OMEGA-3 PO Take by mouth daily      docusate sodium (COLACE) 100 MG capsule Take 1 capsule by mouth daily To prevent constipation 20 capsule 0    Acetaminophen (TYLENOL 8 HOUR PO) Take 2 tablets by mouth 3 times daily as needed Indications: Pain       Multiple Vitamin (MULTIVITAMIN PO) Take 1 tablet by mouth daily       DiphenhydrAMINE HCl (BENADRYL ALLERGY PO) Take 1 tablet by mouth daily as needed Indications: Seasonal Allergy       levothyroxine (SYNTHROID) 50 MCG tablet TAKE 1 TABLET BY MOUTH ONCE DAILY FOR  UNDERACTIVE  THYROID (Patient not taking: Reported on 8/11/2020) 90 tablet 3    triamterene-hydrochlorothiazide (MAXZIDE-25) 37.5-25 MG per tablet Take 1 tablet by mouth daily as needed      TURMERIC PO Take 1,000 mg by mouth daily      Probiotic Product (PROBIOTIC-10 PO) Take by mouth       No current facility-administered medications for this visit.         Allergies   Allergen Reactions    Demerol Other (See Comments)     Abdominal spasms with severe n/v/    Meperidine Other (See Comments)    Nsaids Other (See Comments)     Dark tarry stools    Stadol [Butorphanol Tartrate]     Sulfa Antibiotics        Past Surgical History:   Procedure Laterality Date    ANKLE FRACTURE SURGERY Right     BACK SURGERY      plating    BREAST RECONSTRUCTION      césar. mastectomy with replacement    COLONOSCOPY  2007    Dr. Roxann Mathis (LOWER) N/A 2016    Dr Laina Su non-obstructing simple appearing pancreatic cyst of 2.2 x 1.9mm-no high risk features were seen    EYE SURGERY      recent    HYSTERECTOMY      [place] TOTAL KNEE ARTHROPLASTY Left 10/17/2017    KNEE TOTAL ARTHROPLASTY performed by Kira Hastings MD at [id number]       UPPER GASTROINTESTINAL ENDOSCOPY N/A 2016    Dr NNEKA Bhatia-gastritis/gastropathy       Social History     Tobacco Use    Smoking status: Former Smoker     Packs/day: 1.00     Years: 17.00     Pack years: 17.00     Last attempt to quit: 1985     Years since quittin.2    Smokeless tobacco: Never Used   Substance Use Topics    Alcohol use: Yes     Comment: rare    Drug use: No       Family History   Problem Relation Age of Onset    Colon Cancer Mother     Colon Polyps Mother     Rectal Cancer Mother 80    Breast Cancer Daughter 64        Had bilateral mastectomy    Esophageal Cancer Neg Hx     Liver Disease Neg Hx     Lung Cancer Neg Hx     Stomach Cancer Neg Hx        BP (!) 158/84   Pulse 65   Temp 97.6 °F (36.4 °C) (Temporal)   Resp 18   Ht 5' 3\" (1.6 m)   Wt 142 lb (64.4 kg)   SpO2 96%   BMI 25.15 kg/m²     Physical Exam  Vitals signs reviewed. Constitutional:       General: She is not in acute distress. Appearance: Normal appearance. She is well-developed. HENT:      Head: Normocephalic. Nose: Nose normal.      Mouth/Throat:      Mouth: Mucous membranes are moist.      Pharynx: No oropharyngeal exudate or posterior oropharyngeal erythema.    Eyes:      Conjunctiva/sclera: Conjunctivae normal.      Pupils: Pupils are equal, round, and scheduled. Alaina Yoon was seen today for fatigue, irregular heart beat and discuss medications. Diagnoses and all orders for this visit:    Palpitations  -     EKG 12 Lead; Future  -     Magnesium; Future  -     EKG 12 Lead    Fatigue, unspecified type  -     CBC Auto Differential; Future  -     Comprehensive Metabolic Panel; Future  -     Iron; Future  -     Ferritin; Future  -     Vitamin B12; Future  -     Urinalysis Reflex to Culture; Future    Essential (primary) hypertension    Iron deficiency anemia, unspecified iron deficiency anemia type  -     Iron; Future  -     Ferritin; Future    Hypothyroidism due to Hashimoto's thyroiditis  -     TSH without Reflex; Future  -     T4, Free; Future      There are no discontinued medications. There are no Patient Instructions on file for this visit. Patient voicesunderstanding and agrees to plans along with risks and benefits of plan. Counseling:  Yesenia Randolph's case, medications and options were discussed in detail. Patient was instructed to call the office if she questionsregarding her treatment. Should her conditions worsen, she should return to office to be reassessed by ELTON Junior. she Should to go the closest Emergency Department for any emergency. They verbalizedunderstanding the above instructions. Return for as scheduled.

## 2020-08-12 ENCOUNTER — HOSPITAL ENCOUNTER (OUTPATIENT)
Dept: NON INVASIVE DIAGNOSTICS | Age: 85
Discharge: HOME | End: 2020-08-12
Payer: MEDICARE

## 2020-08-12 PROCEDURE — 93229 REMOTE 30 DAY ECG TECH SUPP: CPT

## 2020-09-09 ENCOUNTER — TELEPHONE (OUTPATIENT)
Dept: FAMILY MEDICINE CLINIC | Age: 85
End: 2020-09-09

## 2020-09-09 NOTE — TELEPHONE ENCOUNTER
Patient called stating she was \"returning a call from Ida\". I explained to her that the last time I had spoke to her was on 8/12/2020. Reminded the patient that she did have labs due in a few days. She thanked this LPN for calling her back.

## 2020-09-10 DIAGNOSIS — E83.52 HYPERCALCEMIA: ICD-10-CM

## 2020-09-10 LAB
ANION GAP SERPL CALCULATED.3IONS-SCNC: 15 MMOL/L (ref 7–19)
BUN BLDV-MCNC: 29 MG/DL (ref 8–23)
CALCIUM SERPL-MCNC: 10.2 MG/DL (ref 8.8–10.2)
CHLORIDE BLD-SCNC: 102 MMOL/L (ref 98–111)
CO2: 24 MMOL/L (ref 22–29)
CREAT SERPL-MCNC: 1.2 MG/DL (ref 0.5–0.9)
GFR AFRICAN AMERICAN: 51
GFR NON-AFRICAN AMERICAN: 43
GLUCOSE BLD-MCNC: 103 MG/DL (ref 74–109)
PARATHYROID HORMONE INTACT: 57.4 PG/ML (ref 15–65)
POTASSIUM SERPL-SCNC: 5 MMOL/L (ref 3.5–5)
SODIUM BLD-SCNC: 141 MMOL/L (ref 136–145)
VITAMIN D 25-HYDROXY: 68.3 NG/ML

## 2020-09-11 ENCOUNTER — NURSE TRIAGE (OUTPATIENT)
Dept: OTHER | Facility: CLINIC | Age: 85
End: 2020-09-11

## 2020-09-11 NOTE — TELEPHONE ENCOUNTER
Reason for Disposition   Tiny bruises (spots or dots) of unknown cause   [1] Purple or blood-colored LOCALIZED rash AND [2] no fever AND [3] sounds well to triager  (Exception: bruise from injury or friction)    Answer Assessment - Initial Assessment Questions  1. APPEARANCE of INJURY: \"What does the injury look like? \"       They come up and look a purplish red, like blood seeping under the skin and then when it goes away it looks brownish     2. SIZE: \"How large is the cut?\"         Denies cuts- states that are 1/4 inch and some are bigger     3. BLEEDING: \"Is it bleeding now? \" If so, ask: \"Is it difficult to stop? \"         No bleeding outside of skin, stays under     4. LOCATION: \"Where is the injury located? \"         Pt never knows, they just pop up     5. ONSET: \"How long ago did the injury occur? \"          It started several weeks ago     6. MECHANISM: \"Tell me how it happened. \"          Denies any hitting or bumping of body to cause areas     7. TETANUS: \"When was the last tetanus booster?\"        8. PREGNANCY: \"Is there any chance you are pregnant? \" \"When was your last menstrual period? \"      NA    Answer Assessment - Initial Assessment Questions  1. APPEARANCE of BRUISE: \"Describe the bruise. \"       Dark red/purple areas all over     2. SIZE: \"How large is the bruise?\"         1/4 inch to bigger     3. NUMBER: \"How many bruises are there?\"         2 on hand and arm last week but has seen more     4. LOCATION: \"Where is the bruise located?\"          5. ONSET: \"How long ago did the bruise occur? \"          Several weeks ago     6. CAUSE: \"Tell me how it happened. \"          Pt does not know, states they are not from injury or bumping and that they just pop up     7. MEDICAL HISTORY: \"Do you have any medical problems that can cause easy bruising or bleeding? \" (e.g., leukemia, liver disease, recent chemotherapy)          Pt had breast CA many years ago and 23 lymph nodes removed many years ago   6. MEDICATIONS : \"Do you take any medications which thin the blood such as: aspirin, heparin, ibuprofen (NSAIDS), Plavix, or Coumadin?\"        9. OTHER SYMPTOMS: \"Do you have any other symptoms? \"  (e.g., weakness, dizziness, pain, fever, nosebleed, blood in urine/stool)      Weakness, run down    10. PREGNANCY: \"Is there any chance you are pregnant? \" \"When was your last menstrual period? \"        NA    Protocols used: BRUISES-ADULT-AH, RASH - PURPLE SPOTS OR DOTS-ADULT-AH, SKIN INJURY-ADULT-OH    Patient called [address] [place] [address] pre-service center [facility]) to schedule appointment, with red flag complaint, transferred to RN access for triage. Caller reports symptoms as documented above. Caller informed of disposition. Soft transfer to PCP office to speak about dispo. Pt transferred to [place], RN to speak to pt about getting seen per dispo. Care advice as documented. Pt verbalized understanding and is agreeable to plan. Please do not respond to the triage nurse through this encounter. Any subsequent communication should be directly with the patient.

## 2020-09-14 ENCOUNTER — OFFICE VISIT (OUTPATIENT)
Dept: FAMILY MEDICINE CLINIC | Age: 85
End: 2020-09-14
Payer: MEDICARE

## 2020-09-14 VITALS
BODY MASS INDEX: 25.97 KG/M2 | HEART RATE: 89 BPM | DIASTOLIC BLOOD PRESSURE: 88 MMHG | OXYGEN SATURATION: 99 % | WEIGHT: 146.6 LBS | SYSTOLIC BLOOD PRESSURE: 138 MMHG | TEMPERATURE: 97 F

## 2020-09-14 PROCEDURE — 1036F TOBACCO NON-USER: CPT | Performed by: FAMILY MEDICINE

## 2020-09-14 PROCEDURE — 1090F PRES/ABSN URINE INCON ASSESS: CPT | Performed by: FAMILY MEDICINE

## 2020-09-14 PROCEDURE — 99214 OFFICE O/P EST MOD 30 MIN: CPT | Performed by: FAMILY MEDICINE

## 2020-09-14 PROCEDURE — G8427 DOCREV CUR MEDS BY ELIG CLIN: HCPCS | Performed by: FAMILY MEDICINE

## 2020-09-14 PROCEDURE — 1123F ACP DISCUSS/DSCN MKR DOCD: CPT | Performed by: FAMILY MEDICINE

## 2020-09-14 PROCEDURE — G8417 CALC BMI ABV UP PARAM F/U: HCPCS | Performed by: FAMILY MEDICINE

## 2020-09-14 PROCEDURE — 4040F PNEUMOC VAC/ADMIN/RCVD: CPT | Performed by: FAMILY MEDICINE

## 2020-09-14 RX ORDER — LEVOTHYROXINE SODIUM 50 MCG
50 TABLET ORAL DAILY
Qty: 30 TABLET | Refills: 3 | Status: SHIPPED | OUTPATIENT
Start: 2020-09-14 | End: 2021-11-08

## 2020-09-14 RX ORDER — METOPROLOL TARTRATE 50 MG/1
TABLET, FILM COATED ORAL
Qty: 60 TABLET | Refills: 5 | Status: SHIPPED | OUTPATIENT
Start: 2020-09-14 | End: 2021-04-18

## 2020-09-14 NOTE — PROGRESS NOTES
[facility] PHYSICIAN SERVICES  [facility] FAMILY MEDICINE  [address] [address] [place] [id number]  Dept: [phone]  Dept Fax: [phone]  Loc: [phone]    Emmy Chase is a 80 y.o. female who presents today for her medical conditions/complaints as noted below. Emmy Chase is here for Palpitations (recent holter monitor) and Skin Problem        HPI:   CC: Here today to discuss the following:    She is continued to have palpitations. She is had no lightheadedness or dizziness. No chest pain or shortness of breath. She had a continuous cardiac monitor that she wore in August which showed sinus rhythm with a minimal heart rate of 49 and a maximum of 137. She had one episode of ventricular tachycardia. She had no pauses. She did, however, have frequent PVCs which were also observed on her EKG. Hypothyroidism  Symptoms are stable. No temperature intolerance, fatigue, or mood disturbance from baseline. Complaint with current medication. Hypertension  Compliant with medications. No adverse effects from medication. No lightheadedness, palpitations, or chest pain.         HPI    Past Medical History:   Diagnosis Date    Breast cyst     Cancer ([address]     surgery only    Colon polyp     Family history of colon cancer 6/17/2016    GERD (gastroesophageal reflux disease)     History of double vision     with surgical correction    Hypertension     Thyroid disease       Past Surgical History:   Procedure Laterality Date    ANKLE FRACTURE SURGERY Right     BACK SURGERY      plating    BREAST RECONSTRUCTION      césar. mastectomy with replacement    COLONOSCOPY  04/19/2007    Dr. Rita Philip (LOWER) N/A 5/25/2016    Dr Danish Donnelly non-obstructing simple appearing pancreatic cyst of 2.2 x 1.9mm-no high risk features were seen    EYE SURGERY      recent    HYSTERECTOMY      [place] TOTAL KNEE ARTHROPLASTY Left 10/17/2017    KNEE TOTAL ARTHROPLASTY performed tablet by mouth daily       DiphenhydrAMINE HCl (BENADRYL ALLERGY PO) Take 1 tablet by mouth daily as needed Indications: Seasonal Allergy        No current facility-administered medications for this visit. Allergies   Allergen Reactions    Demerol Other (See Comments)     Abdominal spasms with severe n/v/    Meperidine Other (See Comments)    Nsaids Other (See Comments)     Dark tarry stools    Stadol [Butorphanol Tartrate]     Sulfa Antibiotics        Health Maintenance   Topic Date Due    DTaP/Tdap/Td vaccine (1 - Tdap) 09/14/2021 (Originally 3/19/1953)    Flu vaccine (1) 09/14/2021 (Originally 9/1/2020)    Shingles Vaccine (1 of 2) 09/14/2021 (Originally 3/19/1984)    Annual Wellness Visit (AWV)  06/12/2021    TSH testing  08/11/2021    Potassium monitoring  09/10/2021    Creatinine monitoring  09/10/2021    Pneumococcal 65+ years Vaccine  Completed    Hepatitis A vaccine  Aged Out    Hepatitis B vaccine  Aged Out    Hib vaccine  Aged Out    Meningococcal (ACWY) vaccine  Aged Out       Subjective:      Review of Systems   Constitutional: Negative for chills and fever. HENT: Negative for congestion. Respiratory: Negative for cough, chest tightness and shortness of breath. Cardiovascular: Positive for palpitations. Negative for chest pain and leg swelling. Gastrointestinal: Negative for abdominal pain, anal bleeding, constipation, diarrhea and nausea. Genitourinary: Negative for difficulty urinating. Psychiatric/Behavioral: Negative. SeeHPI for visit specific review of symptoms. All others negative      Objective:   /88   Pulse 89   Temp 97 °F (36.1 °C)   Wt 146 lb 9.6 oz (66.5 kg)   SpO2 99%   BMI 25.97 kg/m²   Physical Exam  Physical Exam   Constitutional: She appears well-developed. Does not appear ill. Eyes: Pupils are equal, round, and reactive to light. Conjunctiva and Lids normal.  Neck: Normal range of motion. Neck supple. No masses.   Neck Symmetric. Normal tracheal position. No thyroid enlargement  Cardiovascular: Normal rate and regular rhythm. Exam reveals no friction rub. Carotid arteries: no bruit observed. No murmur heard. Respiratory:  Effort normal and breath sounds normal. No respiratory distress. No wheezes. No rales. No use of accessory muscles or intercostal retractions. Abdominal: Soft. Bowel sounds are normal. exhibits no distension. There is no tenderness. There is no rebound and no guarding. Musculoskeletal: exhibits no edema. Normal gait. Neurological: alert. Psychiatric: normal mood and affect. Her behavior is normal. Normal judgement and insight observed. Recent Results (from the past 672 hour(s))   Basic Metabolic Panel    Collection Time: 09/10/20 11:31 AM   Result Value Ref Range    Sodium 141 136 - 145 mmol/L    Potassium 5.0 3.5 - 5.0 mmol/L    Chloride 102 98 - 111 mmol/L    CO2 24 22 - 29 mmol/L    Anion Gap 15 7 - 19 mmol/L    Glucose 103 74 - 109 mg/dL    BUN 29 (H) 8 - 23 mg/dL    CREATININE 1.2 (H) 0.5 - 0.9 mg/dL    GFR Non- 43 (A) >60    GFR  51 (L) >59    Calcium 10.2 8.8 - 10.2 mg/dL   Vitamin D 25 Hydroxy    Collection Time: 09/10/20 11:31 AM   Result Value Ref Range    Vit D, 25-Hydroxy 68.3 >=30 ng/mL   PTH, Intact    Collection Time: 09/10/20 11:31 AM   Result Value Ref Range    PTH 57.4 15.0 - 65.0 pg/mL               Assessment & Plan: The following diagnoses and conditions are stable with no further orders unless indicated:  1. Palpitations  Will obtain a 2D echocardiogram to further evaluate. May need referral to cardiology advised to go to the emergency department if she is developing shortness of breath or chest pain  - ECHO Complete 2D W Doppler W Color; Future    2. Recurrent major depressive disorder, in full remission ([address]  Stable    3.  Primary hypothyroidism  Lab Results   Component Value Date    TSH 1.200 08/11/2020    T4FREE 1.36 08/11/2020

## 2020-09-15 NOTE — PROCEDURES
Cardiac event monitor report    Dates of recording 8/12/2020 through 8/26/2020    Summary impressions:    1. Sinus rhythm minimal heart rate 49 average rate 74 maximal rate 137    2. 1 episode ventricular tachycardia recorded 8 beats maximal rate 139 average rate 117    3. 20 episode supraventricular tachycardia recorded longest 9.3 seconds maximal rate 184    4. No pauses greater than 3.0 seconds were recorded    5. No episodes of complete or Mobitz 2 atrioventricular block were recorded    6. No episodes of atrial fibrillation were recorded    7. No diary or triggered events were recorded    8.  Frequent PVCs otherwise rare ectopy noted occasional bigeminy and trigeminy noted

## 2020-09-18 ASSESSMENT — ENCOUNTER SYMPTOMS
ABDOMINAL PAIN: 0
ANAL BLEEDING: 0
SHORTNESS OF BREATH: 0
CONSTIPATION: 0
DIARRHEA: 0
COUGH: 0
NAUSEA: 0
CHEST TIGHTNESS: 0

## 2020-09-21 ENCOUNTER — TELEPHONE (OUTPATIENT)
Dept: FAMILY MEDICINE CLINIC | Age: 85
End: 2020-09-21

## 2020-09-21 NOTE — TELEPHONE ENCOUNTER
Chandra Lewis requests that shelbi return their call. The best time to reach her is Anytime. Thank you.

## 2020-09-24 RX ORDER — LISINOPRIL 20 MG/1
TABLET ORAL
Qty: 180 TABLET | Refills: 0 | Status: SHIPPED | OUTPATIENT
Start: 2020-09-24 | End: 2020-12-23

## 2020-10-23 ENCOUNTER — HOSPITAL ENCOUNTER (OUTPATIENT)
Dept: NON INVASIVE DIAGNOSTICS | Age: 85
Discharge: HOME | End: 2020-10-23
Payer: MEDICARE

## 2020-10-23 LAB
LV EF: 58 %
LVEF MODALITY: NORMAL

## 2020-10-23 PROCEDURE — 93306 TTE W/DOPPLER COMPLETE: CPT

## 2020-11-09 ENCOUNTER — TELEPHONE (OUTPATIENT)
Dept: FAMILY MEDICINE CLINIC | Age: 85
End: 2020-11-09

## 2020-11-16 ENCOUNTER — OFFICE VISIT (OUTPATIENT)
Dept: FAMILY MEDICINE CLINIC | Age: 85
End: 2020-11-16
Payer: MEDICARE

## 2020-11-16 VITALS
OXYGEN SATURATION: 95 % | BODY MASS INDEX: 26.22 KG/M2 | HEART RATE: 83 BPM | WEIGHT: 148 LBS | DIASTOLIC BLOOD PRESSURE: 78 MMHG | TEMPERATURE: 97.5 F | SYSTOLIC BLOOD PRESSURE: 132 MMHG

## 2020-11-16 PROCEDURE — 1123F ACP DISCUSS/DSCN MKR DOCD: CPT | Performed by: FAMILY MEDICINE

## 2020-11-16 PROCEDURE — 1090F PRES/ABSN URINE INCON ASSESS: CPT | Performed by: FAMILY MEDICINE

## 2020-11-16 PROCEDURE — G8484 FLU IMMUNIZE NO ADMIN: HCPCS | Performed by: FAMILY MEDICINE

## 2020-11-16 PROCEDURE — 1036F TOBACCO NON-USER: CPT | Performed by: FAMILY MEDICINE

## 2020-11-16 PROCEDURE — G8417 CALC BMI ABV UP PARAM F/U: HCPCS | Performed by: FAMILY MEDICINE

## 2020-11-16 PROCEDURE — G8427 DOCREV CUR MEDS BY ELIG CLIN: HCPCS | Performed by: FAMILY MEDICINE

## 2020-11-16 PROCEDURE — 99214 OFFICE O/P EST MOD 30 MIN: CPT | Performed by: FAMILY MEDICINE

## 2020-11-16 PROCEDURE — 4040F PNEUMOC VAC/ADMIN/RCVD: CPT | Performed by: FAMILY MEDICINE

## 2020-11-16 ASSESSMENT — ENCOUNTER SYMPTOMS
ANAL BLEEDING: 0
NAUSEA: 0
COUGH: 1
CHEST TIGHTNESS: 0
DIARRHEA: 0
ABDOMINAL PAIN: 0
CONSTIPATION: 0
SHORTNESS OF BREATH: 1

## 2020-11-16 NOTE — PROGRESS NOTES
[facility] PHYSICIAN SERVICES  [facility] FAMILY MEDICINE  [address] [address]  Dept: [phone]  Dept Fax: : [phone]    Napoleon Avila is a 80 y.o. female who presents today for her medical conditions/complaints as noted below. Napoleon Avila is here for 6 Month Follow-Up        HPI:   CC: Here today to discuss the followinyear-old continues to have issues with palpitations and shortness of breath. She finds out that minimal exertion causes the worsening shortness of breath. She states the palpitations are also triggered by increased activity. She does have evidence of SVT and multiple PVCs on her cardiac monitoring. 2D echocardiogram showed no severe abnormalities. Hypothyroidism  Symptoms are stable. No temperature intolerance, fatigue, or mood disturbance from baseline. Complaint with current medication. Hypertension  Compliant with medications. No adverse effects from medication. No lightheadedness, palpitations, or chest pain.             HPI    Past Medical History:   Diagnosis Date    Breast cyst     Cancer ([address]     surgery only    Colon polyp     Family history of colon cancer 2016    GERD (gastroesophageal reflux disease)     History of double vision     with surgical correction    Hypertension     Thyroid disease       Past Surgical History:   Procedure Laterality Date    ANKLE FRACTURE SURGERY Right     BACK SURGERY      plating    BREAST RECONSTRUCTION      césar. mastectomy with replacement    COLONOSCOPY  2007    Dr. Norbert Peraza (LOWER) N/A 2016    Dr Isaiah Momin non-obstructing simple appearing pancreatic cyst of 2.2 x 1.9mm-no high risk features were seen    EYE SURGERY      recent    HYSTERECTOMY      [place] TOTAL KNEE ARTHROPLASTY Left 10/17/2017    KNEE TOTAL ARTHROPLASTY performed by [employer] [facility] [facility]  TUBAL LIGATION      UPPER GASTROINTESTINAL ENDOSCOPY N/A 2016    Dr Jose C Bhatia-gastritis/gastropathy       Family History   Problem Relation Age of Onset    Colon Cancer Mother     Colon Polyps Mother     Rectal Cancer Mother 80    Breast Cancer Daughter 64        Had bilateral mastectomy    Esophageal Cancer Neg Hx     Liver Disease Neg Hx     Lung Cancer Neg Hx     Stomach Cancer Neg Hx        Social History     Tobacco Use    Smoking status: Former Smoker     Packs/day: 1.00     Years: 17.00     Pack years: 17.00     Last attempt to quit: 1985     Years since quittin.5    Smokeless tobacco: Never Used   Substance Use Topics    Alcohol use: Yes     Comment: rare     Current Outpatient Medications   Medication Sig Dispense Refill    lisinopril (PRINIVIL;ZESTRIL) 20 MG tablet Take 2 tablets by mouth once daily 180 tablet 0    SYNTHROID 50 MCG tablet Take 1 tablet by mouth Daily 30 tablet 3    metoprolol tartrate (LOPRESSOR) 50 MG tablet TAKE 1 TABLET BY MOUTH TWICE DAILY 60 tablet 5    triamterene-hydrochlorothiazide (MAXZIDE-25) 37.5-25 MG per tablet Take 1 tablet by mouth daily as needed      TURMERIC PO Take 1,000 mg by mouth daily      Green Tea, Deepika sinensis, (GREEN TEA PO) Take 400 mg by mouth daily as needed      Coenzyme Q10 (COQ10) 200 MG CAPS Take 1 tablet by mouth daily      Cholecalciferol (VITAMIN D3) 2000 units TABS Take by mouth      Probiotic Product (PROBIOTIC-10 PO) Take by mouth      NONFORMULARY Take 1 tablet by mouth daily Indications: focus factor      NONFORMULARY       KRILL OIL OMEGA-3 PO Take by mouth daily      docusate sodium (COLACE) 100 MG capsule Take 1 capsule by mouth daily To prevent constipation 20 capsule 0    Acetaminophen (TYLENOL 8 HOUR PO) Take 2 tablets by mouth 3 times daily as needed Indications: Pain       Multiple Vitamin (MULTIVITAMIN PO) Take 1 tablet by mouth daily       DiphenhydrAMINE HCl (BENADRYL ALLERGY PO) Take 1 tablet by mouth daily as needed Indications: Seasonal Allergy        No current facility-administered medications for this visit. Allergies   Allergen Reactions    Demerol Other (See Comments)     Abdominal spasms with severe n/v/    Meperidine Other (See Comments)    Nsaids Other (See Comments)     Dark tarry stools    Stadol [Butorphanol Tartrate]     Sulfa Antibiotics        Health Maintenance   Topic Date Due    DTaP/Tdap/Td vaccine (1 - Tdap) 09/14/2021 (Originally 3/19/1953)    Flu vaccine (1) 09/14/2021 (Originally 9/1/2020)    Shingles Vaccine (1 of 2) 09/14/2021 (Originally [birth year]19/1984)    Annual Wellness Visit (AWV)  06/12/2021    TSH testing  08/11/2021    Potassium monitoring  09/10/2021    Creatinine monitoring  09/10/2021    Pneumococcal 65+ years Vaccine  Completed    Hepatitis A vaccine  Aged Out    Hepatitis B vaccine  Aged Out    Hib vaccine  Aged Out    Meningococcal (ACWY) vaccine  Aged Out       Subjective:      Review of Systems   Constitutional: Negative for chills and fever. HENT: Negative for congestion. Respiratory: Positive for cough and shortness of breath. Negative for chest tightness. Cardiovascular: Negative for chest pain, palpitations and leg swelling. Gastrointestinal: Negative for abdominal pain, anal bleeding, constipation, diarrhea and nausea. Genitourinary: Negative for difficulty urinating. Psychiatric/Behavioral: Negative. SeeHPI for visit specific review of symptoms. All others negative      Objective:   /78   Pulse 83   Temp 97.5 °F (36.4 °C)   Wt 148 lb (67.1 kg)   SpO2 95%   BMI 26.22 kg/m²   Physical Exam  Physical Exam   Constitutional: She appears well-developed. Does not appear ill. Eyes: Pupils are equal, round, and reactive to light. Conjunctiva and Lids normal.  Neck: Normal range of motion. Neck supple. No masses. Neck Symmetric. Normal tracheal position.   No thyroid enlargement  Cardiovascular: Normal rate and regular rhythm. Exam reveals no friction rub. Carotid arteries: no bruit observed. No murmur heard. Respiratory:  Effort normal and breath sounds normal. No respiratory distress. No wheezes. No rales. No use of accessory muscles or intercostal retractions. Abdominal: Soft. Bowel sounds are normal. exhibits no distension. There is no tenderness. There is no rebound and no guarding. Musculoskeletal: exhibits no edema. Normal gait. Neurological: alert. Psychiatric: normal mood and affect. Her behavior is normal. Normal judgement and insight observed. Recent Results (from the past 672 hour(s))   ECHO Complete 2D W Doppler W Color    Collection Time: 10/23/20  3:18 PM   Result Value Ref Range    Left Ventricular Ejection Fraction 58     LVEF MODALITY ECHO                Assessment & Plan: The following diagnoses and conditions are stable with no further orders unless indicated:  1. Palpitations  2. Dyspnea, unspecified type  Would like to refer her to cardiology for their opinion regarding further testing including possible stress test.  In the meantime we will continue with her current medications  Advised to report any change in chest pain to me  - Jaylon Hodge MD, Cardiology, [place]    3. Essential (primary) hypertension  BP Readings from Last 3 Encounters:   11/16/20 132/78   09/14/20 138/88   08/11/20 (!) 158/84     Stable    4. Primary hypothyroidism  Lab Results   Component Value Date    TSH 1.200 08/11/2020    T4FREE 1.36 08/11/2020         Stable        Return in about 7 months (around 6/16/2021) for Routine follow up - 15 minute visit. Discussed use, benefit, and side effects of prescribed medications. All patient questions answered. Pt voiced understanding. Reviewed health maintenance. Instructedto continue current medications, diet and exercise. Patient agreed with treatmentplan. Follow up as directed.        Note dictated using [id number] [employer]

## 2020-11-18 ENCOUNTER — OFFICE VISIT (OUTPATIENT)
Dept: CARDIOLOGY | Age: 85
End: 2020-11-18
Payer: MEDICARE

## 2020-11-18 VITALS
WEIGHT: 148 LBS | SYSTOLIC BLOOD PRESSURE: 122 MMHG | DIASTOLIC BLOOD PRESSURE: 64 MMHG | HEIGHT: 63 IN | HEART RATE: 84 BPM | BODY MASS INDEX: 26.22 KG/M2

## 2020-11-18 PROCEDURE — 4040F PNEUMOC VAC/ADMIN/RCVD: CPT | Performed by: INTERNAL MEDICINE

## 2020-11-18 PROCEDURE — G8427 DOCREV CUR MEDS BY ELIG CLIN: HCPCS | Performed by: INTERNAL MEDICINE

## 2020-11-18 PROCEDURE — 1123F ACP DISCUSS/DSCN MKR DOCD: CPT | Performed by: INTERNAL MEDICINE

## 2020-11-18 PROCEDURE — 99202 OFFICE O/P NEW SF 15 MIN: CPT | Performed by: INTERNAL MEDICINE

## 2020-11-18 PROCEDURE — 93000 ELECTROCARDIOGRAM COMPLETE: CPT | Performed by: INTERNAL MEDICINE

## 2020-11-18 PROCEDURE — G8484 FLU IMMUNIZE NO ADMIN: HCPCS | Performed by: INTERNAL MEDICINE

## 2020-11-18 PROCEDURE — 1090F PRES/ABSN URINE INCON ASSESS: CPT | Performed by: INTERNAL MEDICINE

## 2020-11-18 PROCEDURE — 1036F TOBACCO NON-USER: CPT | Performed by: INTERNAL MEDICINE

## 2020-11-18 PROCEDURE — G8417 CALC BMI ABV UP PARAM F/U: HCPCS | Performed by: INTERNAL MEDICINE

## 2020-11-18 NOTE — PROGRESS NOTES
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 She dates her most recent symptoms to some 3 months ago at which time she experienced what she suspected was a Covid infection that lasted about a month. In the wake of that episode she began to notice some palpitations and some occasional episodes of rapid heartbeat. She tells me that with exertion she experiences chest pressure and shortness of breath and has the same symptoms if she has an episode of tachycardia. The chest pressure and shortness of breath responded promptly to rest.  As noted above her risk profile for coronary disease includes age, remote tobacco abuse [quit 36 years ago], hypertension and dyslipidemia. Her last recorded lipid profile in the medical record is from March 2018 with an LDL of 143, HDL 57, and triglycerides 125. Pressor medical history reveals the fact that she's had bilateral mastectomies for carcinoma with the first surgery performed back in the 70s. She lives alone and has no family in this area. On exam she carries 148 pounds in a 5 foot 3 inch frame. Pressure is 122/64 with a pulse of 84. Very pleasant elderly lady who appears much younger than her stated age. EOMs full, sclerae and conjunctiva normal. PERRLA. Mask in place. Trachea midline with no neck masses. Assessment of internal jugular veins reveals no elevation of central venous pressure at 45 degrees. Carotid pulses normal without delay or bruit. Thyroid normal to palpation. Evidence of previous bilateral mastectomies. Chest exam reveals normal respiratory effort, no abnormal breath sounds and normal expiratory phase. No skin lesions seen. PMI normal. S1, S2 normal without murmur or maurice or click. Normal bowel sounds without palpable mass or bruit. No clubbing or acrocyanosis. No significant lower extremity edema or signs of venous insufficiency. General motor strength appears to be within normal limits. Normal range of motion with normal gait. Alert, oriented x 3, memory and cognition normal as reflected by history and conversation. EKG reveals a sinus rhythm with frequent PVCs. Assessment/plan:  1. Episodic chest pain/dyspnea/ventricular ectopy - evaluation to this point reveals basically a normal echo without evidence of structural heart disease, and an extended monitor demonstrated some brief SVT and a single 8 beat run of ventricular tachycardia at a rate of 117 bpm.  We will obtain a Lexiscan dual-isotope as her history is strongly suggestive of coronary disease. 2.  Dyslipidemia - no recent values. We'll obtain a fasting lipid profile  3. Hypertension - appears to be well controlled    Medical records reviewed prior to today's clinic visit including visually reviewing recent diagnostic studies such as ECHOs, labs, and angiograms as well as reading previous encounter notes. More than 15 minutes spent face-to-face with patient in evaluating, and carefully explaining problems and the planned approach and the reasons behind the decisions.

## 2020-11-19 ENCOUNTER — TELEPHONE (OUTPATIENT)
Dept: CARDIOLOGY | Age: 85
End: 2020-11-19

## 2020-11-19 NOTE — TELEPHONE ENCOUNTER
Returned patient's call. Scheduled her follow up appointment for Guillermo bush at [address] per patient's request and provided her with the number for central scheduling for lexiscan. She voiced understanding.

## 2020-11-19 NOTE — TELEPHONE ENCOUNTER
Dr ocampo seen this pt yesterday at [facility], he told the pt to call you today.   Please call the pt     [phone]

## 2020-12-03 ENCOUNTER — HOSPITAL ENCOUNTER (OUTPATIENT)
Dept: NUCLEAR MEDICINE | Age: 85
Discharge: HOME | End: 2020-12-03
Payer: MEDICARE

## 2020-12-04 ENCOUNTER — HOSPITAL ENCOUNTER (OUTPATIENT)
Dept: NUCLEAR MEDICINE | Age: 85
Discharge: HOME | End: 2020-12-06
Payer: MEDICARE

## 2020-12-04 LAB
LV EF: 57 %
LVEF MODALITY: NORMAL

## 2020-12-04 PROCEDURE — 78452 HT MUSCLE IMAGE SPECT MULT: CPT

## 2020-12-04 PROCEDURE — A9500 TC99M SESTAMIBI: HCPCS | Performed by: INTERNAL MEDICINE

## 2020-12-04 PROCEDURE — 6360000002 HC RX W HCPCS: Performed by: INTERNAL MEDICINE

## 2020-12-04 PROCEDURE — 3430000000 HC RX DIAGNOSTIC RADIOPHARMACEUTICAL: Performed by: INTERNAL MEDICINE

## 2020-12-04 RX ADMIN — TETRAKIS(2-METHOXYISOBUTYLISOCYANIDE)COPPER(I) TETRAFLUOROBORATE 10 MILLICURIE: 1 INJECTION, POWDER, LYOPHILIZED, FOR SOLUTION INTRAVENOUS at 10:29

## 2020-12-04 RX ADMIN — TETRAKIS(2-METHOXYISOBUTYLISOCYANIDE)COPPER(I) TETRAFLUOROBORATE 30 MILLICURIE: 1 INJECTION, POWDER, LYOPHILIZED, FOR SOLUTION INTRAVENOUS at 10:29

## 2020-12-04 RX ADMIN — REGADENOSON 0.4 MG: 0.08 INJECTION, SOLUTION INTRAVENOUS at 10:29

## 2020-12-07 ENCOUNTER — OFFICE VISIT (OUTPATIENT)
Dept: CARDIOLOGY | Age: 85
End: 2020-12-07
Payer: MEDICARE

## 2020-12-07 VITALS
HEART RATE: 70 BPM | OXYGEN SATURATION: 97 % | HEIGHT: 63 IN | BODY MASS INDEX: 26.22 KG/M2 | SYSTOLIC BLOOD PRESSURE: 130 MMHG | WEIGHT: 148 LBS | DIASTOLIC BLOOD PRESSURE: 70 MMHG

## 2020-12-07 PROBLEM — I47.10 PAROXYSMAL SVT (SUPRAVENTRICULAR TACHYCARDIA): Status: ACTIVE | Noted: 2020-12-07

## 2020-12-07 PROBLEM — I47.1 PAROXYSMAL SVT (SUPRAVENTRICULAR TACHYCARDIA) (HCC): Status: ACTIVE | Noted: 2020-12-07

## 2020-12-07 PROCEDURE — 4040F PNEUMOC VAC/ADMIN/RCVD: CPT | Performed by: CLINICAL NURSE SPECIALIST

## 2020-12-07 PROCEDURE — G8427 DOCREV CUR MEDS BY ELIG CLIN: HCPCS | Performed by: CLINICAL NURSE SPECIALIST

## 2020-12-07 PROCEDURE — G8484 FLU IMMUNIZE NO ADMIN: HCPCS | Performed by: CLINICAL NURSE SPECIALIST

## 2020-12-07 PROCEDURE — 1123F ACP DISCUSS/DSCN MKR DOCD: CPT | Performed by: CLINICAL NURSE SPECIALIST

## 2020-12-07 PROCEDURE — G8417 CALC BMI ABV UP PARAM F/U: HCPCS | Performed by: CLINICAL NURSE SPECIALIST

## 2020-12-07 PROCEDURE — 99213 OFFICE O/P EST LOW 20 MIN: CPT | Performed by: CLINICAL NURSE SPECIALIST

## 2020-12-07 PROCEDURE — 1090F PRES/ABSN URINE INCON ASSESS: CPT | Performed by: CLINICAL NURSE SPECIALIST

## 2020-12-07 PROCEDURE — 1036F TOBACCO NON-USER: CPT | Performed by: CLINICAL NURSE SPECIALIST

## 2020-12-07 ASSESSMENT — ENCOUNTER SYMPTOMS
EYE REDNESS: 0
ABDOMINAL PAIN: 0
VOMITING: 0
CHEST TIGHTNESS: 1
COUGH: 0
SHORTNESS OF BREATH: 1
FACIAL SWELLING: 0
NAUSEA: 0
WHEEZING: 0

## 2020-12-07 NOTE — PATIENT INSTRUCTIONS
such as running, swimming, cycling, or playing tennis or team sports. · Eat heart-healthy foods. · Stay at a healthy weight. Lose weight if you need to. · Get enough sleep. Keep your room dark and quiet, and try to go to bed at the same time every night. · Limit alcohol to 2 drinks a day for men and 1 drink a day for women. Too much alcohol can cause health problems. If drinking alcohol causes more premature heartbeats, do not drink it. · If your doctor prescribes medicine, take it exactly as prescribed. Call your doctor if you think you are having a problem with your medicine. When should you call for help? Call 911 anytime you think you may need emergency care. For example, call if:    · You passed out (lost consciousness). Call your doctor now or seek immediate medical care if:    · You are dizzy or lightheaded, or you feel like you may faint.     · You are short of breath. Watch closely for changes in your health, and be sure to contact your doctor if you have any problems. Where can you learn more? Go to https://[employer] org and sign in to your [employer] account. Enter U593 in the [employer] box to learn more about \"Premature Heartbeat: Care Instructions. \"     If you do not have an account, please click on the \"Sign Up Now\" link. Current as of: December 16, 2019               Content Version: 12.6  © 8436-0023 [employer]. Care instructions adapted under license by [facility] ([facility]). If you have questions about a medical condition or this instruction, always ask your healthcare professional. [address] any warranty or liability for your use of this information.

## 2020-12-07 NOTE — PROGRESS NOTES
[facility], [address] [place] [address] DARRELL Capps [facility] Trudy Sprague  Phone: [phone]  Fax: [phone]    OFFICE VISIT:  2020    Jose G Holder - : 1934    Reason For Visit:  Demarco Bartholomew is a 80 y.o. female who is here for Follow-up (Patient here for [place] [facility] results) and Results       Diagnosis Orders   1. PVC (premature ventricular contraction)     2. Paroxysmal SVT (supraventricular tachycardia) ([address]     3. Essential (primary) hypertension     4. Hypothyroidism due to Hashimoto's thyroiditis           HPI  Patient is here for follow-up today to discuss results of her Lexiscan nuclear stress test.  She was referred to our office in November for ventricular ectopy and brief SVT found on recent heart monitor in August.  There was also some concerns about chest discomfort and dyspnea associated with palpitations. Other history includes hypertension. A recent echocardiogram looked good with the exception of diastolic dysfunction    She continues to experience some mild chest discomfort and dyspnea when she has a significant amount of palpitations. Patient states she thinks she may have had coronavirus in August and symptoms seem to accelerate after this illness (she was never tested). She denies exertional chest discomfort or dyspnea. 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 She uses green tea extract in pill form when she feels tired at home and generally takes it on a daily basis. Ulysses Sparks MD is PCP.   Jose G Holder has the following history as recorded in [facility]:    Patient Active Problem List    Diagnosis Date Noted    Paroxysmal SVT (supraventricular tachycardia) ([facility] 2020    Personal history of breast cancer 2020    Memory loss     History of memory loss 2019    Lumbosacral spondylosis without myelopathy 2019    Hx of spinal surgery 2019    DDD (degenerative disc disease), lumbar 2019    Iron deficiency anemia 10/17/2017    Essential (primary) hypertension 09/29/2017    Recurrent major depressive disorder, in full remission ([facility] [address] 09/29/2017    Familial hypercholesterolemia 09/29/2017    Hypothyroidism due to Hashimoto's thyroiditis 09/29/2017    Gastroesophageal reflux disease without esophagitis 09/29/2017    Primary osteoarthritis involving multiple joints 09/29/2017    Statin intolerance 09/29/2017    Gastritis without bleeding 06/17/2016    History of colonic polyps 06/17/2016     Past Medical History:   Diagnosis Date    Breast cyst     Cancer [facility])     surgery only    Colon polyp     Family history of colon cancer 6/17/2016    GERD (gastroesophageal reflux disease)     History of double vision     with surgical correction    Hypertension     Thyroid disease      Past Surgical History:   Procedure Laterality Date    ANKLE FRACTURE SURGERY Right     BACK SURGERY      plating    BREAST RECONSTRUCTION      césar. mastectomy with replacement    COLONOSCOPY  04/19/2007    Dr. Joselyn Adler (LOWER) N/A 5/25/2016    Dr Silvio Mccormack non-obstructing simple appearing pancreatic cyst of 2.2 x 1.9mm-no high risk features were seen    EYE SURGERY      recent    HYSTERECTOMY      MN TOTAL KNEE ARTHROPLASTY Left 10/17/2017    KNEE TOTAL ARTHROPLASTY performed by Tiago Harris MD at [address]      UPPER GASTROINTESTINAL ENDOSCOPY N/A 5/25/2016    Dr NNEKA Bhatia-gastritis/gastropathy     Family History   Problem Relation Age of Onset    Colon Cancer Mother     Colon Polyps Mother     Rectal Cancer Mother 80    Breast Cancer Daughter 64        Had bilateral mastectomy    Esophageal Cancer Neg Hx     Liver Disease Neg Hx     Lung Cancer Neg Hx     Stomach Cancer Neg Hx      Social History     Tobacco Use    Smoking status: Former Smoker     Packs/day: 1.00     Years: 17.00     Pack years: 17.00 shortness of breath (with palps). Negative for cough and wheezing. Cardiovascular: Negative for chest pain, palpitations and leg swelling. Gastrointestinal: Negative for abdominal pain, nausea and vomiting. Endocrine: Negative for cold intolerance and heat intolerance. Genitourinary: Negative for dysuria and hematuria. Musculoskeletal: Negative for arthralgias and myalgias. Skin: Negative for pallor and rash. Neurological: Negative for dizziness, seizures, syncope, weakness and light-headedness. Hematological: Does not bruise/bleed easily. Psychiatric/Behavioral: Negative for agitation. The patient is not nervous/anxious. Objective  Vital Signs - /70   Pulse 70   Ht 5' 3\" (1.6 m)   Wt 148 lb (67.1 kg)   SpO2 97%   BMI 26.22 kg/m²    Wt Readings from Last 3 Encounters:   12/07/20 148 lb (67.1 kg)   11/18/20 148 lb (67.1 kg)   11/16/20 148 lb (67.1 kg)      Physical Exam  Vitals signs and nursing note reviewed. Constitutional:       General: She is not in acute distress. Appearance: Normal appearance. She is well-developed. She is not diaphoretic. HENT:      Head: Normocephalic and atraumatic. Right Ear: Hearing and external ear normal.      Left Ear: Hearing and external ear normal.      Nose: Nose normal.   Eyes:      General:         Right eye: No discharge. Left eye: No discharge. Pupils: Pupils are equal, round, and reactive to light. Neck:      Musculoskeletal: Neck supple. No muscular tenderness. Thyroid: No thyromegaly. Vascular: No carotid bruit or JVD. Trachea: No tracheal deviation. Cardiovascular:      Rate and Rhythm: Normal rate. Rhythm irregular. Heart sounds: Normal heart sounds. No murmur. No friction rub. No gallop. Pulmonary:      Effort: Pulmonary effort is normal. No respiratory distress. Breath sounds: Normal breath sounds. No wheezing or rales. Abdominal:      Palpations: Abdomen is soft. Tenderness: There is no abdominal tenderness. Musculoskeletal:         General: No swelling or deformity. Comments: Normal gait and station   Skin:     General: Skin is warm and dry. Findings: No rash. Neurological:      General: No focal deficit present. Mental Status: She is alert and oriented to person, place, and time. Cranial Nerves: No cranial nerve deficit. Psychiatric:         Mood and Affect: Mood normal.         Behavior: Behavior normal.         Judgment: Judgment normal.         Data:    Summary   Mitral valve leaflets are mildly thickened with preserved leaflet   mobility. Mild mitral regurgitation is present. Mildly thickened aortic valve leaflets with preserved leaflet mobility. Tricuspid valve is structurally normal.   Mild tricuspid regurgitation with estimated RVSP of 36 mm Hg. Mildly dilated left atrium. Normal left ventricular size with preserved LV function and an estimated   ejection fraction of approximately 55-60%. Impaired relaxation compatible with diastolic dysfunction. ( reversed E/A   ratio)   No regional wall motion abnormalities. Lexiscan 12/4/20  Summary impressions:    Normal study with normal ejection fraction 57% normal perfusion study    without evidence of ischemia or prior scar    Signed by Dr Anamaria Conklin on 12/4/2020 3:14 PM      Heart Monitor 8/12-8/26/20  Summary impressions:     1. Sinus rhythm minimal heart rate 49 average rate 74 maximal   rate 137     2. 1 episode ventricular tachycardia recorded 8 beats maximal   rate 139 average rate 117     3. 20 episode supraventricular tachycardia recorded longest 9.3   seconds maximal rate 184     4. No pauses greater than 3.0 seconds were recorded     5. No episodes of complete or Mobitz 2 atrioventricular block   were recorded     6. No episodes of atrial fibrillation were recorded     7. No diary or triggered events were recorded     8.  Frequent PVCs otherwise rare ectopy noted occasional bigeminy   and trigeminy noted     Lab Results   Component Value Date    TSHFT4 0.57 08/02/2019    TSH 1.200 08/11/2020     Lab Results   Component Value Date     09/10/2020    K 5.0 09/10/2020     09/10/2020    CO2 24 09/10/2020    BUN 29 (H) 09/10/2020    CREATININE 1.2 (H) 09/10/2020    GLUCOSE 103 09/10/2020    CALCIUM 10.2 09/10/2020    PROT 6.5 (L) 08/11/2020    LABALBU 4.2 08/11/2020    BILITOT <0.2 08/11/2020    ALKPHOS 84 08/11/2020    AST 19 08/11/2020    ALT 12 08/11/2020    LABGLOM 43 (A) 09/10/2020    GFRAA 51 (L) 09/10/2020       Assessment:     Diagnosis Orders   1. PVC (premature ventricular contraction)     2. Paroxysmal SVT (supraventricular tachycardia) ([address]     3. Essential (primary) hypertension     4. Hypothyroidism due to Hashimoto's thyroiditis         PVCs[facility]oxysmal SV[facility] of [facility]. We reviewed other things that may trigger arrhythmia such as caffeine. She uses a green tea extract on a daily basis and also drinks coffee in the morning. We discussed eliminating the green tea extract to see if this may help deter her symptoms as it likely contains caffeine. We discussed using vagal maneuvers when she has symptoms. She is on a moderate dose of metoprolol and had some overnight bradycardia per heart monitor in August.  I would be hesitant to increase her metoprolol for this reason. Continue to monitor. Call for worsening symptoms. Hypertension-well-controlled on current regimen    Hypothyroidism-reviewed recent TSH done this summer and also recent electrolyte panel with no significant abnormalities      Stable cardiovascular status. No evidence of overt heart failure,angina or dysrhythmia. Plan    Return in about 6 months (around 6/7/2021) for Dr. Christian Canales.    Stop Green Tea Extract (probably has caffeine)  Avoid/ limit caffeine  May use vagal maneuvers to slow down heart rate such as coughing or bearing down to have a bowel movement to slow down heart rate    Call with any questionsor concerns  Follow up with Mainor Sommers MD for non cardiac problems  Report any new problems  Cardiovascular Fitness-Exercise as tolerated. Strive for 15 minutes of exercise most days of the week. Cardiac / HealthyDiet  Continue current medications as directed  Continue plan of treatment  It is always recommended that you bring your medicationsbottles with you to each visit - this is for your safety!        Christiano Cuello APRN

## 2020-12-08 ENCOUNTER — TELEPHONE (OUTPATIENT)
Dept: SURGERY | Age: 85
End: 2020-12-08

## 2020-12-08 NOTE — TELEPHONE ENCOUNTER
Left VM for patient of appointments for:    Breast US at [facility] on 1/27/2021 at [facility].  Will see Dr. Gene Willson for a breast exam following US at 2:30 PM.     I also sent appointment cards in the mail for verification.

## 2020-12-10 DIAGNOSIS — U07.1 COVID-19: ICD-10-CM

## 2020-12-10 LAB — SARS-COV-2 ANTIBODY, TOTAL: NEGATIVE

## 2020-12-23 RX ORDER — LISINOPRIL 20 MG/1
TABLET ORAL
Qty: 180 TABLET | Refills: 0 | Status: SHIPPED | OUTPATIENT
Start: 2020-12-23 | End: 2021-03-31

## 2020-12-23 NOTE — TELEPHONE ENCOUNTER
Srinivasa Gopal called to request a refill on her medication.       Last office visit : 11/16/2020   Next office visit : 6/17/2021     Requested Prescriptions     Pending Prescriptions Disp Refills    lisinopril (PRINIVIL;ZESTRIL) 20 MG tablet [Pharmacy Med Name: Lisinopril 20 MG Oral Tablet] 180 tablet 0     Sig: Take 2 tablets by mouth once daily            Cristofer Beltrán MA

## 2021-01-27 ENCOUNTER — OFFICE VISIT (OUTPATIENT)
Dept: SURGERY | Age: 86
End: 2021-01-27
Payer: MEDICARE

## 2021-01-27 ENCOUNTER — HOSPITAL ENCOUNTER (OUTPATIENT)
Dept: ULTRASOUND IMAGING | Age: 86
Discharge: HOME | End: 2021-01-27
Payer: MEDICARE

## 2021-01-27 DIAGNOSIS — Z85.3 PERSONAL HISTORY OF BREAST CANCER: Primary | ICD-10-CM

## 2021-01-27 DIAGNOSIS — R92.8 OTHER ABNORMAL AND INCONCLUSIVE FINDINGS ON DIAGNOSTIC IMAGING OF BREAST: ICD-10-CM

## 2021-01-27 PROCEDURE — 4040F PNEUMOC VAC/ADMIN/RCVD: CPT | Performed by: SURGERY

## 2021-01-27 PROCEDURE — 1090F PRES/ABSN URINE INCON ASSESS: CPT | Performed by: SURGERY

## 2021-01-27 PROCEDURE — G8417 CALC BMI ABV UP PARAM F/U: HCPCS | Performed by: SURGERY

## 2021-01-27 PROCEDURE — 99213 OFFICE O/P EST LOW 20 MIN: CPT | Performed by: SURGERY

## 2021-01-27 PROCEDURE — G8428 CUR MEDS NOT DOCUMENT: HCPCS | Performed by: SURGERY

## 2021-01-27 PROCEDURE — 76642 ULTRASOUND BREAST LIMITED: CPT

## 2021-01-27 PROCEDURE — 1036F TOBACCO NON-USER: CPT | Performed by: SURGERY

## 2021-01-27 PROCEDURE — G8484 FLU IMMUNIZE NO ADMIN: HCPCS | Performed by: SURGERY

## 2021-01-27 PROCEDURE — 1123F ACP DISCUSS/DSCN MKR DOCD: CPT | Performed by: SURGERY

## 2021-01-27 NOTE — PROGRESS NOTES
HISTORY OF PRESENT ILLNESS:    Ms. Destiny Westfall is a 80 y.o. white female who presents with an abnormal Imaging of left axilla. She has a personal history of Left Breast Cancer. She underwent bilateral mastectomy and reconstruction for a left-sided cancer in 1973. She is unsure exactly what she had and says no lymph glands were taken at that time. 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 This was done at [facility].  We will try to track these records down. Her problem now is some peculiar pains in her left axilla that felt like when she had her cancer. She has a family history of breast cancer in her Daughter whom had a bilateral breast Mastectomy. She is post menopausal and is not on HRT.      1/27/2021-Unilateral Left Ultrasound  FINDINGS:  No mass lesion or adenopathy. No inflammatory changes, fluid   collection or abnormal skin thickening. IMPRESSION AND RECOMMENDATION:    No sonographic evidence of malignancy. No adenopathy. Normal exam.    BI-RADS CATEGORY 1: NEGATIVE   Signed by Dr Narinder Irving on 1/27/2021 2:47 PM       I reviewed the images and real-time with the technologist.  I do not see anything worrisome for malignancy. There is a normal fatty replaced lymph node but no other worrisome findings in the axilla whatsoever. BREAST EXAM:    Ellaree Kawasaki has a reconstructed breast on the right with no palpable masses. On the left she has a mastectomy scar with a lot of redundant skin but no palpable masses. I do not palpate anything worrisome in the left axilla. IMPRESSION: Previous breast cancer                          Doubt axillary recurrence    PLAN: Return in 6 months with a unilateral left axillary ultrasound with exam. She will call with any concerns.      I have seen, examined and reviewed this patient medication list, appropriate labs and imaging studies. I reviewed relevant medical records and others physicians notes. I discussed the plans of care with the patient. I answered all the questions to the patients satisfaction. I, Dr Alvarado Bautista, personally performed the services described in this documentation as scribed by Kaci Whitfield MA in my presence and is both accurate and complete. (Please note that portions of this note were completed with a voice recognition program. Efforts were made to edit the dictations but occasionally words are mis-transcribed.)  Over 50% of the total visit time of 20 minutes in face to face encounter with the patient, out of which more than 50% of the time was spent in counseling patient or family and coordination of care. Counseling included but was not limited to time spent reviewing labs, imaging studies/ treatment plan and answering questions.

## 2021-02-04 DIAGNOSIS — Z85.3 PERSONAL HISTORY OF BREAST CANCER: Primary | ICD-10-CM

## 2021-02-12 ENCOUNTER — IMMUNIZATION (OUTPATIENT)
Dept: VACCINE CLINIC | Facility: HOSPITAL | Age: 86
End: 2021-02-12

## 2021-02-12 PROCEDURE — 0011A: CPT | Performed by: OBSTETRICS & GYNECOLOGY

## 2021-02-12 PROCEDURE — 91301 HC SARSCO02 VAC 100MCG/0.5ML IM: CPT | Performed by: OBSTETRICS & GYNECOLOGY

## 2021-03-12 ENCOUNTER — IMMUNIZATION (OUTPATIENT)
Dept: VACCINE CLINIC | Facility: HOSPITAL | Age: 86
End: 2021-03-12

## 2021-03-12 PROCEDURE — 91301 HC SARSCO02 VAC 100MCG/0.5ML IM: CPT | Performed by: OBSTETRICS & GYNECOLOGY

## 2021-03-12 PROCEDURE — 0012A: CPT | Performed by: OBSTETRICS & GYNECOLOGY

## 2021-03-31 RX ORDER — LISINOPRIL 20 MG/1
TABLET ORAL
Qty: 180 TABLET | Refills: 0 | Status: SHIPPED | OUTPATIENT
Start: 2021-03-31 | End: 2021-07-02

## 2021-04-18 RX ORDER — METOPROLOL TARTRATE 50 MG/1
TABLET, FILM COATED ORAL
Qty: 180 TABLET | Refills: 0 | Status: SHIPPED | OUTPATIENT
Start: 2021-04-18 | End: 2021-08-03

## 2021-06-09 ENCOUNTER — OFFICE VISIT (OUTPATIENT)
Dept: CARDIOLOGY CLINIC | Age: 86
End: 2021-06-09
Payer: MEDICARE

## 2021-06-09 VITALS
BODY MASS INDEX: 25.69 KG/M2 | HEIGHT: 63 IN | WEIGHT: 145 LBS | SYSTOLIC BLOOD PRESSURE: 126 MMHG | DIASTOLIC BLOOD PRESSURE: 72 MMHG | HEART RATE: 60 BPM

## 2021-06-09 DIAGNOSIS — I47.1 PAROXYSMAL SVT (SUPRAVENTRICULAR TACHYCARDIA) (HCC): Primary | ICD-10-CM

## 2021-06-09 DIAGNOSIS — I49.3 PVC (PREMATURE VENTRICULAR CONTRACTION): ICD-10-CM

## 2021-06-09 PROCEDURE — 1090F PRES/ABSN URINE INCON ASSESS: CPT | Performed by: INTERNAL MEDICINE

## 2021-06-09 PROCEDURE — G8417 CALC BMI ABV UP PARAM F/U: HCPCS | Performed by: INTERNAL MEDICINE

## 2021-06-09 PROCEDURE — G8427 DOCREV CUR MEDS BY ELIG CLIN: HCPCS | Performed by: INTERNAL MEDICINE

## 2021-06-09 PROCEDURE — 93000 ELECTROCARDIOGRAM COMPLETE: CPT | Performed by: INTERNAL MEDICINE

## 2021-06-09 PROCEDURE — 1036F TOBACCO NON-USER: CPT | Performed by: INTERNAL MEDICINE

## 2021-06-09 PROCEDURE — 4040F PNEUMOC VAC/ADMIN/RCVD: CPT | Performed by: INTERNAL MEDICINE

## 2021-06-09 PROCEDURE — 99213 OFFICE O/P EST LOW 20 MIN: CPT | Performed by: INTERNAL MEDICINE

## 2021-06-09 PROCEDURE — 1123F ACP DISCUSS/DSCN MKR DOCD: CPT | Performed by: INTERNAL MEDICINE

## 2021-06-09 NOTE — PATIENT INSTRUCTIONS
Have a fasting lipid panel drawn. There is an active order in that is good until 11/2021. This can be done in [place] or [facility].

## 2021-06-09 NOTE — PROGRESS NOTES
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 Most recently seen in November 2020 with complaints of some episodic chest pressure and dyspnea prompting a Lexiscan dual-isotope which revealed no evidence of ischemia and normal left ventricular systolic function. On return today she relates no complaints suggestive of cardiac symptoms denying any tachypalpitations, change in exercise tolerance, or episodic chest discomfort. The last lipids recorded are from March 2018 [the November blood work never drawn] with an LDL of 143, HDL 57, triglycerides 125. She has been vaccinated for COVID-19. PHYSICAL EXAM  On exam she has 145 pounds in a 5 foot 3 inch frame. Pressure is 126/72 with pulse of 60. EOMs full, sclerae and conjunctiva normal. PERRLA. Mask in place. Trachea midline with no neck masses. Assessment of internal jugular veins reveals no elevation of central venous pressure at 45 degrees. Carotid pulses normal without delay or bruit. Thyroid normal to palpation. Chest exam reveals normal respiratory effort, no abnormal breath sounds and normal expiratory phase. No skin lesions seen. PMI normal. S1, S2 normal without murmur or maurice or click. Normal bowel sounds without palpable mass or bruit. No clubbing or acrocyanosis. No significant lower extremity edema or signs of venous insufficiency. General motor strength appears to be within normal limits. Normal range of motion with normal gait. Alert, oriented x 3, memory and cognition normal as reflected by history and conversation. EKG reveals a sinus rhythm without abnormality. ASSESSMENT/PLAN:   1. Hypertension -well controlled. Continue metoprolol lisinopril and Maxide. 2.  Dyslipidemia -will obtain a repeat value.   3.  Ventricular ectopy -none seen on today's tracing and no symptoms to suggest frequent RN spoke with patient regarding her recent blood work. She said she was told by behavioral health nurse that her Red Blood Cell Count was low- RN discussed that her RBC was normal and her platelet level continue to be low- (106) - it is noted that she was given the diagnosis of thrombocytopenia in 2016 during her hospitalization.    During the discussion she said that she was told by Dr. Hannah Loya's office her Na+ level was low at (129). Pt states she drinks roughly 90 oz of water daily. RN discussed that this could be contributing to her sodium levels being low as well. She is going to try eat  more salty foods/beverages (G2 gatorade, powerade, saltine crackers, canned soup) and decrease her water consumption (down to 60-70 oz) to help bring her sodium level up to a normal range VERSUS having her psychiatric medications changes.    Okay to try diet changes first before changing medications? Will keep her necessary repeat labs.    Please call patient to discuss and advise . Thank you!     recurrence  4.   Pandemic response -appropriate/vaccinated

## 2021-06-17 ENCOUNTER — OFFICE VISIT (OUTPATIENT)
Dept: FAMILY MEDICINE CLINIC | Age: 86
End: 2021-06-17
Payer: MEDICARE

## 2021-06-17 VITALS
DIASTOLIC BLOOD PRESSURE: 74 MMHG | TEMPERATURE: 97.2 F | WEIGHT: 149 LBS | HEIGHT: 63 IN | HEART RATE: 68 BPM | SYSTOLIC BLOOD PRESSURE: 110 MMHG | OXYGEN SATURATION: 98 % | BODY MASS INDEX: 26.4 KG/M2

## 2021-06-17 DIAGNOSIS — E03.9 PRIMARY HYPOTHYROIDISM: ICD-10-CM

## 2021-06-17 DIAGNOSIS — F33.42 RECURRENT MAJOR DEPRESSIVE DISORDER, IN FULL REMISSION (HCC): ICD-10-CM

## 2021-06-17 DIAGNOSIS — M54.50 CHRONIC MIDLINE LOW BACK PAIN WITHOUT SCIATICA: ICD-10-CM

## 2021-06-17 DIAGNOSIS — R53.83 OTHER FATIGUE: ICD-10-CM

## 2021-06-17 DIAGNOSIS — G89.29 CHRONIC MIDLINE LOW BACK PAIN WITHOUT SCIATICA: ICD-10-CM

## 2021-06-17 DIAGNOSIS — Z00.00 ANNUAL PHYSICAL EXAM: Primary | ICD-10-CM

## 2021-06-17 DIAGNOSIS — Z13.220 LIPID SCREENING: ICD-10-CM

## 2021-06-17 DIAGNOSIS — I10 ESSENTIAL (PRIMARY) HYPERTENSION: ICD-10-CM

## 2021-06-17 DIAGNOSIS — R35.0 URINARY FREQUENCY: ICD-10-CM

## 2021-06-17 PROCEDURE — 1123F ACP DISCUSS/DSCN MKR DOCD: CPT | Performed by: FAMILY MEDICINE

## 2021-06-17 PROCEDURE — 1090F PRES/ABSN URINE INCON ASSESS: CPT | Performed by: FAMILY MEDICINE

## 2021-06-17 PROCEDURE — G8417 CALC BMI ABV UP PARAM F/U: HCPCS | Performed by: FAMILY MEDICINE

## 2021-06-17 PROCEDURE — G0439 PPPS, SUBSEQ VISIT: HCPCS | Performed by: FAMILY MEDICINE

## 2021-06-17 PROCEDURE — 1036F TOBACCO NON-USER: CPT | Performed by: FAMILY MEDICINE

## 2021-06-17 PROCEDURE — G8427 DOCREV CUR MEDS BY ELIG CLIN: HCPCS | Performed by: FAMILY MEDICINE

## 2021-06-17 PROCEDURE — 99213 OFFICE O/P EST LOW 20 MIN: CPT | Performed by: FAMILY MEDICINE

## 2021-06-17 PROCEDURE — 4040F PNEUMOC VAC/ADMIN/RCVD: CPT | Performed by: FAMILY MEDICINE

## 2021-06-17 ASSESSMENT — PATIENT HEALTH QUESTIONNAIRE - PHQ9
2. FEELING DOWN, DEPRESSED OR HOPELESS: 0
SUM OF ALL RESPONSES TO PHQ QUESTIONS 1-9: 0
SUM OF ALL RESPONSES TO PHQ QUESTIONS 1-9: 0
SUM OF ALL RESPONSES TO PHQ9 QUESTIONS 1 & 2: 0
SUM OF ALL RESPONSES TO PHQ QUESTIONS 1-9: 0
1. LITTLE INTEREST OR PLEASURE IN DOING THINGS: 0

## 2021-06-17 ASSESSMENT — LIFESTYLE VARIABLES: HOW OFTEN DO YOU HAVE A DRINK CONTAINING ALCOHOL: 0

## 2021-06-17 NOTE — PROGRESS NOTES
[facility] PHYSICIAN SERVICES  [facility] FAMILY MEDICINE  [address] [address] [address]  Dept: [phone]  Dept Fax: [phone]: [phone]    Harish Davis is a 80 y.o. female who presents today for her medical conditions/complaints as noted below. Harish Davis is here for Medicare AWV        HPI:   CC: Here today to discuss the following:      Unfortunately she continues to have issues with chronic lower back pain. She is been to pain management and physical therapy without relief. Currently she is relying on Tylenol and heating pads. She is not interested in opioid medications or muscle relaxants    Hypertension  Compliant with medications. No adverse effects from medication. No lightheadedness, palpitations, or chest pain. Hyperlipidemia  Tolerating current cholesterol medication without side effects. No body aches. Attempting to reduce processed sugar and cholesterol from diet. Hypothyroidism  Symptoms are stable. No temperature intolerance, fatigue, or mood disturbance from baseline. Complaint with current medication.             HPI    Past Medical History:   Diagnosis Date    Breast cyst     Cancer ([address]     surgery only    Colon polyp     Family history of colon cancer 6/17/2016    GERD (gastroesophageal reflux disease)     History of double vision     with surgical correction    Hypertension     Thyroid disease       Past Surgical History:   Procedure Laterality Date    ANKLE FRACTURE SURGERY Right     BACK SURGERY      plating    BREAST RECONSTRUCTION      césar. mastectomy with replacement    COLONOSCOPY  04/19/2007    Dr. Nj Godfrey (LOWER) N/A 5/25/2016    Dr Kristopher Naranjo non-obstructing simple appearing pancreatic cyst of 2.2 x 1.9mm-no high risk features were seen    EYE SURGERY      recent    HYSTERECTOMY      [place] TOTAL KNEE ARTHROPLASTY Left 10/17/2017    KNEE TOTAL ARTHROPLASTY performed by Deloris Zacarias MD Cha[facility]ce[facility] at [facility]    TONSILLECTOMY AND ADENOIDECTOMY      TUBAL LIGATION      UPPER GASTROINTESTINAL ENDOSCOPY N/A 2016    Dr Demetrio Bhatia-gastritis/gastropathy       Family History   Problem Relation Age of Onset    Colon Cancer Mother     Colon Polyps Mother     Rectal Cancer Mother 80    Breast Cancer Daughter 64        Had bilateral mastectomy    Esophageal Cancer Neg Hx     Liver Disease Neg Hx     Lung Cancer Neg Hx     Stomach Cancer Neg Hx        Social History     Tobacco Use    Smoking status: Former Smoker     Packs/day: 1.00     Years: 17.00     Pack years: 17.00     Quit date: 1985     Years since quittin.1    Smokeless tobacco: Never Used   Substance Use Topics    Alcohol use: Yes     Comment: rare     Current Outpatient Medications   Medication Sig Dispense Refill    metoprolol tartrate (LOPRESSOR) 50 MG tablet Take 1 tablet by mouth twice daily 180 tablet 0    lisinopril (PRINIVIL;ZESTRIL) 20 MG tablet Take 2 tablets by mouth once daily 180 tablet 0    SYNTHROID 50 MCG tablet Take 1 tablet by mouth Daily 30 tablet 3    triamterene-hydrochlorothiazide (MAXZIDE-25) 37.5-25 MG per tablet Take 1 tablet by mouth daily as needed      Coenzyme Q10 (COQ10) 200 MG CAPS Take 1 tablet by mouth daily      Cholecalciferol (VITAMIN D3) 2000 units TABS Take by mouth      Probiotic Product (PROBIOTIC-10 PO) Take by mouth      NONFORMULARY Take 1 tablet by mouth daily Indications: focus factor      NONFORMULARY       Multiple Vitamin (MULTIVITAMIN PO) Take 1 tablet by mouth daily       DiphenhydrAMINE HCl (BENADRYL ALLERGY PO) Take 1 tablet by mouth daily as needed Indications: Seasonal Allergy       TURMERIC PO Take 1,000 mg by mouth daily (Patient not taking: Reported on 2021)      docusate sodium (COLACE) 100 MG capsule Take 1 capsule by mouth daily To prevent constipation (Patient not taking: Reported on 2021) 20 capsule 0     No current facility-administered medications for this visit. Allergies   Allergen Reactions    Demerol Other (See Comments)     Abdominal spasms with severe n/v/    Meperidine Other (See Comments)    Nsaids Other (See Comments)     Dark tarry stools    Stadol [Butorphanol Tartrate]     Sulfa Antibiotics        Health Maintenance   Topic Date Due    Annual Wellness Visit (AWV)  06/12/2021    DTaP/Tdap/Td vaccine (1 - Tdap) 09/14/2021 (Originally 3/19/1953)    Flu vaccine (Season Ended) 09/14/2021 (Originally 9/1/2021)    Shingles Vaccine (1 of 2) 09/14/2021 (Originally [birth year]/19/1984)    TSH testing  08/11/2021    Potassium monitoring  09/10/2021    Creatinine monitoring  09/10/2021    Pneumococcal 65+ years Vaccine  Completed    COVID-19 Vaccine  Completed    Hepatitis A vaccine  Aged Out    Hepatitis B vaccine  Aged Out    Hib vaccine  Aged Out    Meningococcal (ACWY) vaccine  Aged Out       Subjective:      Review of Systems   Constitutional: Positive for fatigue. Negative for chills and fever. HENT: Negative for congestion. Respiratory: Negative for cough, chest tightness and shortness of breath. Cardiovascular: Negative for chest pain, palpitations and leg swelling. Gastrointestinal: Negative for abdominal pain, anal bleeding, constipation, diarrhea and nausea. Genitourinary: Negative for difficulty urinating. Musculoskeletal: Positive for arthralgias, back pain, myalgias, neck pain and neck stiffness. Negative for gait problem and joint swelling. Psychiatric/Behavioral: Negative. SeeHPI for visit specific review of symptoms. All others negative      Objective:   /74   Pulse 68   Temp 97.2 °F (36.2 °C)   Ht 5' 3\" (1.6 m)   Wt 149 lb (67.6 kg)   SpO2 98%   BMI 26.39 kg/m²   Physical Exam  Constitutional:       Appearance: She is well-developed. She is not ill-appearing. Eyes:      Pupils: Pupils are equal, round, and reactive to light.    Cardiovascular:      Rate and Rhythm: Normal rate and regular rhythm. Heart sounds: No murmur heard. No friction rub. Pulmonary:      Effort: Pulmonary effort is normal. No respiratory distress. Breath sounds: Normal breath sounds. No wheezing or rales. Abdominal:      General: Bowel sounds are normal. There is no distension. Palpations: Abdomen is soft. Tenderness: There is no abdominal tenderness. There is no guarding or rebound. Musculoskeletal:      Cervical back: Normal range of motion and neck supple. Neurological:      Mental Status: She is alert. Psychiatric:         Behavior: Behavior normal.           No results found for this or any previous visit (from the past 672 hour(s)). Assessment & Plan: The following diagnoses and conditions are stable with no further orders unless indicated:  1. Annual physical exam  Discussed lifestyle changes such as diet and exercise. Recommended eliminate processed food from diet such as sugar and fried foods. Recommended exercising at least 150 minutes/week. Try to do full body resistance training twice a week as well. Offered suggestions for calorie counting such as phone apps and online resources such as My fitness pal and Lose it. Discussed healthy weight. 2. Recurrent major depressive disorder, in full remission ([facility] [address]  Stable chronic issue    3. Essential (primary) hypertension  BP Readings from Last 3 Encounters:   06/17/21 110/74   06/09/21 126/72   12/07/20 130/70     Stable chronic issue  - Comprehensive Metabolic Panel; Future    4. Primary hypothyroidism  Lab Results   Component Value Date    TSHFT4 0.57 08/02/2019    TSH 1.200 08/11/2020     Stable chronic issue  - T4, Free; Future  - TSH without Reflex; Future    5. Other fatigue    - CBC Auto Differential; Future    6. Lipid screening    - Lipid Panel; Future      Discussed her lower back issues today. Chronic issue but stable she is satisfied with using Tylenol as needed.   Offered referral back to room air

## 2021-06-17 NOTE — PATIENT INSTRUCTIONS
We are committed to providing you with the best care possible. In order to help us achieve these goals please remember to bring all medications, herbal products, and over the counter supplements with you to each visit. If your provider has ordered testing for you, please be sure to follow up with our office if you have not received results within 7 days after the testing took place. *If you receive a survey after visiting one of our offices, please take time to share your experience concerning your physician office visit. These surveys are confidential and no health information about you is shared. We are eager to improve for you and we are counting on your feedback to help make that happen.  _______________________________________________________________         Advance Care Planning: Care Instructions  Your Care Instructions    It can be hard to live with an illness that cannot be cured. But if your health is getting worse, you may want to make decisions about end-of-life care. Planning for the end of your life does not mean that you are giving up. It is a way to make sure that your wishes are met. Clearly stating your wishes can make it easier for your loved ones. Making plans while you are still able may also ease your mind and make your final days less stressful and more meaningful. Follow-up care is a key part of your treatment and safety. Be sure to make and go to all appointments, and call your doctor if you are having problems. It's also a good idea to know your test results and keep a list of the medicines you take. What can you do to plan for the end of life? · Visit:  https://ag.[place].gov/consumer-protection/livingwills  · You can bring these issues up with your doctor. You do not need to wait until your doctor starts the conversation. You might start with \"I would not be willing to live with . [place] [employer] \" When you complete this sentence it helps your doctor understand your wishes.   · Talk openly and honestly with your doctor. This is the best way to understand the decisions you will need to make as your health changes. Know that you can always change your mind. · Ask your doctor about commonly used life-support measures. These include tube feedings, breathing machines, and fluids given through a vein (IV). Understanding these treatments will help you decide whether you want them. · You may choose to have these life-supporting treatments for a limited time. This allows a trial period to see whether they will help you. You may also decide that you want your doctor to take only certain measures to keep you alive. It is important to spell out these conditions so that your doctor and family understand them. · Talk to your doctor about how long you are likely to live. He or she may be able to give you an idea of what usually happens with your specific illness. · Think about preparing papers that state your wishes. This way there will not be any confusion about what you want. You can change your instructions at any time. Which papers should you prepare? Advance directives are legal papers that tell doctors how you want to be cared for at the end of your life. You do not need a  to write these papers. Ask your doctor or your state health department for information on how to write your advance directives. They may have the forms for each of these types of papers. Make sure your doctor has a copy of these on file, and give a copy to a family member or close friend. · Consider a do-not-resuscitate order (DNR). This order asks that no extra treatments be done if your heart stops or you stop breathing. Extra treatments may include cardiopulmonary resuscitation (CPR), electrical shock to restart your heart, or a machine to breathe for you. If you decide to have a DNR order, ask your doctor to explain and write it. Place the order in your home where everyone can easily see it. · Consider a living will.  A living will explains your wishes about life support and other treatments at the end of your life if you become unable to speak for yourself. Living de leon tell doctors to use or not use treatments that would keep you alive. You must have one or two witnesses or a notary present when you sign this form. · Consider a durable power of  for health care. This allows you to name a person to make decisions about your care if you are not able to. Most people ask a close friend or family member. Talk to this person about the kinds of treatments you want and those that you do not want. Make sure this person understands your wishes. These legal papers are simple to change. Tell your doctor what you want to change, and ask him or her to make a note in your medical file. Give your family updated copies of the papers. Where can you learn more? Go to https://[email or web address] org and sign in to your [employer] account. Enter P184 in the [employer] box to learn more about \"Advance Care Planning: Care Instructions. \"     If you do not have an account, please click on the \"Sign Up Now\" link. Current as of: September 24, 2016  Content Version: 11.5  © 1291-1913 Healthwise, [employer]. Care instructions adapted under license by [facility] ([facility]). If you have questions about a medical condition or this instruction, always ask your healthcare professional. [place] [address] any warranty or liability for your use of this information. Learning About Mark Whatley  What is a living will? A living will is a legal form you use to write down the kind of care you want at the end of your life. It is used by the health professionals who will treat you if you aren't able to decide for yourself. If you put your wishes in writing, your loved ones and others will know what kind of care you want. They won't need to guess. This can ease your mind and be helpful to others.   A living will is not the same as an estate or property will. An estate will explains what you want to happen with your money and property after you die. Is a living will a legal document? A living will is a legal document. Each state has its own laws about living de leon. If you move to another state, make sure that your living will is legal in the state where you now live. Or you might use a universal form that has been approved by many states. This kind of form can sometimes be completed and stored online. Your electronic copy will then be available wherever you have a connection to the Internet. In most cases, doctors will respect your wishes even if you have a form from a different state. · You don't need an  to complete a living will. But legal advice can be helpful if your state's laws are unclear, your health history is complicated, or your family can't agree on what should be in your living will. · You can change your living will at any time. Some people find that their wishes about end-of-life care change as their health changes. · In addition to making a living will, think about completing a medical power of  form. This form lets you name the person you want to make end-of-life treatment decisions for you (your \"health care agent\") if you're not able to. Many hospitals and nursing homes will give you the forms you need to complete a living will and a medical power of . · Your living will is used only if you can't make or communicate decisions for yourself anymore. If you become able to make decisions again, you can accept or refuse any treatment, no matter what you wrote in your living will. · Your state may offer an online registry. This is a place where you can store your living will online so the doctors and nurses who need to treat you can find it right away. What should you think about when creating a living will? Talk about your end-of-life wishes with your family members and your doctor.  Let them your medical care. And then some medical professionals who may not know you as well might have to make decisions for you. In some cases, a  makes the decisions. When you name a health care agent, it is very clear who has the power to make health decisions for you. How do you name a health care agent? You name your health care agent on a legal form. It is usually called a durable power of  for health care. Ask your hospital, state bar association, or office on aging where to find these forms. You must sign the form to make it legal. Some states require you to get the form notarized. This means that a person called a  watches you sign the form and then he or she signs the form. Some states also require that two or more witnesses sign the form. Be sure to tell your family members and doctors who your health care agent is. Keep your forms in a safe place. But make sure that your loved ones know where the forms are. This could be in your desk where you keep other important papers. Make sure your doctor has a copy of your forms. Where can you learn more? Go to https://chpepiceweb.[email or web address]. org and sign in to your [employer] account. Enter [id number] in the [employer] box to learn more about \"Learning About Durable Power of  for Health Care. \"     If you do not have an account, please click on the \"Sign Up Now\" link. Current as of: September 24, 2016  Content Version: 11.5  © 1834-0606 Healthwise, Incorporated. Care instructions adapted under license by [facility] ([facility]). If you have questions about a medical condition or this instruction, always ask your healthcare professional. [place] [address] any warranty or liability for your use of this information. _______________________________________________________________    Home Safety Tips    Each year, thousands of older Americans fall at home. Many of them are seriously injured, and some are disabled.  In , more than 8,500 people over age 72  because of falls. Falls are often due to hazards that are easy to overlook but easy to fix. This checklist will help you find and fix those hazards in your home. The checklist asks about hazards found in each room of your home. For each hazard, the checklist tells you how to fix the problem. At the end of the checklist, you will find other tips for preventing falls. o Look at the floor in each room  o When he walks through room do you have to walk around furniture? - Ask someone to move the furniture to clear your past  o You have throw rugs on the floor? - Remove the rugs or use double-sided tape or nonslip backing on the rugs so they dont slip  o Are papers, magazines, books, shoes, boxes, blankets, towels, or other objects on the floor?  -  things that are on the floor. Always keep objects off the floor  o Do you have to walk over or around cords or wires (like cords from lamps, extension cords, or telephone cords)? - Coil or tape cords and wires next to the wall so you cant trip over them. Have an  put in another outlet. o Are papers, shoes, books, or other objects on the stairs? -  things on the stairs. Always keep objects off the stairs. o Are some steps broken or uneven?   - Fix loose or uneven steps. o Are you missing a light over the stairway?   - Have a  or an  put in an overhead light at the top and bottom of the stairs. o Has the stairway light bulb burned out?   - Have a friend or family member change the light bulb.   o Do you have only one light switch for your stairs (only at the top or at the bottom of the stairs)? - Have a  or an  put in a light switch at the top and bottom of the stairs. You can get light switches that glow  o Are the handrails loose or broken? Is there a handrail on only one side of the stairs? - Fix loose handrails or put in new ones.  Make sure handrails are on both sides of the stairs and are as long as the stairs. o Is the carpet on the steps loose or torn? - Make sure the carpet is firmly attached to every step or remove the carpet and attach non-slip rubber treads on the stairs. o Look at your kitchen and eating Look at your kitchen and eating area. Are the things you use often on high shelves? - Move items in your cabinets. Keep things you use often on the lower shelves (about waist high). - Is your step stool unsteady? - Get a new, steady step stool with a bar to hold on to. Never use a chair as a step stool.   - Is the light near the bed hard to reach?   - Place a lamp close to the bed where it is easy to reach.  o Is the path from your bed to the bathroom dark?   - Put in a night-light so you can see where youre walking. Some nightlights go on by themselves after dark  o Is the tub or shower floor slippery?   - Put a non-slip rubber mat or selfstick strips on the floor of the tub or shower. o Do you have some support when you get in and out of the tub or up from the toilet?   - Have a  or a paz put in a grab bar inside the tub and next to the toilet.  o Exercise regularly. Exercise makes you stronger and improves your balance and coordination. o Have your doctor or pharmacist look at all the medicines you take, even over-the-counter medicines. Some medicines can make you sleepy or dizzy. o Have your vision checked at least once a year by an eye doctor. Poor vision can increase your risk of falling.   o Get up slowly after you sit or lie down.  o Wear sturdy shoes with thin, non-slip soles. Avoid slippers and running shoes with thick soles. o Improve the lighting in your home. Use brighter light bulbs (at least 60 massey). Use lamp shades or frosted bulbs to reduce glare. o Use reflecting tape at the top and bottom of the stairs so you can see them better.    o Paint doorsills a different color to prevent tripping.  o Keep emergency numbers in large print near each phone. o Put a phone near the floor in case you fall and cant get up.   o Think about wearing an alarm device that will bring help in case you fall and cant get up.    www.cdc.gov/safeusa    _______________________________________________________________    Go to room 201 for labs prior to next visit. Be sure to fast for at least 12 hours prior to laboratory appointment. Would recommend getting labs about 1 week prior to the appointment time to ensure they are available at the time of the appointment. No appointment is necessary for laboratory work as the orders have already been placed.     Lab opens at 6:30 am and closes at 5:00 pm.

## 2021-06-17 NOTE — PROGRESS NOTES
Medicare Annual Wellness Visit - Subsequent    Name: Mesfin Heredia Date: 2021   MRN: [id number] Sex: Female   Age: 80 y.o. Ethnicity: Non-/Non    : 1934 Race: Juvenal Faulkner is here for   Chief Complaint   Patient presents with   [facility]        Screenings for behavioral, psychosocial and functional/safety risks, and cognitive dysfunction are all negative except as indicated below. These results, as well as other patient data from the [address] form, are documented in Flowsheets linked to this Encounter. Allergies   Allergen Reactions    Demerol Other (See Comments)     Abdominal spasms with severe n/v/    Meperidine Other (See Comments)    Nsaids Other (See Comments)     Dark tarry stools    Stadol [Butorphanol Tartrate]     Sulfa Antibiotics        Prior to Visit Medications    Medication Sig Taking?  Authorizing Provider   metoprolol tartrate (LOPRESSOR) 50 MG tablet Take 1 tablet by mouth twice daily Yes Radha Mariano MD   lisinopril (PRINIVIL;ZESTRIL) 20 MG tablet Take 2 tablets by mouth once daily Yes Radha Mariano MD   SYNTHROID 50 MCG tablet Take 1 tablet by mouth Daily Yes Radha Mariano MD   triamterene-hydrochlorothiazide (MAXZIDE-25) 37.5-25 MG per tablet Take 1 tablet by mouth daily as needed Yes Historical Provider, MD   Coenzyme Q10 (COQ10) 200 MG CAPS Take 1 tablet by mouth daily Yes Historical Provider, MD   Cholecalciferol (VITAMIN D3) 2000 units TABS Take by mouth Yes Historical Provider, MD   Probiotic Product (PROBIOTIC-10 PO) Take by mouth Yes Historical Provider, MD   NONFORMULARY Take 1 tablet by mouth daily Indications: focus factor Yes Historical Provider, MD   NONFORMULARY  Yes Historical Provider, MD   Multiple Vitamin (MULTIVITAMIN PO) Take 1 tablet by mouth daily  Yes Historical Provider, MD   DiphenhydrAMINE HCl (BENADRYL ALLERGY PO) Take 1 tablet by mouth daily as needed Indications: Seasonal Allergy Health     Financial Resource Strain:     Difficulty of Paying Living Expenses:    Food Insecurity:     Worried About Running Out of Food in the Last Year:     [address] in the Last Year:    Transportation Needs:     Lack of Transportation (Medical):  Lack of Transportation (Non-Medical):    Physical Activity:     Days of Exercise per Week:     Minutes of Exercise per Session:    Stress:     Feeling of Stress :    Social Connections:     Frequency of Communication with Friends and Family:     Frequency of Social Gatherings with Friends and Family:     Attends Sikhism Services:     Active Member of Clubs or Organizations:     Attends Club or Organization Meetings:     Marital Status:    Intimate Partner Violence:     Fear of Current or Ex-Partner:     Emotionally Abused:     Physically Abused:     Sexually Abused: Audit Questionnaire: Screen for Alcohol Misuse  How often do you have a drink containing alcohol?: Never  Substance Abuse Interventions:  · Not indicated     Health Risk Assessment:     General  In general, how would you say your health is?: Good  In the past 7 days, have you experienced any of the following? New or Increased Pain, New or Increased Fatigue, Loneliness, Social Isolation, Stress or Anger?: (!) New or Increased Fatigue  Do you get the social and emotional support that you need?: Yes  Do you have a Living Will?: Yes  General Health Risk Interventions:  · Not indicated     Health Habits/Nutrition  Do you exercise for at least 20 minutes 2-3 times per week?: (!) No  Have you lost any weight without trying in the past 3 months?: No  Do you eat only one meal per day?: No  Have you seen the dentist within the past year?: (!) No  Body mass index is 26.39 kg/m². Health Habits/Nutrition Interventions:  Recommended at least 150 minutes of exercise per week and resistance training 2 days a week. Discussed healthy diet habits. Offered suggestions for calorie counting.   · Vaccine  Completed    Hepatitis A vaccine  Aged Out    Hepatitis B vaccine  Aged Out    Hib vaccine  Aged Out    Meningococcal (ACWY) vaccine  Aged Out       Recommendations for Clixtr Due: see orders.   Recommended screening schedule for the next 5-10 years is provided to the patient in written form: see Patient Instructions/AVS.

## 2021-06-22 ASSESSMENT — ENCOUNTER SYMPTOMS
DIARRHEA: 0
NAUSEA: 0
ABDOMINAL PAIN: 0
SHORTNESS OF BREATH: 0
ANAL BLEEDING: 0
CONSTIPATION: 0
BACK PAIN: 1
CHEST TIGHTNESS: 0
COUGH: 0

## 2021-06-28 DIAGNOSIS — Z13.220 LIPID SCREENING: ICD-10-CM

## 2021-06-28 DIAGNOSIS — R35.0 URINARY FREQUENCY: ICD-10-CM

## 2021-06-28 DIAGNOSIS — R53.83 OTHER FATIGUE: ICD-10-CM

## 2021-06-28 DIAGNOSIS — I10 ESSENTIAL (PRIMARY) HYPERTENSION: ICD-10-CM

## 2021-06-28 DIAGNOSIS — E03.9 PRIMARY HYPOTHYROIDISM: ICD-10-CM

## 2021-06-28 LAB
ALBUMIN SERPL-MCNC: 4 G/DL (ref 3.5–5.2)
ALP BLD-CCNC: 89 U/L (ref 35–104)
ALT SERPL-CCNC: 13 U/L (ref 5–33)
ANION GAP SERPL CALCULATED.3IONS-SCNC: 7 MMOL/L (ref 7–19)
AST SERPL-CCNC: 20 U/L (ref 5–32)
BACTERIA: NEGATIVE /HPF
BASOPHILS ABSOLUTE: 0.1 K/UL (ref 0–0.2)
BASOPHILS RELATIVE PERCENT: 0.9 % (ref 0–1)
BILIRUB SERPL-MCNC: 0.4 MG/DL (ref 0.2–1.2)
BILIRUBIN URINE: NEGATIVE
BLOOD, URINE: NEGATIVE
BUN BLDV-MCNC: 16 MG/DL (ref 8–23)
CALCIUM SERPL-MCNC: 10 MG/DL (ref 8.8–10.2)
CHLORIDE BLD-SCNC: 102 MMOL/L (ref 98–111)
CHOLESTEROL, TOTAL: 192 MG/DL (ref 160–199)
CLARITY: CLEAR
CO2: 30 MMOL/L (ref 22–29)
COLOR: YELLOW
CREAT SERPL-MCNC: 1 MG/DL (ref 0.5–0.9)
CRYSTALS, UA: ABNORMAL /HPF
EOSINOPHILS ABSOLUTE: 0.2 K/UL (ref 0–0.6)
EOSINOPHILS RELATIVE PERCENT: 2.3 % (ref 0–5)
EPITHELIAL CELLS, UA: 3 /HPF (ref 0–5)
GFR AFRICAN AMERICAN: >59
GFR NON-AFRICAN AMERICAN: 52
GLUCOSE BLD-MCNC: 103 MG/DL (ref 74–109)
GLUCOSE URINE: NEGATIVE MG/DL
HCT VFR BLD CALC: 39.9 % (ref 37–47)
HDLC SERPL-MCNC: 44 MG/DL (ref 65–121)
HEMOGLOBIN: 12.7 G/DL (ref 12–16)
HYALINE CASTS: 3 /HPF (ref 0–8)
IMMATURE GRANULOCYTES #: 0 K/UL
KETONES, URINE: ABNORMAL MG/DL
LDL CHOLESTEROL CALCULATED: 124 MG/DL
LEUKOCYTE ESTERASE, URINE: ABNORMAL
LYMPHOCYTES ABSOLUTE: 1.8 K/UL (ref 1.1–4.5)
LYMPHOCYTES RELATIVE PERCENT: 23.1 % (ref 20–40)
MCH RBC QN AUTO: 28.5 PG (ref 27–31)
MCHC RBC AUTO-ENTMCNC: 31.8 G/DL (ref 33–37)
MCV RBC AUTO: 89.7 FL (ref 81–99)
MONOCYTES ABSOLUTE: 0.7 K/UL (ref 0–0.9)
MONOCYTES RELATIVE PERCENT: 9.5 % (ref 0–10)
NEUTROPHILS ABSOLUTE: 5 K/UL (ref 1.5–7.5)
NEUTROPHILS RELATIVE PERCENT: 63.8 % (ref 50–65)
NITRITE, URINE: NEGATIVE
PDW BLD-RTO: 16.3 % (ref 11.5–14.5)
PH UA: 6.5 (ref 5–8)
PLATELET # BLD: 291 K/UL (ref 130–400)
PMV BLD AUTO: 10.9 FL (ref 9.4–12.3)
POTASSIUM SERPL-SCNC: 4.6 MMOL/L (ref 3.5–5)
PROTEIN UA: 30 MG/DL
RBC # BLD: 4.45 M/UL (ref 4.2–5.4)
RBC UA: 3 /HPF (ref 0–4)
SODIUM BLD-SCNC: 139 MMOL/L (ref 136–145)
SPECIFIC GRAVITY UA: 1.02 (ref 1–1.03)
T4 FREE: 1.28 NG/DL (ref 0.93–1.7)
TOTAL PROTEIN: 6.6 G/DL (ref 6.6–8.7)
TRIGL SERPL-MCNC: 119 MG/DL (ref 0–149)
TSH SERPL DL<=0.05 MIU/L-ACNC: 0.57 UIU/ML (ref 0.27–4.2)
UROBILINOGEN, URINE: 1 E.U./DL
WBC # BLD: 7.8 K/UL (ref 4.8–10.8)
WBC UA: 2 /HPF (ref 0–5)

## 2021-07-02 RX ORDER — LISINOPRIL 20 MG/1
TABLET ORAL
Qty: 180 TABLET | Refills: 3 | Status: SHIPPED | OUTPATIENT
Start: 2021-07-02 | End: 2022-08-26

## 2021-07-23 NOTE — PROGRESS NOTES
HISTORY OF PRESENT ILLNESS:    Ms. Nadege Troy is a 80 y.o. white female who presents today for a 6 month breast exam following a unilateral ultrasound. She has a personal history of Left Breast Cancer. She underwent bilateral mastectomy and reconstruction for a left-sided cancer in 1973. She is unsure exactly what she had and says no lymph glands were taken at that time. 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 This was done at [facility].  We will try to track these records down. Her problem now is some peculiar pains in her left axilla that felt like when she had her cancer. She has a family history of breast cancer in her Daughter whom had a bilateral breast Mastectomy. She is post menopausal and is not on HRT.      1/27/2021-Unilateral Left Ultrasound  FINDINGS:  No mass lesion or adenopathy. No inflammatory changes, fluid   collection or abnormal skin thickening. IMPRESSION AND RECOMMENDATION:    No sonographic evidence of malignancy. No adenopathy. Normal exam.    BI-RADS CATEGORY 1: NEGATIVE   Signed by Dr Wilver Chun on 1/27/2021 2:47 PM       I reviewed the images and real-time with the technologist.  I do not see anything worrisome for malignancy. There is a normal fatty replaced lymph node but no other worrisome findings in the axilla whatsoever. Unilateral Left Ultrasound-7/28/2021  FINDINGS:   Targeted physician performed physical exam of the left axilla. No  suspicious palpable finding. No skin changes. Targeted ultrasound of the left axilla. No adenopathy or suspicious  finding. IMPRESSION:  No ultrasonographic evidence of malignancy or lymphadenopathy. Recommend clinical follow-up. Results discussed with the patient in person at the conclusion of the  exam.   BI-RADS CATEGORY 1: NEGATIVE.       BREAST EXAM:    Emily Castillo has a reconstructed breast on the right with no palpable masses. On the left she has a mastectomy scar with a lot of redundant skin but no palpable masses. I do not palpate anything worrisome in the left axilla. IMPRESSION: Previous breast cancer                          Doubt axillary recurrence    PLAN: I will see her back in the office for exam in six months. She will call us with any new concerns. I have seen, examined and reviewed this patient medication list, appropriate labs and imaging studies. I reviewed relevant medical records and others physicians notes. I discussed the plans of care with the patient. I answered all the questions to the patients satisfaction. I, Dr Sharan Mendez, personally performed the services described in this documentation as scribed by Tabatha Carvalho MA in my presence and is both accurate and complete. (Please note that portions of this note were completed with a voice recognition program. Efforts were made to edit the dictations but occasionally words are mis-transcribed.)  Over 50% of the total visit time of 20 minutes in face to face encounter with the patient, out of which more than 50% of the time was spent in counseling patient or family and coordination of care. Counseling included but was not limited to time spent reviewing labs, imaging studies/ treatment plan and answering questions.

## 2021-07-28 ENCOUNTER — OFFICE VISIT (OUTPATIENT)
Dept: SURGERY | Age: 86
End: 2021-07-28
Payer: MEDICARE

## 2021-07-28 ENCOUNTER — HOSPITAL ENCOUNTER (OUTPATIENT)
Dept: WOMENS IMAGING | Age: 86
Discharge: HOME | End: 2021-07-28
Payer: MEDICARE

## 2021-07-28 VITALS — SYSTOLIC BLOOD PRESSURE: 138 MMHG | HEART RATE: 80 BPM | DIASTOLIC BLOOD PRESSURE: 84 MMHG

## 2021-07-28 DIAGNOSIS — Z85.3 PERSONAL HISTORY OF BREAST CANCER: Primary | ICD-10-CM

## 2021-07-28 DIAGNOSIS — Z85.3 PERSONAL HISTORY OF BREAST CANCER: ICD-10-CM

## 2021-07-28 PROCEDURE — 1036F TOBACCO NON-USER: CPT | Performed by: SURGERY

## 2021-07-28 PROCEDURE — 76642 ULTRASOUND BREAST LIMITED: CPT

## 2021-07-28 PROCEDURE — 1123F ACP DISCUSS/DSCN MKR DOCD: CPT | Performed by: SURGERY

## 2021-07-28 PROCEDURE — 1090F PRES/ABSN URINE INCON ASSESS: CPT | Performed by: SURGERY

## 2021-07-28 PROCEDURE — G8417 CALC BMI ABV UP PARAM F/U: HCPCS | Performed by: SURGERY

## 2021-07-28 PROCEDURE — 4040F PNEUMOC VAC/ADMIN/RCVD: CPT | Performed by: SURGERY

## 2021-07-28 PROCEDURE — G8427 DOCREV CUR MEDS BY ELIG CLIN: HCPCS | Performed by: SURGERY

## 2021-07-28 PROCEDURE — 99213 OFFICE O/P EST LOW 20 MIN: CPT | Performed by: SURGERY

## 2021-08-03 RX ORDER — METOPROLOL TARTRATE 50 MG/1
TABLET, FILM COATED ORAL
Qty: 180 TABLET | Refills: 0 | Status: SHIPPED | OUTPATIENT
Start: 2021-08-03 | End: 2021-11-08

## 2021-08-03 NOTE — TELEPHONE ENCOUNTER
Simijose Damaris called to request a refill on her medication.       Last office visit : 6/17/2021   Next office visit : 12/20/2021     Requested Prescriptions     Pending Prescriptions Disp Refills    metoprolol tartrate (LOPRESSOR) 50 MG tablet [Pharmacy Med Name: Metoprolol Tartrate 50 MG Oral Tablet] 180 tablet 0     Sig: Take 1 tablet by mouth twice daily            Catrachito Wise MA

## 2021-11-08 RX ORDER — METOPROLOL TARTRATE 50 MG/1
TABLET, FILM COATED ORAL
Qty: 180 TABLET | Refills: 0 | Status: SHIPPED | OUTPATIENT
Start: 2021-11-08 | End: 2022-03-07

## 2021-11-08 RX ORDER — LEVOTHYROXINE SODIUM 50 MCG
TABLET ORAL
Qty: 30 TABLET | Refills: 0 | Status: SHIPPED | OUTPATIENT
Start: 2021-11-08 | End: 2021-12-07

## 2021-12-07 RX ORDER — LEVOTHYROXINE SODIUM 50 MCG
TABLET ORAL
Qty: 30 TABLET | Refills: 0 | Status: SHIPPED | OUTPATIENT
Start: 2021-12-07 | End: 2022-01-11

## 2021-12-07 NOTE — TELEPHONE ENCOUNTER
Jojo Dixon called requesting a refill of the below medication which has been pended for you:     Requested Prescriptions     Pending Prescriptions Disp Refills    SYNTHROID 50 MCG tablet [Pharmacy Med Name: Synthroid 50 MCG Oral Tablet] 30 tablet 0     Sig: Take 1 tablet by mouth once daily       Last Appointment Date: 6/17/2021  Next Appointment Date: 12/20/2021    Allergies   Allergen Reactions    Demerol Other (See Comments)     Abdominal spasms with severe n/v/    Meperidine Other (See Comments)    Nsaids Other (See Comments)     Dark tarry stools    Stadol [Butorphanol Tartrate]     Sulfa Antibiotics

## 2022-01-21 ENCOUNTER — OFFICE VISIT (OUTPATIENT)
Dept: FAMILY MEDICINE CLINIC | Age: 87
End: 2022-01-21
Payer: MEDICARE

## 2022-01-21 VITALS
OXYGEN SATURATION: 99 % | HEIGHT: 63 IN | TEMPERATURE: 97.6 F | HEART RATE: 76 BPM | WEIGHT: 141 LBS | DIASTOLIC BLOOD PRESSURE: 88 MMHG | SYSTOLIC BLOOD PRESSURE: 136 MMHG | BODY MASS INDEX: 24.98 KG/M2

## 2022-01-21 DIAGNOSIS — E03.9 PRIMARY HYPOTHYROIDISM: Primary | ICD-10-CM

## 2022-01-21 DIAGNOSIS — R53.83 OTHER FATIGUE: ICD-10-CM

## 2022-01-21 DIAGNOSIS — I10 ESSENTIAL (PRIMARY) HYPERTENSION: ICD-10-CM

## 2022-01-21 DIAGNOSIS — F33.42 RECURRENT MAJOR DEPRESSIVE DISORDER, IN FULL REMISSION (HCC): ICD-10-CM

## 2022-01-21 DIAGNOSIS — Z13.220 LIPID SCREENING: ICD-10-CM

## 2022-01-21 DIAGNOSIS — I47.1 PAROXYSMAL SVT (SUPRAVENTRICULAR TACHYCARDIA) (HCC): ICD-10-CM

## 2022-01-21 PROCEDURE — 1090F PRES/ABSN URINE INCON ASSESS: CPT | Performed by: FAMILY MEDICINE

## 2022-01-21 PROCEDURE — G8484 FLU IMMUNIZE NO ADMIN: HCPCS | Performed by: FAMILY MEDICINE

## 2022-01-21 PROCEDURE — 4040F PNEUMOC VAC/ADMIN/RCVD: CPT | Performed by: FAMILY MEDICINE

## 2022-01-21 PROCEDURE — G8420 CALC BMI NORM PARAMETERS: HCPCS | Performed by: FAMILY MEDICINE

## 2022-01-21 PROCEDURE — G8427 DOCREV CUR MEDS BY ELIG CLIN: HCPCS | Performed by: FAMILY MEDICINE

## 2022-01-21 PROCEDURE — 1036F TOBACCO NON-USER: CPT | Performed by: FAMILY MEDICINE

## 2022-01-21 PROCEDURE — 1123F ACP DISCUSS/DSCN MKR DOCD: CPT | Performed by: FAMILY MEDICINE

## 2022-01-21 PROCEDURE — 99214 OFFICE O/P EST MOD 30 MIN: CPT | Performed by: FAMILY MEDICINE

## 2022-01-21 RX ORDER — LEVOTHYROXINE SODIUM 0.05 MG/1
50 TABLET ORAL DAILY
Qty: 90 TABLET | Refills: 1 | Status: SHIPPED | OUTPATIENT
Start: 2022-01-21 | End: 2022-08-26

## 2022-01-21 NOTE — PROGRESS NOTES
Former[facility] PHYSICIAN SERVICES  [facility] FAMILY MEDICINE  [address] [address]  Dept: [phone]  Dept Fax: : [phone]    Jeff Palomo is a 80 y.o. female who presents today for her medical conditions/complaints as noted below. Jeff Palomo is here for Follow-up        HPI:   CC: Here today to discuss the following:      Hypertension  Compliant with medications. No adverse effects from medication. No lightheadedness, palpitations, or chest pain. Hypothyroidism  Symptoms are stable. No temperature intolerance, fatigue, or mood disturbance from baseline. Complaint with current medication.          HPI    Past Medical History:   Diagnosis Date    Breast cyst     Cancer [facility])     surgery only    Colon polyp     Family history of colon cancer 6/17/2016    GERD (gastroesophageal reflux disease)     History of double vision     with surgical correction    Hypertension     Thyroid disease       Past Surgical History:   Procedure Laterality Date    ANKLE FRACTURE SURGERY Right     BACK SURGERY      plating    BREAST RECONSTRUCTION      césar. mastectomy with replacement    COLONOSCOPY  04/19/2007    Dr. Neyda Silva ([facility]) N/A 5/25/2016    Dr Browning Staff non-obstructing simple appearing pancreatic cyst of 2.2 x 1.9mm-no high risk features were seen    EYE SURGERY      recent    HYSTERECTOMY      MA TOTAL KNEE ARTHROPLASTY Left 10/17/2017    KNEE TOTAL ARTHROPLASTY performed by Mellisa Campuzano MD at [address]       UPPER GASTROINTESTINAL ENDOSCOPY N/A 5/25/2016    Dr Regina Bhatia-gastritis/gastropathy       Family History   Problem Relation Age of Onset    Colon Cancer Mother     Colon Polyps Mother     Rectal Cancer Mother 80    Breast Cancer Daughter 64        Had bilateral mastectomy    Esophageal Cancer Neg Hx     Liver Disease Neg Hx     Lung Cancer Neg Hx     Stomach Cancer Neg Hx        Social History     Tobacco Use    Smoking status: Former Smoker     Packs/day: 1.00     Years: 17.00     Pack years: 17.00     Quit date: 1985     Years since quittin.6    Smokeless tobacco: Never Used   Substance Use Topics    Alcohol use: Yes     Comment: rare     Current Outpatient Medications   Medication Sig Dispense Refill    levothyroxine (SYNTHROID) 50 MCG tablet Take 1 tablet by mouth daily 90 tablet 1    metoprolol tartrate (LOPRESSOR) 50 MG tablet Take 1 tablet by mouth twice daily 180 tablet 0    lisinopril (PRINIVIL;ZESTRIL) 20 MG tablet Take 2 tablets by mouth once daily 180 tablet 3    TURMERIC PO Take 1,000 mg by mouth daily       Coenzyme Q10 (COQ10) 200 MG CAPS Take 1 tablet by mouth daily      Cholecalciferol (VITAMIN D3) 2000 units TABS Take by mouth      Probiotic Product (PROBIOTIC-10 PO) Take by mouth      NONFORMULARY Take 1 tablet by mouth daily Indications: focus factor      docusate sodium (COLACE) 100 MG capsule Take 1 capsule by mouth daily To prevent constipation 20 capsule 0    Multiple Vitamin (MULTIVITAMIN PO) Take 1 tablet by mouth daily       DiphenhydrAMINE HCl (BENADRYL ALLERGY PO) Take 1 tablet by mouth daily as needed Indications: Seasonal Allergy       triamterene-hydrochlorothiazide (MAXZIDE-25) 37.5-25 MG per tablet Take 1 tablet by mouth daily as needed (Patient not taking: Reported on 2022)      NONFORMULARY  (Patient not taking: Reported on 2022)       No current facility-administered medications for this visit.      Allergies   Allergen Reactions    Demerol Other (See Comments)     Abdominal spasms with severe n/v/    Meperidine Other (See Comments)    Nsaids Other (See Comments)     Dark tarry stools    Stadol [Butorphanol Tartrate]     Sulfa Antibiotics        Health Maintenance   Topic Date Due    DTaP/Tdap/Td vaccine (1 - Tdap) Never done    Shingles Vaccine (1 of 2) Never done    Flu vaccine (1) 01/21/2023 (Originally 9/1/2021)    Depression Monitoring  06/17/2022    Annual Wellness Visit (AWV)  06/18/2022    TSH testing  06/28/2022    Potassium monitoring  06/28/2022    Creatinine monitoring  06/28/2022    Pneumococcal 65+ years Vaccine  Completed    COVID-19 Vaccine  Completed    Hepatitis A vaccine  Aged Out    Hepatitis B vaccine  Aged Out    Hib vaccine  Aged Out    Meningococcal (ACWY) vaccine  Aged Out       Subjective:      Review of Systems    Review of Systems   Constitutional: Negative for chills and fever. HENT: Negative for congestion. Respiratory: Negative for cough, chest tightness and shortness of breath. Cardiovascular: Negative for chest pain, palpitations and leg swelling. Gastrointestinal: Negative for abdominal pain, anal bleeding, constipation, diarrhea and nausea. Genitourinary: Negative for difficulty urinating. Psychiatric/Behavioral: Negative. SeeHPI for visit specific review of symptoms. All others negative      Objective:   /88   Pulse 76   Temp 97.6 °F (36.4 °C)   Ht 5' 3\" (1.6 m)   Wt 141 lb (64 kg)   SpO2 99%   BMI 24.98 kg/m²   Physical Exam    Physical Exam   Constitutional: She appears well-developed. Does not appear ill. Neck: Normal range of motion. Neck supple. No masses. Neck Symmetric. Normal tracheal position. No thyroid enlargement  Cardiovascular: Normal rate and regular rhythm. Exam reveals no friction rub. Carotid arteries: no bruit observed. No murmur heard. Respiratory:  Effort normal and breath sounds normal. No respiratory distress. No wheezes. No rales. No use of accessory muscles or intercostal retractions. Neurological: alert. Psychiatric: normal mood and affect. Her behavior is normal. Normal judgement and insight observed. No results found for this or any previous visit (from the past 672 hour(s)). Assessment & Plan:         The following diagnoses and conditions are stable with no further orders unless indicated:  1. Primary hypothyroidism  Lab Results   Component Value Date    TSH 0.573 06/28/2021    T4FREE 1.28 06/28/2021     Stable  - levothyroxine (SYNTHROID) 50 MCG tablet; Take 1 tablet by mouth daily  Dispense: 90 tablet; Refill: 1  - TSH without Reflex; Future  - T4, Free; Future    2. Recurrent major depressive disorder, in full remission ([facility] [address]  Mood is stable    3. Paroxysmal SVT (supraventricular tachycardia) (HCC)  Stable on metoprolol    4. Lipid screening    - Lipid Panel; Future    5. Other fatigue    - CBC Auto Differential; Future  - Comprehensive Metabolic Panel; Future    6. Essential (primary) hypertension  BP Readings from Last 3 Encounters:   01/21/22 136/88   07/28/21 138/84   06/17/21 110/74     Stable            No follow-ups on file. Discussed use, benefit, and side effects of prescribed medications. All patient questions answered. Pt voiced understanding. Reviewed health maintenance. Instructedto continue current medications, diet and exercise. Patient agreed with treatmentplan. Follow up as directed. Note dictated using Dragon Dictation software  Sometimes this dictation software makes erroneous transcriptions.

## 2022-01-27 NOTE — PROGRESS NOTES
HISTORY OF PRESENT ILLNESS:    Ms.  [facility] is a 80 y.o. white female who presents with an abnormal Imaging of left axilla. She has a personal history of Left Breast Cancer. She underwent bilateral mastectomy and reconstruction for a left-sided cancer in 1973. She is unsure exactly what she had and says no lymph glands were taken at that time. 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 This was done at [facility].  We will try to track these records down. Her problem now is some peculiar pains in her left axilla that felt like when she had her cancer. She has a family history of breast cancer in her Daughter whom had a bilateral breast Mastectomy. She is post menopausal and is not on HRT.      1/27/2021-Unilateral Left Ultrasound  FINDINGS:  No mass lesion or adenopathy. No inflammatory changes, fluid   collection or abnormal skin thickening. IMPRESSION AND RECOMMENDATION:    No sonographic evidence of malignancy. No adenopathy. Normal exam.    BI-RADS CATEGORY 1: NEGATIVE   Signed by Dr Rossy Bautista on 1/27/2021 2:47 PM       I reviewed the images and real-time with the technologist.  I do not see anything worrisome for malignancy. There is a normal fatty replaced lymph node but no other worrisome findings in the axilla whatsoever. BREAST EXAM:    Mike Altamirano has a reconstructed breast on the right with no palpable masses. On the left she has a mastectomy scar with a lot of redundant skin but no palpable masses. I do not palpate anything worrisome in the left axilla. IMPRESSION: Previous breast cancer                          Doubt axillary recurrence    PLAN: Return in 1 year with a unilateral left axillary ultrasound with exam. She will call with any concerns.      I have seen, examined and reviewed this patient medication list, appropriate labs and imaging studies. I reviewed relevant medical records and others physicians notes. I discussed the plans of care with the patient. I answered all the questions to the patients satisfaction. I, Dr Wil Chun, personally performed the services described in this documentation as scribed by Celina Spear MA in my presence and is both accurate and complete. (Please note that portions of this note were completed with a voice recognition program. Efforts were made to edit the dictations but occasionally words are mis-transcribed.)  Over 50% of the total visit time of 20 minutes in face to face encounter with the patient, out of which more than 50% of the time was spent in counseling patient or family and coordination of care. Counseling included but was not limited to time spent reviewing labs, imaging studies/ treatment plan and answering questions.

## 2022-01-31 ENCOUNTER — OFFICE VISIT (OUTPATIENT)
Dept: SURGERY | Age: 87
End: 2022-01-31
Payer: MEDICARE

## 2022-01-31 VITALS
OXYGEN SATURATION: 96 % | HEIGHT: 63 IN | BODY MASS INDEX: 24.8 KG/M2 | HEART RATE: 89 BPM | TEMPERATURE: 98.4 F | WEIGHT: 140 LBS

## 2022-01-31 DIAGNOSIS — Z85.3 PERSONAL HISTORY OF BREAST CANCER: Primary | ICD-10-CM

## 2022-01-31 PROCEDURE — 1090F PRES/ABSN URINE INCON ASSESS: CPT | Performed by: SURGERY

## 2022-01-31 PROCEDURE — 1123F ACP DISCUSS/DSCN MKR DOCD: CPT | Performed by: SURGERY

## 2022-01-31 PROCEDURE — 1036F TOBACCO NON-USER: CPT | Performed by: SURGERY

## 2022-01-31 PROCEDURE — G8427 DOCREV CUR MEDS BY ELIG CLIN: HCPCS | Performed by: SURGERY

## 2022-01-31 PROCEDURE — G8484 FLU IMMUNIZE NO ADMIN: HCPCS | Performed by: SURGERY

## 2022-01-31 PROCEDURE — 99213 OFFICE O/P EST LOW 20 MIN: CPT | Performed by: SURGERY

## 2022-01-31 PROCEDURE — G8420 CALC BMI NORM PARAMETERS: HCPCS | Performed by: SURGERY

## 2022-01-31 PROCEDURE — 4040F PNEUMOC VAC/ADMIN/RCVD: CPT | Performed by: SURGERY

## 2022-02-08 DIAGNOSIS — Z85.3 PERSONAL HISTORY OF BREAST CANCER: Primary | ICD-10-CM

## 2022-02-08 DIAGNOSIS — Z90.12 S/P LEFT MASTECTOMY: ICD-10-CM

## 2022-02-10 ENCOUNTER — TELEPHONE (OUTPATIENT)
Dept: CARDIOLOGY CLINIC | Age: 87
End: 2022-02-10

## 2022-02-10 NOTE — TELEPHONE ENCOUNTER
Called to remind patient to get fasting labwork done before appt with Jenniffer Ahmadi on Monday at [address] [facility]. Voiced understanding.

## 2022-02-11 ENCOUNTER — TELEPHONE (OUTPATIENT)
Dept: CARDIOLOGY CLINIC | Age: 87
End: 2022-02-11

## 2022-02-14 ENCOUNTER — OFFICE VISIT (OUTPATIENT)
Dept: CARDIOLOGY CLINIC | Age: 87
End: 2022-02-14
Payer: MEDICARE

## 2022-02-14 VITALS
OXYGEN SATURATION: 100 % | BODY MASS INDEX: 24.8 KG/M2 | SYSTOLIC BLOOD PRESSURE: 134 MMHG | WEIGHT: 140 LBS | HEART RATE: 83 BPM | DIASTOLIC BLOOD PRESSURE: 78 MMHG | HEIGHT: 63 IN

## 2022-02-14 DIAGNOSIS — Z13.220 LIPID SCREENING: ICD-10-CM

## 2022-02-14 DIAGNOSIS — E06.3 HYPOTHYROIDISM DUE TO HASHIMOTO'S THYROIDITIS: ICD-10-CM

## 2022-02-14 DIAGNOSIS — I49.3 PVC (PREMATURE VENTRICULAR CONTRACTION): Primary | ICD-10-CM

## 2022-02-14 DIAGNOSIS — I10 ESSENTIAL (PRIMARY) HYPERTENSION: ICD-10-CM

## 2022-02-14 DIAGNOSIS — E03.8 HYPOTHYROIDISM DUE TO HASHIMOTO'S THYROIDITIS: ICD-10-CM

## 2022-02-14 DIAGNOSIS — R60.0 EDEMA OF BOTH LOWER EXTREMITIES: ICD-10-CM

## 2022-02-14 DIAGNOSIS — I47.1 PAROXYSMAL SVT (SUPRAVENTRICULAR TACHYCARDIA) (HCC): ICD-10-CM

## 2022-02-14 LAB
ALT SERPL-CCNC: 12 U/L (ref 5–33)
AST SERPL-CCNC: 19 U/L (ref 5–32)
CHOLESTEROL, TOTAL: 222 MG/DL (ref 160–199)
HDLC SERPL-MCNC: 47 MG/DL (ref 65–121)
LDL CHOLESTEROL CALCULATED: 153 MG/DL
TRIGL SERPL-MCNC: 110 MG/DL (ref 0–149)

## 2022-02-14 PROCEDURE — 1036F TOBACCO NON-USER: CPT | Performed by: CLINICAL NURSE SPECIALIST

## 2022-02-14 PROCEDURE — 99214 OFFICE O/P EST MOD 30 MIN: CPT | Performed by: CLINICAL NURSE SPECIALIST

## 2022-02-14 PROCEDURE — 1123F ACP DISCUSS/DSCN MKR DOCD: CPT | Performed by: CLINICAL NURSE SPECIALIST

## 2022-02-14 PROCEDURE — 4040F PNEUMOC VAC/ADMIN/RCVD: CPT | Performed by: CLINICAL NURSE SPECIALIST

## 2022-02-14 PROCEDURE — G8427 DOCREV CUR MEDS BY ELIG CLIN: HCPCS | Performed by: CLINICAL NURSE SPECIALIST

## 2022-02-14 PROCEDURE — 1090F PRES/ABSN URINE INCON ASSESS: CPT | Performed by: CLINICAL NURSE SPECIALIST

## 2022-02-14 PROCEDURE — G8484 FLU IMMUNIZE NO ADMIN: HCPCS | Performed by: CLINICAL NURSE SPECIALIST

## 2022-02-14 PROCEDURE — G8420 CALC BMI NORM PARAMETERS: HCPCS | Performed by: CLINICAL NURSE SPECIALIST

## 2022-02-14 ASSESSMENT — ENCOUNTER SYMPTOMS
SHORTNESS OF BREATH: 0
COUGH: 0
CHEST TIGHTNESS: 0
NAUSEA: 0
VOMITING: 0
ABDOMINAL PAIN: 0
FACIAL SWELLING: 0
WHEEZING: 0
EYE REDNESS: 0

## 2022-02-14 NOTE — PATIENT INSTRUCTIONS
Return in about 6 months (around 8/14/2022) for Dr. Shelley Andrea. Elevate legs when sitting, compression stocks, low sodium diet    Call with any questionsor concerns  Follow up with Jamel Schilder, MD for non cardiac problems  Report any new problems  Cardiovascular Fitness-Exercise as tolerated. Strive for 15 minutes of exercise most days of the week. Cardiac / HealthyDiet  Continue current medications as directed  Continue plan of treatment  It is always recommended that you bring your medicationsbottles with you to each visit - this is for your safety!

## 2022-02-14 NOTE — PROGRESS NOTES
[facility], [address], [address]  [address]  Phone: [phone]  Fax: [phone]    OFFICE VISIT:  2022    Elan Johnson - : 1934    Reason For Visit:  Tatum Tran is a 80 y.o. female who is here for 6 Month Follow-Up (Patient c/o edema in ankles) and Tachycardia       Diagnosis Orders   1. PVC (premature ventricular contraction)     2. Paroxysmal SVT (supraventricular tachycardia) ([facility]     3. Essential (primary) hypertension     4. Hypothyroidism due to Hashimoto's thyroiditis     5. Edema of both lower extremities           HPI  Patient is here for follow-up for ventricular ectopy and brief SVT found on recent heart monitor. She states she had been taking a green tea extract and assuming she discontinued this, her symptoms resolved. She did get a cardiac work-up with a Lexiscan nuclear stress test that was negative for ischemia and an echocardiogram that showed mild mitral and tricuspid regurgitation with a normal LVEF. 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 She denies chest pain, unusual dyspnea, orthopnea, PND,  palpitations. She notices some mild lower extremity edema that usually resolves by morning. She is active at home and tolerates activity well      Sánchez Cuenca MD is PCP.   Elan Johnson has the following history as recorded in [facility]:    Patient Active Problem List    Diagnosis Date Noted    [facility]ox[facility]ra[facility]ricular [facility]cardia) [facility] 2020    Personal history of breast cancer 2020    Memory loss     History of memory loss 2019    Lumbosacral spondylosis without myelopathy 2019    Hx of spinal surgery 2019    DDD (degenerative disc disease), lumbar 2019    Iron deficiency anemia 10/17/2017    Essential (primary) hypertension 2017    Recurrent major depressive disorder, in full remission ([facility] 2017    Familial hypercholesterolemia 2017    Hypothyroidism due to Hashimoto's thyroiditis 2017    Gastroesophageal reflux disease without esophagitis 2017    Primary osteoarthritis involving multiple joints 2017    Statin intolerance 2017    Gastritis without bleeding 2016    History of colonic polyps 2016     Past Medical History:   Diagnosis Date    Breast cyst     Cancer [facility])     surgery only    Colon polyp     Family history of colon cancer 2016    GERD (gastroesophageal reflux disease)     History of double vision     with surgical correction    Hypertension     Thyroid disease      Past Surgical History:   Procedure Laterality Date    ANKLE FRACTURE SURGERY Right     BACK SURGERY      plating    BREAST RECONSTRUCTION      césar. mastectomy with replacement    COLONOSCOPY  2007    Dr. Carl Salcido (LOWER) N/A 2016    Dr Cristy Fields non-obstructing simple appearing pancreatic cyst of 2.2 x 1.9mm-no high risk features were seen    EYE SURGERY      recent    HYSTERECTOMY      [place] TOTAL KNEE ARTHROPLASTY Left 10/17/2017    KNEE TOTAL ARTHROPLASTY performed by Mauro Hayden MD at [address]      UPPER GASTROINTESTINAL ENDOSCOPY N/A 2016    Dr NNEKA Bhatia-gastritis/gastropathy     Family History   Problem Relation Age of Onset    Colon Cancer Mother     Colon Polyps Mother     Rectal Cancer Mother 80    Breast Cancer Daughter 64        Had bilateral mastectomy    Esophageal Cancer Neg Hx     Liver Disease Neg Hx     Lung Cancer Neg Hx     Stomach Cancer Neg Hx      Social History     Tobacco Use    Smoking status: Former Smoker     Packs/day: 1.00     Years: 17.00     Pack years: 17.00     Quit date: 1985     Years since quittin.7    Smokeless tobacco: Never Used   Substance Use Topics    Alcohol use: Yes     Comment: rare      Current Outpatient Medications bruise/bleed easily. Psychiatric/Behavioral: Negative for agitation. The patient is not nervous/anxious. Objective  Vital Signs - /78   Pulse 83   Ht 5' 3\" (1.6 m)   Wt 140 lb (63.5 kg)   SpO2 100%   BMI 24.80 kg/m²    Wt Readings from Last 3 Encounters:   02/14/22 140 lb (63.5 kg)   01/31/22 140 lb (63.5 kg)   01/21/22 141 lb (64 kg)      Physical Exam  Vitals and nursing note reviewed. Constitutional:       General: She is not in acute distress. Appearance: Normal appearance. She is well-developed. She is not diaphoretic. HENT:      Head: Normocephalic and atraumatic. Right Ear: Hearing and external ear normal.      Left Ear: Hearing and external ear normal.      Nose: Nose normal.   Eyes:      General:         Right eye: No discharge. Left eye: No discharge. Pupils: Pupils are equal, round, and reactive to light. Neck:      Thyroid: No thyromegaly. Vascular: No carotid bruit or JVD. Trachea: No tracheal deviation. Cardiovascular:      Rate and Rhythm: Normal rate. Rhythm irregular. Heart sounds: Normal heart sounds. No murmur heard. No friction rub. No gallop. Pulmonary:      Effort: Pulmonary effort is normal. No respiratory distress. Breath sounds: Normal breath sounds. No wheezing or rales. Abdominal:      Palpations: Abdomen is soft. Tenderness: There is no abdominal tenderness. Musculoskeletal:         General: No swelling or deformity. Cervical back: Neck supple. No muscular tenderness. Right lower leg: Edema (trace) present. Left lower leg: Edema (trace) present. Skin:     General: Skin is warm and dry. Findings: No rash. Neurological:      General: No focal deficit present. Mental Status: She is alert and oriented to person, place, and time. Cranial Nerves: No cranial nerve deficit.    Psychiatric:         Mood and Affect: Mood normal.         Behavior: Behavior normal.         Judgment: Judgment normal.         Data:  Echo 10/23/20  Summary   Mitral valve leaflets are mildly thickened with preserved leaflet   mobility. Mild mitral regurgitation is present. Mildly thickened aortic valve leaflets with preserved leaflet mobility. Tricuspid valve is structurally normal.   Mild tricuspid regurgitation with estimated RVSP of 36 mm Hg. Mildly dilated left atrium. Normal left ventricular size with preserved LV function and an estimated   ejection fraction of approximately 55-60%. Impaired relaxation compatible with diastolic dysfunction. ( reversed E/A   ratio)   No regional wall motion abnormalities. Lexiscan 12/4/20  Summary impressions:    Normal study with normal ejection fraction 57% normal perfusion study    without evidence of ischemia or prior scar    Signed by Dr Halima Mario on 12/4/2020 3:14 PM      Heart Monitor 8/12-8/26/20  Summary impressions:     1. Sinus rhythm minimal heart rate 49 average rate 74 maximal   rate 137     2. 1 episode ventricular tachycardia recorded 8 beats maximal   rate 139 average rate 117     3. 20 episode supraventricular tachycardia recorded longest 9.3   seconds maximal rate 184     4. No pauses greater than 3.0 seconds were recorded     5. No episodes of complete or Mobitz 2 atrioventricular block   were recorded     6. No episodes of atrial fibrillation were recorded     7. No diary or triggered events were recorded     8.  Frequent PVCs otherwise rare ectopy noted occasional bigeminy   and trigeminy noted     Lab Results   Component Value Date    TSHFT4 0.57 08/02/2019    TSH 0.573 06/28/2021     Lab Results   Component Value Date     06/28/2021    K 4.6 06/28/2021     06/28/2021    CO2 30 (H) 06/28/2021    BUN 16 06/28/2021    CREATININE 1.0 (H) 06/28/2021    GLUCOSE 103 06/28/2021    CALCIUM 10.0 06/28/2021    PROT 6.6 06/28/2021    LABALBU 4.0 06/28/2021    BILITOT 0.4 06/28/2021    ALKPHOS 89 06/28/2021    AST 20 06/28/2021 ALT 13 06/28/2021    LABGLOM 52 (A) 06/28/2021    GFRAA >59 06/28/2021       Assessment:     Diagnosis Orders   1. PVC (premature ventricular contraction)     2. Paroxysmal SVT (supraventricular tachycardia) ([address]     3. Essential (primary) hypertension     4. Hypothyroidism due to Hashimoto's thyroiditis     5. Edema of both lower extremities         PVCs/paroxysmal SVT-stable without recurrence. No issues since stopping tea extract she states. She continues to limit her caffeine intake. She continues on metoprolol    Hypertension-well-controlled on current regimen with lisinopril    Hypothyroidism-reviewed recent TSH, stable. Edema extremities-currently mild and improves by morning. We discussed use of an as needed diuretic, but she defers at this time. Encouraged leg elevation when sitting, compression socks and low-sodium diet    Stable cardiovascular status. No evidence of overt heart failure,angina or dysrhythmia. Plan    Return in about 6 months (around 8/14/2022) for Dr. Ana Moreland. Elevate legs when sitting, compression stocks, low sodium diet    Call with any questionsor concerns  Follow up with Adolfo Aguillon MD for non cardiac problems  Report any new problems  Cardiovascular Fitness-Exercise as tolerated. Strive for 15 minutes of exercise most days of the week. Cardiac / HealthyDiet  Continue current medications as directed  Continue plan of treatment  It is always recommended that you bring your medicationsbottles with you to each visit - this is for your safety!        ELTON Hernandez

## 2022-03-07 RX ORDER — METOPROLOL TARTRATE 50 MG/1
TABLET, FILM COATED ORAL
Qty: 180 TABLET | Refills: 0 | Status: SHIPPED | OUTPATIENT
Start: 2022-03-07 | End: 2022-06-20

## 2022-03-07 NOTE — TELEPHONE ENCOUNTER
Krzysztof See called to request a refill on her medication.       Last office visit : 1/21/2022   Next office visit : 7/21/2022     Requested Prescriptions     Signed Prescriptions Disp Refills    metoprolol tartrate (LOPRESSOR) 50 MG tablet 180 tablet 0     Sig: Take 1 tablet by mouth twice daily     Authorizing Provider: Mian Hanson     Ordering User: Luigi Mancia

## 2022-03-22 DIAGNOSIS — Z12.11 ENCOUNTER FOR FIT (FECAL IMMUNOCHEMICAL TEST) SCREENING: Primary | ICD-10-CM

## 2022-03-22 LAB
CONTROL: NORMAL
HEMOCCULT STL QL: NORMAL

## 2022-03-22 PROCEDURE — 82274 ASSAY TEST FOR BLOOD FECAL: CPT | Performed by: FAMILY MEDICINE

## 2022-05-11 ENCOUNTER — TELEPHONE (OUTPATIENT)
Dept: FAMILY MEDICINE CLINIC | Age: 87
End: 2022-05-11

## 2022-05-11 NOTE — TELEPHONE ENCOUNTER
NURSE TRIAGE CALL   Red word: Swelling     Call Details:    Spoke with pt and she says she is having some swelling in both of her ankles. She said she was seeing a doctor in [facility] that was treating her for her Thyroid. She says she is not going back to him and wishes for Dr. Laury Gaucher to take over that care. She says she is taking an extra half of her thyroid medication that is helping with the swelling. I asked the patient if she had any type of water pill. She said no she doesn't like them. I tried to get her scheduled today with NP but she refused.  Scheduled with you Friday at 11:00 per pt request.

## 2022-05-13 ENCOUNTER — TELEPHONE (OUTPATIENT)
Dept: FAMILY MEDICINE CLINIC | Age: 87
End: 2022-05-13

## 2022-05-13 ENCOUNTER — HOSPITAL ENCOUNTER (OUTPATIENT)
Dept: VASCULAR LAB | Age: 87
Discharge: HOME | End: 2022-05-13
Payer: MEDICARE

## 2022-05-13 ENCOUNTER — OFFICE VISIT (OUTPATIENT)
Dept: FAMILY MEDICINE CLINIC | Age: 87
End: 2022-05-13
Payer: MEDICARE

## 2022-05-13 VITALS
DIASTOLIC BLOOD PRESSURE: 86 MMHG | HEART RATE: 66 BPM | BODY MASS INDEX: 24.8 KG/M2 | SYSTOLIC BLOOD PRESSURE: 148 MMHG | TEMPERATURE: 98.3 F | WEIGHT: 140 LBS | OXYGEN SATURATION: 99 %

## 2022-05-13 DIAGNOSIS — E03.9 PRIMARY HYPOTHYROIDISM: ICD-10-CM

## 2022-05-13 DIAGNOSIS — R22.41 LOCALIZED SWELLING OF RIGHT LOWER EXTREMITY: ICD-10-CM

## 2022-05-13 DIAGNOSIS — R22.41 LOCALIZED SWELLING OF RIGHT LOWER EXTREMITY: Primary | ICD-10-CM

## 2022-05-13 DIAGNOSIS — N18.31 STAGE 3A CHRONIC KIDNEY DISEASE (HCC): ICD-10-CM

## 2022-05-13 DIAGNOSIS — I10 ESSENTIAL (PRIMARY) HYPERTENSION: ICD-10-CM

## 2022-05-13 DIAGNOSIS — R06.00 DYSPNEA, UNSPECIFIED TYPE: ICD-10-CM

## 2022-05-13 PROBLEM — N18.30 CHRONIC RENAL DISEASE, STAGE III (HCC): Status: ACTIVE | Noted: 2022-05-13

## 2022-05-13 LAB
ALBUMIN SERPL-MCNC: 4.1 G/DL (ref 3.5–5.2)
ALP BLD-CCNC: 109 U/L (ref 35–104)
ALT SERPL-CCNC: 13 U/L (ref 5–33)
ANION GAP SERPL CALCULATED.3IONS-SCNC: 11 MMOL/L (ref 7–19)
AST SERPL-CCNC: 22 U/L (ref 5–32)
BASOPHILS ABSOLUTE: 0.1 K/UL (ref 0–0.2)
BASOPHILS RELATIVE PERCENT: 0.7 % (ref 0–1)
BILIRUB SERPL-MCNC: 0.4 MG/DL (ref 0.2–1.2)
BUN BLDV-MCNC: 11 MG/DL (ref 8–23)
CALCIUM SERPL-MCNC: 9.8 MG/DL (ref 8.8–10.2)
CHLORIDE BLD-SCNC: 102 MMOL/L (ref 98–111)
CO2: 27 MMOL/L (ref 22–29)
CREAT SERPL-MCNC: 0.9 MG/DL (ref 0.5–0.9)
EOSINOPHILS ABSOLUTE: 0.3 K/UL (ref 0–0.6)
EOSINOPHILS RELATIVE PERCENT: 2.7 % (ref 0–5)
GFR AFRICAN AMERICAN: >59
GFR NON-AFRICAN AMERICAN: 59
GLUCOSE BLD-MCNC: 97 MG/DL (ref 74–109)
HCT VFR BLD CALC: 42 % (ref 37–47)
HEMOGLOBIN: 13.1 G/DL (ref 12–16)
IMMATURE GRANULOCYTES #: 0.1 K/UL
LYMPHOCYTES ABSOLUTE: 2.5 K/UL (ref 1.1–4.5)
LYMPHOCYTES RELATIVE PERCENT: 25 % (ref 20–40)
MCH RBC QN AUTO: 28.5 PG (ref 27–31)
MCHC RBC AUTO-ENTMCNC: 31.2 G/DL (ref 33–37)
MCV RBC AUTO: 91.3 FL (ref 81–99)
MONOCYTES ABSOLUTE: 0.9 K/UL (ref 0–0.9)
MONOCYTES RELATIVE PERCENT: 9.3 % (ref 0–10)
NEUTROPHILS ABSOLUTE: 6 K/UL (ref 1.5–7.5)
NEUTROPHILS RELATIVE PERCENT: 61.4 % (ref 50–65)
PDW BLD-RTO: 17.7 % (ref 11.5–14.5)
PLATELET # BLD: 303 K/UL (ref 130–400)
PMV BLD AUTO: 10 FL (ref 9.4–12.3)
POTASSIUM SERPL-SCNC: 4.4 MMOL/L (ref 3.5–5)
PRO-BNP: 188 PG/ML (ref 0–1800)
RBC # BLD: 4.6 M/UL (ref 4.2–5.4)
SODIUM BLD-SCNC: 140 MMOL/L (ref 136–145)
T4 FREE: 1.33 NG/DL (ref 0.93–1.7)
TOTAL PROTEIN: 6.3 G/DL (ref 6.6–8.7)
TSH SERPL DL<=0.05 MIU/L-ACNC: 0.53 UIU/ML (ref 0.27–4.2)
WBC # BLD: 9.8 K/UL (ref 4.8–10.8)

## 2022-05-13 PROCEDURE — 93971 EXTREMITY STUDY: CPT

## 2022-05-13 PROCEDURE — 1036F TOBACCO NON-USER: CPT | Performed by: FAMILY MEDICINE

## 2022-05-13 PROCEDURE — G8420 CALC BMI NORM PARAMETERS: HCPCS | Performed by: FAMILY MEDICINE

## 2022-05-13 PROCEDURE — 1090F PRES/ABSN URINE INCON ASSESS: CPT | Performed by: FAMILY MEDICINE

## 2022-05-13 PROCEDURE — 99214 OFFICE O/P EST MOD 30 MIN: CPT | Performed by: FAMILY MEDICINE

## 2022-05-13 PROCEDURE — 4040F PNEUMOC VAC/ADMIN/RCVD: CPT | Performed by: FAMILY MEDICINE

## 2022-05-13 PROCEDURE — G8427 DOCREV CUR MEDS BY ELIG CLIN: HCPCS | Performed by: FAMILY MEDICINE

## 2022-05-13 PROCEDURE — 1123F ACP DISCUSS/DSCN MKR DOCD: CPT | Performed by: FAMILY MEDICINE

## 2022-05-13 NOTE — PROGRESS NOTES
[facility] PHYSICIAN SERVICES  [facility] FAM[facility] MEDICINE  [address] [facility] [address] [address]  Dept: [phone]  Dept Fax: : [phone]    Rosa Bolivar is a 80 y.o. female who presents today for her medical conditions/complaints as noted below. Rosa Bolivar is here for Joint Swelling        HPI:   CC: Here today to discuss the followinyear-old coming in today for concerns of swelling around her right ankle. She also noticed some bruising the anterior shin. She does not remember any injury. She is having some calf pain as well. Swelling is very mild. She does have pain with ambulation. Uncertain about shortness of breath. She states sometimes she feels shortness of breath otherwise does not    She states has been under the care of an endocrinologist has been managing her thyroid. She states she would prefer to have me manage it. HPI    Subjective:      Review of Systems  Review of Systems   Constitutional: Negative for chills and fever. HENT: Negative for congestion. Respiratory: Negative for cough, chest tightness   Cardiovascular: Negative for chest pain, palpitations and leg swelling. Gastrointestinal: Negative for abdominal pain, anal bleeding, constipation, diarrhea and nausea. Genitourinary: Negative for difficulty urinating. Psychiatric/Behavioral: Negative. SeeHPI for visit specific review of symptoms. All others negative      Objective:   BP (!) 148/86   Pulse 66   Temp 98.3 °F (36.8 °C)   Wt 140 lb (63.5 kg)   SpO2 99%   BMI 24.80 kg/m²   Physical Exam  Physical Exam   Constitutional: She appears well-developed. Does not appear ill. Neck: Normal range of motion. Neck supple. No masses. Neck Symmetric. Normal tracheal position. No thyroid enlargement  Cardiovascular: Normal rate and regular rhythm. Exam reveals no friction rub. Carotid arteries: no bruit observed. No murmur heard.   Respiratory:  Effort normal and breath sounds normal. No respiratory distress. No wheezes. No rales. No use of accessory muscles or intercostal retractions. Neurological: alert. Psychiatric: normal mood and affect. Her behavior is normal. Normal judgement and insight observed. Extremities: Trace edema bilateral lower extremities. Ecchymosis right anterior shin no bony tenderness.   Negative Marlou Marilyn' sign    Recent Results (from the past 672 hour(s))   Comprehensive Metabolic Panel    Collection Time: 05/13/22 11:59 AM   Result Value Ref Range    Sodium 140 136 - 145 mmol/L    Potassium 4.4 3.5 - 5.0 mmol/L    Chloride 102 98 - 111 mmol/L    CO2 27 22 - 29 mmol/L    Anion Gap 11 7 - 19 mmol/L    Glucose 97 74 - 109 mg/dL    BUN 11 8 - 23 mg/dL    CREATININE 0.9 0.5 - 0.9 mg/dL    GFR Non- 59 (A) >60    GFR African American >59 >59    Calcium 9.8 8.8 - 10.2 mg/dL    Total Protein 6.3 (L) 6.6 - 8.7 g/dL    Albumin 4.1 3.5 - 5.2 g/dL    Total Bilirubin 0.4 0.2 - 1.2 mg/dL    Alkaline Phosphatase 109 (H) 35 - 104 U/L    ALT 13 5 - 33 U/L    AST 22 5 - 32 U/L   TSH    Collection Time: 05/13/22 11:59 AM   Result Value Ref Range    TSH 0.529 0.270 - 4.200 uIU/mL   T4, Free    Collection Time: 05/13/22 11:59 AM   Result Value Ref Range    T4 Free 1.33 0.93 - 1.70 ng/dL   Brain Natriuretic Peptide    Collection Time: 05/13/22 11:59 AM   Result Value Ref Range    Pro- 0 - 1,800 pg/mL   CBC with Auto Differential    Collection Time: 05/13/22 11:59 AM   Result Value Ref Range    WBC 9.8 4.8 - 10.8 K/uL    RBC 4.60 4.20 - 5.40 M/uL    Hemoglobin 13.1 12.0 - 16.0 g/dL    Hematocrit 42.0 37.0 - 47.0 %    MCV 91.3 81.0 - 99.0 fL    MCH 28.5 27.0 - 31.0 pg    MCHC 31.2 (L) 33.0 - 37.0 g/dL    RDW 17.7 (H) 11.5 - 14.5 %    Platelets 079 928 - 355 K/uL    MPV 10.0 9.4 - 12.3 fL    Neutrophils % 61.4 50.0 - 65.0 %    Lymphocytes % 25.0 20.0 - 40.0 %    Monocytes % 9.3 0.0 - 10.0 %    Eosinophils % 2.7 0.0 - 5.0 %    Basophils % 0.7 0.0 - 1.0 %    Neutrophils Absolute 6.0 1.5 - 7.5 K/uL    Immature Granulocytes # 0.1 K/uL    Lymphocytes Absolute 2.5 1.1 - 4.5 K/uL    Monocytes Absolute 0.90 0.00 - 0.90 K/uL    Eosinophils Absolute 0.30 0.00 - 0.60 K/uL    Basophils Absolute 0.10 0.00 - 0.20 K/uL               Assessment & Plan: The following diagnoses and conditions are stable with no further orders unless indicated:  1. Localized swelling of right lower extremity  Venous ultrasound shows no evidence of DVT  Suspect the localized swelling is related to varicosities which were observed on exam  - VL Extremity Venous Right; Future  - CBC with Auto Differential; Future  - Comprehensive Metabolic Panel; Future  She does not recall any injury but there is some obvious bruising. 2. Stage 3a chronic kidney disease ([facility]  Lab Results   Component Value Date    CREATININE 0.9 05/13/2022     Lab Results   Component Value Date    BUN 11 05/13/2022         Reinforced goals of adequate hydration. Recommended he avoid NSAIDs such as naproxen and ibuprofen. 3. Dyspnea, unspecified type  No results found for: BNP  BNP within normal limits    - CBC with Auto Differential; Future  - Brain Natriuretic Peptide; Future  - Comprehensive Metabolic Panel; Future    4. Primary hypothyroidism  Lab Results   Component Value Date    TSH 0.529 05/13/2022    T4FREE 1.33 05/13/2022     Thyroid studies stable  Continue with levothyroxine 50 mcg daily  - T4, Free; Future  - TSH; Future    5. Hypertension:  BP Readings from Last 3 Encounters:   05/13/22 (!) 148/86   02/14/22 134/78   01/21/22 136/88     Blood pressure is higher today than last visit  She has been unable to tolerate more aggressive blood pressure control  Continue with lisinopril 40 mg daily  Lopressor 50 mg twice daily    Suggest she check ambulatory blood pressures weekly and if greater than 135/85 to contact me    No follow-ups on file.            Discussed use, benefit, and side effects of prescribed medications. All patient questions answered. Pt voiced understanding. Reviewed health maintenance. Instructedto continue current medications, diet and exercise. Patient agreed with treatmentplan.  Follow up as directed.     _______________________________________________________________      Past Medical History:   Diagnosis Date    Breast cyst     Cancer ([address]     surgery only    Colon polyp     Family history of colon cancer 2016    GERD (gastroesophageal reflux disease)     History of double vision     with surgical correction    Hypertension     Thyroid disease       Past Surgical History:   Procedure Laterality Date    ANKLE FRACTURE SURGERY Right     BACK SURGERY      plating    BREAST RECONSTRUCTION      césar. mastectomy with replacement    COLONOSCOPY  2007    Dr. Buddy Shi (LOWER) N/A 2016    Dr Marlene Fan non-obstructing simple appearing pancreatic cyst of 2.2 x 1.9mm-no high risk features were seen    EYE SURGERY      recent    HYSTERECTOMY      [place] TOTAL KNEE ARTHROPLASTY Left 10/17/2017    KNEE TOTAL ARTHROPLASTY performed by Marbin Cooper MD at [address]      UPPER GASTROINTESTINAL ENDOSCOPY N/A 2016    Dr Ihsan Bhatia-gastritis/gastropathy       Family History   Problem Relation Age of Onset    Colon Cancer Mother     Colon Polyps Mother     Rectal Cancer Mother 80    Breast Cancer Daughter 64        Had bilateral mastectomy    Esophageal Cancer Neg Hx     Liver Disease Neg Hx     Lung Cancer Neg Hx     Stomach Cancer Neg Hx        Social History     Tobacco Use    Smoking status: Former Smoker     Packs/day: 1.00     Years: 17.00     Pack years: 17.00     Quit date: 1985     Years since quittin.0    Smokeless tobacco: Never Used   Substance Use Topics    Alcohol use: Yes     Comment: rare     Current Outpatient Medications   Medication Sig Dispense Refill    metoprolol tartrate (LOPRESSOR) 50 MG tablet Take 1 tablet by mouth twice daily 180 tablet 0    levothyroxine (SYNTHROID) 50 MCG tablet Take 1 tablet by mouth daily 90 tablet 1    lisinopril (PRINIVIL;ZESTRIL) 20 MG tablet Take 2 tablets by mouth once daily 180 tablet 3    TURMERIC PO Take 1,000 mg by mouth daily       Coenzyme Q10 (COQ10) 200 MG CAPS Take 1 tablet by mouth daily      Cholecalciferol (VITAMIN D3) 2000 units TABS Take by mouth      Probiotic Product (PROBIOTIC-10 PO) Take by mouth      NONFORMULARY Take 1 tablet by mouth daily Indications: focus factor      docusate sodium (COLACE) 100 MG capsule Take 1 capsule by mouth daily To prevent constipation 20 capsule 0    Multiple Vitamin (MULTIVITAMIN PO) Take 1 tablet by mouth daily       NONFORMULARY Indications: cbd oil  (Patient not taking: Reported on 5/13/2022)      DiphenhydrAMINE HCl (BENADRYL ALLERGY PO) Take 1 tablet by mouth daily as needed Indications: Seasonal Allergy  (Patient not taking: Reported on 5/13/2022)       No current facility-administered medications for this visit.      Allergies   Allergen Reactions    Demerol Other (See Comments)     Abdominal spasms with severe n/v/    Meperidine Other (See Comments)    Nsaids Other (See Comments)     Dark tarry stools    Stadol [Butorphanol Tartrate]     Sulfa Antibiotics        Health Maintenance   Topic Date Due    DTaP/Tdap/Td vaccine (1 - Tdap) Never done    Shingles vaccine (1 of 2) Never done    Flu vaccine (Season Ended) 01/21/2023 (Originally 9/1/2022)    Depression Monitoring  06/17/2022    Annual Wellness Visit (AWV)  06/18/2022    Colorectal Cancer Screen  Completed    Pneumococcal 65+ years Vaccine  Completed    COVID-19 Vaccine  Completed    Hepatitis A vaccine  Aged Out    Hepatitis B vaccine  Aged Out    Hib vaccine  Aged Out    Meningococcal (ACWY) vaccine  Aged Out

## 2022-05-13 NOTE — TELEPHONE ENCOUNTER
Patricia Valencia with vascular gave prelim result of nothing acute showing on the patients venous scan from this afternoon.

## 2022-05-13 NOTE — TELEPHONE ENCOUNTER
No evidence of DVT  It appears to be related to her acute swelling. My guess would be to ice the area and elevate it.   Should resolve in time

## 2022-06-06 ENCOUNTER — TELEPHONE (OUTPATIENT)
Dept: CARDIOLOGY CLINIC | Age: 87
End: 2022-06-06

## 2022-06-06 NOTE — TELEPHONE ENCOUNTER
Left vm advising patient saw Gregorio Turner on 2/14 and patient was left on for 6/8 instead of August per her note. Plan     Return in about 6 months (around 8/14/2022) for Dr. Maria Del Carmen Henson.    Elevate legs when sitting, compression stocks, low sodium diet

## 2022-06-20 RX ORDER — METOPROLOL TARTRATE 50 MG/1
TABLET, FILM COATED ORAL
Qty: 180 TABLET | Refills: 0 | Status: SHIPPED | OUTPATIENT
Start: 2022-06-20 | End: 2022-10-17

## 2022-07-21 ENCOUNTER — OFFICE VISIT (OUTPATIENT)
Dept: FAMILY MEDICINE CLINIC | Age: 87
End: 2022-07-21
Payer: MEDICARE

## 2022-07-21 VITALS
OXYGEN SATURATION: 97 % | SYSTOLIC BLOOD PRESSURE: 160 MMHG | BODY MASS INDEX: 25.16 KG/M2 | TEMPERATURE: 97.8 F | DIASTOLIC BLOOD PRESSURE: 78 MMHG | HEIGHT: 63 IN | HEART RATE: 73 BPM | WEIGHT: 142 LBS

## 2022-07-21 DIAGNOSIS — Z00.00 ANNUAL PHYSICAL EXAM: Primary | ICD-10-CM

## 2022-07-21 DIAGNOSIS — E03.9 PRIMARY HYPOTHYROIDISM: ICD-10-CM

## 2022-07-21 DIAGNOSIS — I10 ESSENTIAL (PRIMARY) HYPERTENSION: ICD-10-CM

## 2022-07-21 PROCEDURE — 99213 OFFICE O/P EST LOW 20 MIN: CPT | Performed by: FAMILY MEDICINE

## 2022-07-21 PROCEDURE — 1123F ACP DISCUSS/DSCN MKR DOCD: CPT | Performed by: FAMILY MEDICINE

## 2022-07-21 PROCEDURE — G0439 PPPS, SUBSEQ VISIT: HCPCS | Performed by: FAMILY MEDICINE

## 2022-07-21 RX ORDER — AMLODIPINE BESYLATE 2.5 MG/1
2.5 TABLET ORAL DAILY
Qty: 30 TABLET | Refills: 5 | Status: SHIPPED | OUTPATIENT
Start: 2022-07-21 | End: 2022-08-17 | Stop reason: SDUPTHER

## 2022-07-21 ASSESSMENT — PATIENT HEALTH QUESTIONNAIRE - PHQ9
10. IF YOU CHECKED OFF ANY PROBLEMS, HOW DIFFICULT HAVE THESE PROBLEMS MADE IT FOR YOU TO DO YOUR WORK, TAKE CARE OF THINGS AT HOME, OR GET ALONG WITH OTHER PEOPLE: 0
5. POOR APPETITE OR OVEREATING: 0
3. TROUBLE FALLING OR STAYING ASLEEP: 0
9. THOUGHTS THAT YOU WOULD BE BETTER OFF DEAD, OR OF HURTING YOURSELF: 0
SUM OF ALL RESPONSES TO PHQ QUESTIONS 1-9: 0
1. LITTLE INTEREST OR PLEASURE IN DOING THINGS: 0
7. TROUBLE CONCENTRATING ON THINGS, SUCH AS READING THE NEWSPAPER OR WATCHING TELEVISION: 0
SUM OF ALL RESPONSES TO PHQ QUESTIONS 1-9: 0
SUM OF ALL RESPONSES TO PHQ9 QUESTIONS 1 & 2: 0
6. FEELING BAD ABOUT YOURSELF - OR THAT YOU ARE A FAILURE OR HAVE LET YOURSELF OR YOUR FAMILY DOWN: 0
4. FEELING TIRED OR HAVING LITTLE ENERGY: 0
8. MOVING OR SPEAKING SO SLOWLY THAT OTHER PEOPLE COULD HAVE NOTICED. OR THE OPPOSITE, BEING SO FIGETY OR RESTLESS THAT YOU HAVE BEEN MOVING AROUND A LOT MORE THAN USUAL: 0
2. FEELING DOWN, DEPRESSED OR HOPELESS: 0

## 2022-07-21 ASSESSMENT — LIFESTYLE VARIABLES
HOW MANY STANDARD DRINKS CONTAINING ALCOHOL DO YOU HAVE ON A TYPICAL DAY: 1 OR 2
HOW OFTEN DO YOU HAVE A DRINK CONTAINING ALCOHOL: MONTHLY OR LESS

## 2022-07-21 NOTE — PROGRESS NOTES
Medicare Annual Wellness Visit - Subsequent    Name: Olive Velarde Date: 2022   MRN: [id number] Sex: Female   Age: 80 y.o. Ethnicity: Non- / Non    : 1934 Race: White (non-)      Elva Morris is here for   Chief Complaint   Patient presents with    Medicare AWV        Screenings for behavioral, psychosocial and functional/safety risks, and cognitive dysfunction are all negative except as indicated below. These results, as well as other patient data from the [address] form, are documented in Flowsheets linked to this Encounter. Allergies   Allergen Reactions    Demerol Other (See Comments)     Abdominal spasms with severe n/v/    Meperidine Other (See Comments)    Nsaids Other (See Comments)     Dark tarry stools    Stadol [Butorphanol Tartrate]     Sulfa Antibiotics        Prior to Visit Medications    Medication Sig Taking? Authorizing Provider   amLODIPine (NORVASC) 2.5 MG tablet Take 1 tablet by mouth in the morning.  Yes Mahad Mccain MD   metoprolol tartrate (LOPRESSOR) 50 MG tablet Take 1 tablet by mouth twice daily Yes Mahad Mccain MD   levothyroxine (SYNTHROID) 50 MCG tablet Take 1 tablet by mouth daily Yes Mahad Mccain MD   lisinopril (PRINIVIL;ZESTRIL) 20 MG tablet Take 2 tablets by mouth once daily Yes Mahad Mccain MD   TURMERIC PO Take 1,000 mg by mouth daily  Yes Historical Provider, MD   Coenzyme Q10 (COQ10) 200 MG CAPS Take 1 tablet by mouth daily Yes Historical Provider, MD   Cholecalciferol (VITAMIN D3) 2000 units TABS Take by mouth Yes Historical Provider, MD   Probiotic Product (PROBIOTIC-10 PO) Take by mouth Yes Historical Provider, MD   NONFORMULARY Take 1 tablet by mouth daily Indications: focus factor Yes Historical Provider, MD   docusate sodium (COLACE) 100 MG capsule Take 1 capsule by mouth daily To prevent constipation Yes Arsalan Gusman MD   Multiple Vitamin (MULTIVITAMIN PO) Take 1 tablet by mouth daily  Yes Historical Provider, MD   NONFORMULARY Indications: cbd oil  Historical Provider, MD   DiphenhydrAMINE HCl (BENADRYL ALLERGY PO) Take 1 tablet by mouth daily as needed Indications: Seasonal Allergy  Historical Provider, MD         Past Medical History:   Diagnosis Date    Breast cyst     Cancer ([facility] [address]     surgery only    Colon polyp     Family history of colon cancer 6/17/2016    GERD (gastroesophageal reflux disease)     History of double vision     with surgical correction    Hypertension     Thyroid disease          Past Surgical History:   Procedure Laterality Date    ANKLE FRACTURE SURGERY Right     BACK SURGERY      plating    BREAST RECONSTRUCTION      césar. mastectomy with replacement    COLONOSCOPY  04/19/2007    Dr. Angle Rodriguez (LOWER) N/A 5/25/2016    Dr Rafi Browne non-obstructing simple appearing pancreatic cyst of 2.2 x 1.9mm-no high risk features were seen    EYE SURGERY      recent    HYSTERECTOMY      [place] TOTAL KNEE ARTHROPLASTY Left 10/17/2017    KNEE TOTAL ARTHROPLASTY performed by Ina Castillo MD at [facility] ENDOSCOPY N/A 5/25/2016    Dr NNEKA Bhatia-gastritis/gastropathy       Family History   Problem Relation Age of Onset    Colon Cancer Mother     Colon Polyps Mother     Rectal Cancer Mother 80    Breast Cancer Daughter 64        Had bilateral mastectomy    Esophageal Cancer Neg Hx     Liver Disease Neg Hx     Lung Cancer Neg Hx     Stomach Cancer Neg Hx        CareTeam (Including outside providers/suppliers regularly involved in providing care):   Patient Care Team:  Efrem Manuel MD as PCP - General (Family Medicine)  Efrem Manuel MD as PCP - [facility] Empaneled Provider  ELTON Valencia (Family Nurse Practitioner)    Wt Readings from Last 3 Encounters:   07/21/22 142 lb (64.4 kg)   05/13/22 140 lb (63.5 kg)   02/14/22 140 lb (63.5 kg)     Vitals:    07/21/22 1346 BP: (!) 160/78   Pulse: 73   Temp: 97.8 °F (36.6 °C)   SpO2: 97%   Weight: 142 lb (64.4 kg)   Height: 5' 3\" (1.6 m)           The following problems were reviewed today and where indicated follow up appointments were made and/or referrals ordered. Risk Factor Screenings with Interventions     Fall Risk:  2 or more falls in past year?: no  Fall with injury in past year?: no  Fall Risk Interventions:    Not indicated    Depression:  PHQ-2 Score: 0  Depression Interventions:  Not indicated    Anxiety:     Anxiety Interventions:  Not indicated    Cognitive:  Clock Drawing Test (CDT): Normal  Cognitive Impairment Interventions:  Not indicated    Substance Abuse:  Social History     Socioeconomic History    Marital status:       Spouse name: Not on file    Number of children: Not on file    Years of education: Not on file    Highest education level: Not on file   Occupational History    Not on file   Tobacco Use    Smoking status: Former     Packs/day: 1.00     Years: 17.00     Pack years: 17.00     Types: Cigarettes     Quit date: 1985     Years since quittin.1    Smokeless tobacco: Never   Vaping Use    Vaping Use: Never used   Substance and Sexual Activity    Alcohol use: Yes     Comment: rare    Drug use: No    Sexual activity: Not on file   Other Topics Concern    Not on file   Social History Narrative    Not on file     Social Determinants of Health     Financial Resource Strain: Not on file   Food Insecurity: Not on file   Transportation Needs: Not on file   Physical Activity: Sufficiently Active    Days of Exercise per Week: 5 days    Minutes of Exercise per Session: 30 min   Stress: Not on file   Social Connections: Not on file   Intimate Partner Violence: Not on file   Housing Stability: Not on file        Substance Abuse Interventions:  Not indicated    Health Risk Assessment:     General  In general, how would you say your health is?: Very Good  In the past 7 days, have you experienced any of the following: New or Increased Pain, New or Increased Fatigue, Loneliness, Social Isolation, Stress or Anger?: No  Do you get the social and emotional support that you need?: Yes  General Health Risk Interventions:  Not indicated    Health Habits/Nutrition  On average, how many days per week do you engage in moderate to strenuous exercise (like a brisk walk)?: 5 days  On average, how many minutes do you engage in exercise at this level?: 30 min  Have you lost any weight without trying in the past 3 months?: No  Have you seen the dentist within the past year?: (!) No  Body mass index is 25.15 kg/m².   Health Habits/Nutrition Interventions:  Not indicated    Hearing/Vision  Do you or your family notice any trouble with your hearing that hasn't been managed with hearing aids?: No  Do you have difficulty driving, watching TV, or doing any of your daily activities because of your eyesight?: No  Have you had an eye exam within the past year?: (!) No  Hearing/Vision Interventions:  Recommend yearly eye exam    Safety  Do you have working smoke detectors?: Yes  Do you have any tripping hazards - loose or unsecured carpets or rugs?: No  Do you have any tripping hazards - clutter in doorways, halls, or stairs?: No  Do you have either shower bars, grab bars, non-slip mats or non-slip surfaces in your shower or bathtub?: Yes  Do all of your stairways have a railing or banister?: Not Applicable  Do you always fasten your seatbelt when you are in a car?: Yes  Safety Interventions:  Not indicated    ADLs  In the past 7 days, did you need help from others to perform any of the following everyday activities: Eating, dressing, grooming, bathing, toileting, or walking/balance?: No  In the past 7 days, did you need help from others to take care of any of the following: Laundry, housekeeping, banking/finances, shopping, telephone use, food preparation, transportation, or taking medications?: No  ADL Interventions:  Not indicated    Personalized Preventive Plan   Current Health Maintenance Status  Immunization History   Administered Date(s) Administered    COVID-19, MODERNA BLUE border, Primary or Immunocompromised, (age 12y+), IM, 100 mcg/0.5mL 02/12/2021, 03/12/2021, 10/25/2021    COVID-19, MODERNA Booster BLUE border, (age 18y+), IM, 50mcg/0.25mL 05/21/2022    Pneumococcal Conjugate 13-valent (Sdstrie15) 09/28/2015    Pneumococcal Polysaccharide ([id number]) 09/29/2017        Health Maintenance   Topic Date Due    Annual Wellness Visit (AWV)  06/18/2022    DTaP/Tdap/Td vaccine (1 - Tdap) 07/21/2023 (Originally 3/19/1953)    Shingles vaccine (1 of 2) 07/21/2023 (Originally 3/19/1984)    Flu vaccine (1) 09/01/2022    Depression Monitoring  07/21/2023    Colorectal Cancer Screen  Completed    Pneumococcal 65+ years Vaccine  Completed    COVID-19 Vaccine  Completed    Hepatitis A vaccine  Aged Out    Hepatitis B vaccine  Aged Out    Hib vaccine  Aged Out    Meningococcal (ACWY) vaccine  Aged Out       Recommendations for [place] Due: see orders.   Recommended screening schedule for the next 5-10 years is provided to the patient in written form: see Patient Instructions/AVS.

## 2022-07-21 NOTE — PATIENT INSTRUCTIONS
7 to 10 Year Well Child    Vasu Morgna is a 9 year old male who presents for well child exam.  Patient presents with mom.    Concerns raised today include:  ---Mom and Vasu state the Adderall he takes makes him not hungry. He will take it first thing in the morning and again in the afternoon. He will usually eat a big dinner. We discussed offering breakfast in the morning before the medication, or trying carnation instant breakfast to help get something in even if he is not too hungry. I also encouraged him to reach out to Dr. Cormier as this is who he follows with.     SOCIAL:  School: 4th grade at [employer].  Concerns for school performance: None  Concerns for behavior: None  Interests / Activities: Video games. Art. Riding bikes.     Medical history, surgical history, and family history reviewed and updated.    PHYSICAL EXAM:  Blood pressure 102/62, pulse 106, height 4' 5\" (1.346 m), weight 25.8 kg, SpO2 99 %.  GENERAL:  Well appearing  male, nontoxic, no acute distress.  Alert and interactive.  SKIN: Warm, normal turgor.  No cyanosis.  No bruises or lesions.  HEAD:  Normocephalic, atraumatic.  EYES:  Conjunctivae without injection or icterus.  PERRL (pupils equal, round, reactive to light), EOMI (extraocular movements intact).  NOSE: No flaring.  EARS:  TMs (tympanic membranes) transparent with good landmarks.  THROAT:  Oropharynx with moist mucous membranes and no lesions.  NECK:  Supple, no lymphadenopathy or masses.  HEART:  Regular rate and rhythm.  Quiet precordium.  Normal S1, S2.  No murmurs, rubs, gallops.   LUNGS:  Clear to auscultation bilaterally.  No wheezes, rales, rhonchi.  Normal work of breathing.  ABDOMEN:  Soft, nontender.  No organomegaly or masses.  GENITOURINARY: Deferred per patient request.  EXTREMITIES:  Warm, dry, without abnormalities.  NEUROLOGIC:  Normal tone, bulk, strength.    ASSESSMENT AND PLAN:    Encounter for routine child health examination without abnormal findings  All  We are committed to providing you with the best care possible. In order to help us achieve these goals please remember to bring all medications, herbal products, and over the counter supplements with you to each visit. If your provider has ordered testing for you, please be sure to follow up with our office if you have not received results within 7 days after the testing took place. *If you receive a survey after visiting one of our offices, please take time to share your experience concerning your physician office visit. These surveys are confidential and no health information about you is shared. We are eager to improve for you and we are counting on your feedback to help make that happen. parental concerns and questions discussed.  Anticipatory guidance provided, handout given.              Safety/car/bicycle/fire/sharp objects/falls/water              Development              Discipline              Diet              Television              Analgesics/antipyretics              Sun exposure              Tobacco-free home              Dental care  Immunizations up to date.  Return to clinic in 1 year for well child exam or sooner prn illness/concerns.    Attention deficit hyperactivity disorder (ADHD), combined type  - Continue to follow with Dr. Cormier. Behaviors are greatly improved with the Adderall.    Vasu Morgan indicates understanding of the above and is agreeable with the plan.    Return in about 1 year (around 8/27/2021) for annual exam.    RENETTA Peraza, FNP-C    [address] [place] [address]  P: [phone]

## 2022-07-21 NOTE — PROGRESS NOTES
[facility] PHYSICIAN SERVICES  [facility] FAMILY MEDICINE  [address] [address] [address]  Dept: [phone]  Dept Fax: : [phone]    Chula Street is a 80 y.o. female who presents today for her medical conditions/complaints as noted below. Chula Street is here for Medicare AWV        HPI:   CC: Here today to discuss the following:    Hypertension  Compliant with medications. No adverse effects from medication. No lightheadedness, palpitations, or chest pain. Hypothyroidism  Symptoms are stable. No temperature intolerance, fatigue, or mood disturbance from baseline. Complaint with current medication. The pain she was experiencing in her left lower extremity is doing much better today. Almost completely resolved        HPI    Subjective:      Review of Systems    Review of Systems   Constitutional: Negative for chills and fever. HENT: Negative for congestion. Respiratory: Negative for cough, chest tightness and shortness of breath. Cardiovascular: Negative for chest pain, palpitations and leg swelling. Gastrointestinal: Negative for abdominal pain, anal bleeding, constipation, diarrhea and nausea. Genitourinary: Negative for difficulty urinating. Psychiatric/Behavioral: Negative. SeeHPI for visit specific review of symptoms. All others negative      Objective:   BP (!) 160/78   Pulse 73   Temp 97.8 °F (36.6 °C)   Ht 5' 3\" (1.6 m)   Wt 142 lb (64.4 kg)   SpO2 97%   BMI 25.15 kg/m²   Physical Exam  Physical Exam   Constitutional: She appears well-developed. Does not appear ill. Neck: Neck supple. No masses. Neck Symmetric. Normal tracheal position. No thyroid enlargement  Cardiovascular: Normal rate and regular rhythm. Exam reveals no friction rub. No murmur heard. Respiratory:  Effort normal and breath sounds normal. No respiratory distress. No wheezes. No rales.  No use of accessory muscles or intercostal retractions. Neurological: alert. Psychiatric: normal mood and affect. Her behavior is normal.       No results found for this or any previous visit (from the past 672 hour(s)). Missed her laboratory appointment. Assessment & Plan: The following diagnoses and conditions are stable with no further orders unless indicated:  1. Annual physical exam  Completed annual wellness    2. Primary hypothyroidism  Lab Results   Component Value Date    TSH 0.529 05/13/2022    T4FREE 1.33 05/13/2022     Continue with Synthroid    3. Essential (primary) hypertension    Continue with lisinopril 40 mg daily  Continue with metoprolol tartrate 50 mg twice daily  Add amlodipine 2.5 mg daily        Return in about 1 month (around 8/21/2022) for Routine follow up - 20 minutes. Discussed use, benefit, and side effects of prescribed medications. All patient questions answered. Pt voiced understanding. Reviewed health maintenance. Instructedto continue current medications, diet and exercise. Patient agreed with treatmentplan.  Follow up as directed.     _______________________________________________________________      Past Medical History:   Diagnosis Date    Breast cyst     Cancer ([address]     surgery only    Colon polyp     Family history of colon cancer 6/17/2016    GERD (gastroesophageal reflux disease)     History of double vision     with surgical correction    Hypertension     Thyroid disease       Past Surgical History:   Procedure Laterality Date    ANKLE FRACTURE SURGERY Right     BACK SURGERY      plating    BREAST RECONSTRUCTION      césar. mastectomy with replacement    COLONOSCOPY  04/19/2007    Dr. Cassandra Flynn ([facility]) N/A 5/25/2016    Dr Delores Peacock non-obstructing simple appearing pancreatic cyst of 2.2 x 1.9mm-no high risk features were seen    EYE SURGERY      recent    HYSTERECTOMY      [place] TOTAL KNEE ARTHROPLASTY Left 10/17/2017    KNEE TOTAL ARTHROPLASTY [place] [facility] [facility] [place] [facility] [place]    TONSILLECTOMY AND ADENOIDECTOMY      TUBAL LIGATION      UPPER GASTROINTESTINAL ENDOSCOPY N/A 2016    Dr NNEKA Bhatia-gastritis/gastropathy       Family History   Problem Relation Age of Onset    Colon Cancer Mother     Colon Polyps Mother     Rectal Cancer Mother 80    Breast Cancer Daughter 64        Had bilateral mastectomy    Esophageal Cancer Neg Hx     Liver Disease Neg Hx     Lung Cancer Neg Hx     Stomach Cancer Neg Hx        Social History     Tobacco Use    Smoking status: Former     Packs/day: 1.00     Years: 17.00     Pack years: 17.00     Types: Cigarettes     Quit date: 1985     Years since quittin.1    Smokeless tobacco: Never   Substance Use Topics    Alcohol use: Yes     Comment: rare     Current Outpatient Medications   Medication Sig Dispense Refill    amLODIPine (NORVASC) 2.5 MG tablet Take 1 tablet by mouth in the morning. 30 tablet 5    metoprolol tartrate (LOPRESSOR) 50 MG tablet Take 1 tablet by mouth twice daily 180 tablet 0    levothyroxine (SYNTHROID) 50 MCG tablet Take 1 tablet by mouth daily 90 tablet 1    lisinopril (PRINIVIL;ZESTRIL) 20 MG tablet Take 2 tablets by mouth once daily 180 tablet 3    TURMERIC PO Take 1,000 mg by mouth daily       Coenzyme Q10 (COQ10) 200 MG CAPS Take 1 tablet by mouth daily      Cholecalciferol (VITAMIN D3) 2000 units TABS Take by mouth      Probiotic Product (PROBIOTIC-10 PO) Take by mouth      NONFORMULARY Take 1 tablet by mouth daily Indications: focus factor      docusate sodium (COLACE) 100 MG capsule Take 1 capsule by mouth daily To prevent constipation 20 capsule 0    Multiple Vitamin (MULTIVITAMIN PO) Take 1 tablet by mouth daily       NONFORMULARY Indications: cbd oil      DiphenhydrAMINE HCl (BENADRYL ALLERGY PO) Take 1 tablet by mouth daily as needed Indications: Seasonal Allergy       No current facility-administered medications for this visit.      Allergies   Allergen Reactions    Demerol Other (See Comments)     Abdominal spasms with severe n/v/    Meperidine Other (See Comments)    Nsaids Other (See Comments)     Dark tarry stools    Stadol [Butorphanol Tartrate]     Sulfa Antibiotics        Health Maintenance   Topic Date Due    Annual Wellness Visit (AWV)  06/18/2022    DTaP/Tdap/Td vaccine (1 - Tdap) 07/21/2023 (Originally 3/19/1953)    Shingles vaccine (1 of 2) 07/21/2023 (Originally 3/19/1984)    Flu vaccine (1) 09/01/2022    Depression Monitoring  07/21/2023    Colorectal Cancer Screen  Completed    Pneumococcal 65+ years Vaccine  Completed    COVID-19 Vaccine  Completed    Hepatitis A vaccine  Aged Out    Hepatitis B vaccine  Aged Out    Hib vaccine  Aged Out    Meningococcal (ACWY) vaccine  Aged Out       _______________________________________________________________    Note dictated using Dragon Dictation software  Sometimes this dictation software makes erroneous transcriptions.

## 2022-08-17 ENCOUNTER — TELEPHONE (OUTPATIENT)
Dept: CARDIOLOGY CLINIC | Age: 87
End: 2022-08-17

## 2022-08-17 ENCOUNTER — OFFICE VISIT (OUTPATIENT)
Dept: CARDIOLOGY CLINIC | Age: 87
End: 2022-08-17
Payer: MEDICARE

## 2022-08-17 VITALS
HEART RATE: 61 BPM | DIASTOLIC BLOOD PRESSURE: 82 MMHG | HEIGHT: 63 IN | BODY MASS INDEX: 24.8 KG/M2 | WEIGHT: 140 LBS | SYSTOLIC BLOOD PRESSURE: 144 MMHG

## 2022-08-17 DIAGNOSIS — I47.1 PAROXYSMAL SVT (SUPRAVENTRICULAR TACHYCARDIA) (HCC): Primary | ICD-10-CM

## 2022-08-17 DIAGNOSIS — I10 ESSENTIAL HYPERTENSION: ICD-10-CM

## 2022-08-17 DIAGNOSIS — E78.2 MIXED HYPERLIPIDEMIA: ICD-10-CM

## 2022-08-17 PROCEDURE — 1123F ACP DISCUSS/DSCN MKR DOCD: CPT | Performed by: INTERNAL MEDICINE

## 2022-08-17 PROCEDURE — 99214 OFFICE O/P EST MOD 30 MIN: CPT | Performed by: INTERNAL MEDICINE

## 2022-08-17 PROCEDURE — 93000 ELECTROCARDIOGRAM COMPLETE: CPT | Performed by: INTERNAL MEDICINE

## 2022-08-17 PROCEDURE — 1036F TOBACCO NON-USER: CPT | Performed by: INTERNAL MEDICINE

## 2022-08-17 PROCEDURE — G8420 CALC BMI NORM PARAMETERS: HCPCS | Performed by: INTERNAL MEDICINE

## 2022-08-17 PROCEDURE — G8427 DOCREV CUR MEDS BY ELIG CLIN: HCPCS | Performed by: INTERNAL MEDICINE

## 2022-08-17 PROCEDURE — 1090F PRES/ABSN URINE INCON ASSESS: CPT | Performed by: INTERNAL MEDICINE

## 2022-08-17 RX ORDER — ROSUVASTATIN CALCIUM 5 MG/1
5 TABLET, COATED ORAL DAILY
Qty: 30 TABLET | Refills: 5 | Status: SHIPPED | OUTPATIENT
Start: 2022-08-17

## 2022-08-17 RX ORDER — AMLODIPINE BESYLATE 5 MG/1
5 TABLET ORAL DAILY
Qty: 90 TABLET | Refills: 3 | Status: SHIPPED | OUTPATIENT
Start: 2022-08-17 | End: 2022-10-25 | Stop reason: ALTCHOICE

## 2022-08-17 NOTE — PROGRESS NOTES
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 Evaluation has confirmed the presence of normal ventricular function and a Lexiscan dual-isotope in 2020 revealed no evidence of ischemia. Her lipid profile from 2018 revealed an LDL of 143 with a repeat in February of this year with an LDL of 153 and no previous therapy. Her blood pressure in addition has been persistently elevated and recently her regimen was changed with the addition of 2-1/2 mg of amlodipine. She relates no change in exercise tolerance, no unusual dyspnea and no exertional chest discomfort. She has been vaccinated and boosted for COVID-19. PHYSICAL EXAM  On exam she carries 140 pounds in a 5 foot 3 inch frame. Pressure is 144/82 pulse of 61. EOMs full, sclerae and conjunctiva normal. PERRLA. Mask in place. Trachea midline with no neck masses. Assessment of internal jugular veins reveals no elevation of central venous pressure at 45 degrees. Carotid pulses normal without delay or bruit. Thyroid normal to palpation. Chest exam reveals normal respiratory effort, no abnormal breath sounds and normal expiratory phase. No skin lesions seen. PMI normal. S1, S2 normal without murmur or maurice or click. Normal bowel sounds without palpable mass or bruit. No clubbing or acrocyanosis. No significant lower extremity edema or signs of venous insufficiency. General motor strength appears to be within normal limits. Normal range of motion with normal gait. Alert, oriented x 3, memory and cognition normal as reflected by history and conversation. EKG reveals sinus rhythm without abnormalities. ASSESSMENT/PLAN:   Hypertension -inadequate control at present. Will increase amlodipine to 5.   She has a follow-up appointment with primary care in another week at which time she will very likely require further titration of her regimen. Continue amlodipine and lisinopril. Suggest adding clonidine at bedtime should she require further coverage. Dyslipidemia -uncontrolled. Restarting Crestor 5 5 mg nightly with a lipid liver profile in 2 months. Will likely require higher dose. Ventricular ectopy -no recent symptomatology.   Continue metoprolol  Pandemic response -appropriate/vaccinated/boosted x2

## 2022-08-17 NOTE — TELEPHONE ENCOUNTER
Called to move up appt to 2:30 with a 2:15 arrival at [facility] due to a cancellation, BW 8/17

## 2022-08-26 DIAGNOSIS — E03.9 PRIMARY HYPOTHYROIDISM: ICD-10-CM

## 2022-08-26 RX ORDER — LEVOTHYROXINE SODIUM 50 UG/1
TABLET ORAL
Qty: 90 TABLET | Refills: 3 | Status: SHIPPED | OUTPATIENT
Start: 2022-08-26

## 2022-08-26 RX ORDER — LISINOPRIL 20 MG/1
TABLET ORAL
Qty: 180 TABLET | Refills: 3 | Status: SHIPPED | OUTPATIENT
Start: 2022-08-26

## 2022-08-26 NOTE — TELEPHONE ENCOUNTER
Imer Santiago called requesting a refill of the below medication which has been pended for you:     Requested Prescriptions     Pending Prescriptions Disp Refills    EUTHYROX 50 MCG tablet [Pharmacy Med Name: Euthyrox 50 MCG Oral Tablet] 90 tablet 0     Sig: Take 1 tablet by mouth once daily    lisinopril (PRINIVIL;ZESTRIL) 20 MG tablet [Pharmacy Med Name: Lisinopril 20 MG Oral Tablet] 180 tablet 0     Sig: Take 2 tablets by mouth once daily       Last Appointment Date: 7/21/2022  Next Appointment Date: 8/29/2022    Allergies   Allergen Reactions    Demerol Other (See Comments)     Abdominal spasms with severe n/v/    Meperidine Other (See Comments)    Nsaids Other (See Comments)     Dark tarry stools    Stadol [Butorphanol Tartrate]     Sulfa Antibiotics

## 2022-08-29 ENCOUNTER — OFFICE VISIT (OUTPATIENT)
Dept: FAMILY MEDICINE CLINIC | Age: 87
End: 2022-08-29
Payer: MEDICARE

## 2022-08-29 VITALS
TEMPERATURE: 98.6 F | SYSTOLIC BLOOD PRESSURE: 152 MMHG | HEART RATE: 72 BPM | WEIGHT: 142 LBS | DIASTOLIC BLOOD PRESSURE: 92 MMHG | OXYGEN SATURATION: 98 % | BODY MASS INDEX: 25.15 KG/M2

## 2022-08-29 DIAGNOSIS — I10 ESSENTIAL (PRIMARY) HYPERTENSION: Primary | ICD-10-CM

## 2022-08-29 DIAGNOSIS — E03.9 PRIMARY HYPOTHYROIDISM: ICD-10-CM

## 2022-08-29 PROCEDURE — G8417 CALC BMI ABV UP PARAM F/U: HCPCS | Performed by: FAMILY MEDICINE

## 2022-08-29 PROCEDURE — 1090F PRES/ABSN URINE INCON ASSESS: CPT | Performed by: FAMILY MEDICINE

## 2022-08-29 PROCEDURE — 1036F TOBACCO NON-USER: CPT | Performed by: FAMILY MEDICINE

## 2022-08-29 PROCEDURE — G8427 DOCREV CUR MEDS BY ELIG CLIN: HCPCS | Performed by: FAMILY MEDICINE

## 2022-08-29 PROCEDURE — 1123F ACP DISCUSS/DSCN MKR DOCD: CPT | Performed by: FAMILY MEDICINE

## 2022-08-29 PROCEDURE — 99213 OFFICE O/P EST LOW 20 MIN: CPT | Performed by: FAMILY MEDICINE

## 2022-08-29 RX ORDER — LEVOTHYROXINE SODIUM 50 UG/1
TABLET ORAL
Qty: 90 TABLET | Refills: 0 | OUTPATIENT
Start: 2022-08-29

## 2022-08-29 NOTE — PROGRESS NOTES
[facility] PHYSICIAN SERVICES  [facility] MED[facility]  [address] [address]  Dept: [phone]  Dept Fax: : [phone]    Amos Owens is a 80 y.o. female who presents today for her medical conditions/complaints as noted below. Amos Owens is here for Follow-up        HPI:   CC: Here today to discuss the following: Follow-up for blood pressure. Last visit she was started on amlodipine 2.5 mg daily. She saw her cardiologist who suggested increasing to 5 mg. She has not done that yet. She is still taking her lisinopril 40 mg daily and Lopressor 50 mg twice daily. HPI    Subjective:      Review of Systems   Constitutional:  Negative for chills and fatigue. Respiratory:  Negative for shortness of breath and wheezing. Cardiovascular:  Negative for chest pain. SeeHPI for visit specific review of symptoms. All others negative      Objective:   BP (!) 152/92   Pulse 72   Temp 98.6 °F (37 °C)   Wt 142 lb (64.4 kg)   SpO2 98%   BMI 25.15 kg/m²   Physical Exam  Constitutional:       Appearance: She is well-developed. She is not ill-appearing. Cardiovascular:      Rate and Rhythm: Normal rate and regular rhythm. Heart sounds: No murmur heard. No friction rub. Pulmonary:      Effort: Pulmonary effort is normal. No respiratory distress. Breath sounds: Normal breath sounds. No wheezing or rales. Abdominal:      General: There is no distension. Palpations: Abdomen is soft. Tenderness: There is no abdominal tenderness. Musculoskeletal:      Cervical back: Neck supple. No tenderness. Lymphadenopathy:      Cervical: No cervical adenopathy. Neurological:      Mental Status: She is alert. Psychiatric:         Behavior: Behavior normal.         No results found for this or any previous visit (from the past 672 hour(s)). Assessment & Plan:         The following diagnoses and conditions are stable with no further orders unless indicated:  1. Essential (primary) hypertension  Increase amlodipine to 5 mg daily  Continue with lisinopril 40 mg daily  Continue with Lopressor 50 mg twice daily          Return in about 2 months (around 10/29/2022) for Routine follow up - 20 minutes. Discussed use, benefit, and side effects of prescribed medications. All patient questions answered. Pt voiced understanding. Reviewed health maintenance. Instructedto continue current medications, diet and exercise. Patient agreed with treatmentplan.  Follow up as directed.     _______________________________________________________________      Past Medical History:   Diagnosis Date    Breast cyst     Cancer ([address]     surgery only    Colon polyp     Family history of colon cancer 6/17/2016    GERD (gastroesophageal reflux disease)     History of double vision     with surgical correction    Hypertension     Thyroid disease       Past Surgical History:   Procedure Laterality Date    ANKLE FRACTURE SURGERY Right     BACK SURGERY      plating    BREAST RECONSTRUCTION      césar. mastectomy with replacement    COLONOSCOPY  04/19/2007    Dr. Kyleigh Philip (LOWER) N/A 5/25/2016    Dr Ant Wolfe non-obstructing simple appearing pancreatic cyst of 2.2 x 1.9mm-no high risk features were seen    EYE SURGERY      recent    HYSTERECTOMY (CERVIX STATUS UNKNOWN)      [place] TOTAL KNEE ARTHROPLASTY Left 10/17/2017    KNEE TOTAL ARTHROPLASTY performed by Edouard Hayes MD at [address] [place] [address] ENDOSCOPY N/A 5/25/2016    Dr NNEKA Bhatia-gastritis/gastropathy       Family History   Problem Relation Age of Onset    Colon Cancer Mother     Colon Polyps Mother     Rectal Cancer Mother 80    Breast Cancer Daughter 64        Had bilateral mastectomy    Esophageal Cancer Neg Hx     Liver Disease Neg Hx     Lung Cancer Neg Hx     Stomach Cancer Neg Hx Social History     Tobacco Use    Smoking status: Former     Packs/day: 1.00     Years: 17.00     Pack years: 17.00     Types: Cigarettes     Quit date: 1985     Years since quittin.3    Smokeless tobacco: Never   Substance Use Topics    Alcohol use: Yes     Comment: rare     Current Outpatient Medications   Medication Sig Dispense Refill    EUTHYROX 50 MCG tablet Take 1 tablet by mouth once daily 90 tablet 3    lisinopril (PRINIVIL;ZESTRIL) 20 MG tablet Take 2 tablets by mouth once daily 180 tablet 3    amLODIPine (NORVASC) 5 MG tablet Take 1 tablet by mouth daily (Patient taking differently: Take 2.5 mg by mouth daily) 90 tablet 3    metoprolol tartrate (LOPRESSOR) 50 MG tablet Take 1 tablet by mouth twice daily 180 tablet 0    TURMERIC PO Take 1,000 mg by mouth daily       Coenzyme Q10 (COQ10) 200 MG CAPS Take 1 tablet by mouth daily      Cholecalciferol (VITAMIN D3) 2000 units TABS Take by mouth      Probiotic Product (PROBIOTIC-10 PO) Take by mouth      NONFORMULARY Take 1 tablet by mouth daily Indications: focus factor      NONFORMULARY Indications: cbd oil      docusate sodium (COLACE) 100 MG capsule Take 1 capsule by mouth daily To prevent constipation 20 capsule 0    Multiple Vitamin (MULTIVITAMIN PO) Take 1 tablet by mouth daily       DiphenhydrAMINE HCl (BENADRYL ALLERGY PO) Take 1 tablet by mouth daily as needed Indications: Seasonal Allergy      rosuvastatin (CRESTOR) 5 MG tablet Take 1 tablet by mouth daily (Patient not taking: Reported on 2022) 30 tablet 5     No current facility-administered medications for this visit.      Allergies   Allergen Reactions    Demerol Other (See Comments)     Abdominal spasms with severe n/v/    Meperidine Other (See Comments)    Nsaids Other (See Comments)     Dark tarry stools    Stadol [Butorphanol Tartrate]     Sulfa Antibiotics        Health Maintenance   Topic Date Due    Flu vaccine (1) Never done    DTaP/Tdap/Td vaccine (1 - Tdap) 2023 (Originally 3/19/1953)    Shingles vaccine (1 of 2) 07/21/2023 (Originally [birth year]19/1984)    Lipids  02/14/2023    Depression Monitoring  07/21/2023    Annual Wellness Visit (AWV)  07/22/2023    Colorectal Cancer Screen  Completed    Pneumococcal 65+ years Vaccine  Completed    COVID-19 Vaccine  Completed    Hepatitis A vaccine  Aged Out    Hepatitis B vaccine  Aged Out    Hib vaccine  Aged Out    Meningococcal (ACWY) vaccine  Aged Out       _______________________________________________________________    Note dictated using Dragon Dictation software  Sometimes this dictation software makes erroneous transcriptions.

## 2022-08-29 NOTE — PATIENT INSTRUCTIONS
We are committed to providing you with the best care possible. In order to help us achieve these goals please remember to bring all medications, herbal products, and over the counter supplements with you to each visit. If your provider has ordered testing for you, please be sure to follow up with our office if you have not received results within 7 days after the testing took place. *If you receive a survey after visiting one of our offices, please take time to share your experience concerning your physician office visit. These surveys are confidential and no health information about you is shared.   We are eager to improve for you and we are counting on your feedback to help make that happen.       _______________________________________________________________  Current Outpatient Medications   Medication Sig Dispense    EUTHYROX 50 MCG tablet Take 1 tablet by mouth once daily thyroid    lisinopril (PRINIVIL;ZESTRIL) 20 MG tablet Take 2 tablets by mouth once daily Blood pressure    amLODIPine (NORVASC) 5 MG tablet Take 1 tablet by mouth daily  Blood pressure    metoprolol tartrate (LOPRESSOR) 50 MG tablet Take 1 tablet by mouth twice daily Blood pressure    TURMERIC PO Take 1,000 mg by mouth daily      Coenzyme Q10 (COQ10) 200 MG CAPS Take 1 tablet by mouth daily     Cholecalciferol (VITAMIN D3) 2000 units TABS Take by mouth     Probiotic Product (PROBIOTIC-10 PO) Take by mouth     NONFORMULARY Take 1 tablet by mouth daily Indications: focus factor     NONFORMULARY Indications: cbd oil     docusate sodium (COLACE) 100 MG capsule Take 1 capsule by mouth daily To prevent constipation Stool Softener    Multiple Vitamin (MULTIVITAMIN PO) Take 1 tablet by mouth daily      DiphenhydrAMINE HCl (BENADRYL ALLERGY PO) Take 1 tablet by mouth daily as needed Indications: Seasonal Allergy     rosuvastatin (CRESTOR) 5 MG tablet Take 1 tablet by mouth daily  Cholesterol     No current facility-administered medications for this visit.

## 2022-09-01 DIAGNOSIS — E03.9 PRIMARY HYPOTHYROIDISM: ICD-10-CM

## 2022-09-02 RX ORDER — LEVOTHYROXINE SODIUM 50 UG/1
TABLET ORAL
Qty: 90 TABLET | Refills: 0 | OUTPATIENT
Start: 2022-09-02

## 2022-09-03 DIAGNOSIS — E03.9 PRIMARY HYPOTHYROIDISM: ICD-10-CM

## 2022-09-06 RX ORDER — LEVOTHYROXINE SODIUM 50 UG/1
TABLET ORAL
Qty: 90 TABLET | Refills: 0 | OUTPATIENT
Start: 2022-09-06

## 2022-09-06 ASSESSMENT — ENCOUNTER SYMPTOMS
SHORTNESS OF BREATH: 0
WHEEZING: 0

## 2022-10-17 RX ORDER — METOPROLOL TARTRATE 50 MG/1
TABLET, FILM COATED ORAL
Qty: 180 TABLET | Refills: 3 | Status: SHIPPED | OUTPATIENT
Start: 2022-10-17

## 2022-10-25 ENCOUNTER — OFFICE VISIT (OUTPATIENT)
Dept: FAMILY MEDICINE CLINIC | Age: 87
End: 2022-10-25
Payer: MEDICARE

## 2022-10-25 VITALS
DIASTOLIC BLOOD PRESSURE: 82 MMHG | SYSTOLIC BLOOD PRESSURE: 162 MMHG | BODY MASS INDEX: 25.69 KG/M2 | HEART RATE: 68 BPM | WEIGHT: 145 LBS | OXYGEN SATURATION: 98 % | TEMPERATURE: 97.7 F

## 2022-10-25 DIAGNOSIS — R60.0 LOWER EXTREMITY EDEMA: Primary | ICD-10-CM

## 2022-10-25 DIAGNOSIS — I10 ESSENTIAL (PRIMARY) HYPERTENSION: Primary | ICD-10-CM

## 2022-10-25 DIAGNOSIS — R53.83 OTHER FATIGUE: ICD-10-CM

## 2022-10-25 DIAGNOSIS — R06.00 DYSPNEA, UNSPECIFIED TYPE: ICD-10-CM

## 2022-10-25 DIAGNOSIS — E03.9 PRIMARY HYPOTHYROIDISM: ICD-10-CM

## 2022-10-25 LAB
ALBUMIN SERPL-MCNC: 4.2 G/DL (ref 3.5–5.2)
ALP BLD-CCNC: 99 U/L (ref 35–104)
ALT SERPL-CCNC: 12 U/L (ref 5–33)
ANION GAP SERPL CALCULATED.3IONS-SCNC: 7 MMOL/L (ref 7–19)
AST SERPL-CCNC: 19 U/L (ref 5–32)
BASOPHILS ABSOLUTE: 0.1 K/UL (ref 0–0.2)
BASOPHILS RELATIVE PERCENT: 1.2 % (ref 0–1)
BILIRUB SERPL-MCNC: 0.3 MG/DL (ref 0.2–1.2)
BUN BLDV-MCNC: 17 MG/DL (ref 8–23)
CALCIUM SERPL-MCNC: 9.7 MG/DL (ref 8.8–10.2)
CHLORIDE BLD-SCNC: 102 MMOL/L (ref 98–111)
CO2: 28 MMOL/L (ref 22–29)
CREAT SERPL-MCNC: 1 MG/DL (ref 0.5–0.9)
EOSINOPHILS ABSOLUTE: 0.3 K/UL (ref 0–0.6)
EOSINOPHILS RELATIVE PERCENT: 3.7 % (ref 0–5)
GFR SERPL CREATININE-BSD FRML MDRD: 54 ML/MIN/{1.73_M2}
GLUCOSE BLD-MCNC: 88 MG/DL (ref 74–109)
HCT VFR BLD CALC: 38.9 % (ref 37–47)
HEMOGLOBIN: 12.3 G/DL (ref 12–16)
IMMATURE GRANULOCYTES #: 0.1 K/UL
LYMPHOCYTES ABSOLUTE: 2.5 K/UL (ref 1.1–4.5)
LYMPHOCYTES RELATIVE PERCENT: 26.5 % (ref 20–40)
MCH RBC QN AUTO: 28.8 PG (ref 27–31)
MCHC RBC AUTO-ENTMCNC: 31.6 G/DL (ref 33–37)
MCV RBC AUTO: 91.1 FL (ref 81–99)
MONOCYTES ABSOLUTE: 1.1 K/UL (ref 0–0.9)
MONOCYTES RELATIVE PERCENT: 11.8 % (ref 0–10)
NEUTROPHILS ABSOLUTE: 5.2 K/UL (ref 1.5–7.5)
NEUTROPHILS RELATIVE PERCENT: 56.2 % (ref 50–65)
PDW BLD-RTO: 17.6 % (ref 11.5–14.5)
PLATELET # BLD: 309 K/UL (ref 130–400)
PMV BLD AUTO: 9.6 FL (ref 9.4–12.3)
POTASSIUM SERPL-SCNC: 4.5 MMOL/L (ref 3.5–5)
PRO-BNP: 155 PG/ML (ref 0–1800)
RBC # BLD: 4.27 M/UL (ref 4.2–5.4)
SODIUM BLD-SCNC: 137 MMOL/L (ref 136–145)
T4 FREE: 1.1 NG/DL (ref 0.93–1.7)
TOTAL PROTEIN: 6.5 G/DL (ref 6.6–8.7)
TSH SERPL DL<=0.05 MIU/L-ACNC: 1.03 UIU/ML (ref 0.27–4.2)
WBC # BLD: 9.3 K/UL (ref 4.8–10.8)

## 2022-10-25 PROCEDURE — G8417 CALC BMI ABV UP PARAM F/U: HCPCS | Performed by: FAMILY MEDICINE

## 2022-10-25 PROCEDURE — G8427 DOCREV CUR MEDS BY ELIG CLIN: HCPCS | Performed by: FAMILY MEDICINE

## 2022-10-25 PROCEDURE — 1123F ACP DISCUSS/DSCN MKR DOCD: CPT | Performed by: FAMILY MEDICINE

## 2022-10-25 PROCEDURE — 1090F PRES/ABSN URINE INCON ASSESS: CPT | Performed by: FAMILY MEDICINE

## 2022-10-25 PROCEDURE — 99213 OFFICE O/P EST LOW 20 MIN: CPT | Performed by: FAMILY MEDICINE

## 2022-10-25 PROCEDURE — 1036F TOBACCO NON-USER: CPT | Performed by: FAMILY MEDICINE

## 2022-10-25 PROCEDURE — G8484 FLU IMMUNIZE NO ADMIN: HCPCS | Performed by: FAMILY MEDICINE

## 2022-10-25 RX ORDER — SPIRONOLACTONE 25 MG/1
25 TABLET ORAL DAILY
Qty: 30 TABLET | Refills: 5 | Status: SHIPPED | OUTPATIENT
Start: 2022-10-25

## 2022-10-25 ASSESSMENT — ENCOUNTER SYMPTOMS
SHORTNESS OF BREATH: 1
ANAL BLEEDING: 0
DIARRHEA: 0
CHEST TIGHTNESS: 0
COUGH: 0
NAUSEA: 0
ABDOMINAL PAIN: 0
CONSTIPATION: 0

## 2022-10-25 NOTE — PROGRESS NOTES
[facility] PHYSICIAN SERVICES  [facility]  [address] [address] [address]  Dept: [phone]  Dept Fax: : [phone]    Theresa Solano is a 80 y.o. female who presents today for her medical conditions/complaints as noted below. Theresa Solano is here for Leg Swelling        HPI:   CC: Here today to discuss the following:    Coming in today complaining of bilateral lower extremity edema. She states the edema is been present for the past 2 to 3 months. She was started on amlodipine 2.5 mg in July          HPI    Subjective:      Review of Systems   Constitutional:  Negative for chills and fever. HENT:  Negative for congestion. Respiratory:  Positive for shortness of breath. Negative for cough and chest tightness. Cardiovascular:  Negative for chest pain, palpitations and leg swelling. Gastrointestinal:  Negative for abdominal pain, anal bleeding, constipation, diarrhea and nausea. Genitourinary:  Negative for difficulty urinating. Psychiatric/Behavioral: Negative. SeeHPI for visit specific review of symptoms. All others negative      Objective:   BP (!) 162/82   Pulse 68   Temp 97.7 °F (36.5 °C)   Wt 145 lb (65.8 kg)   SpO2 98%   BMI 25.69 kg/m²   Physical Exam  Constitutional:       Appearance: She is well-developed. She is not ill-appearing. Cardiovascular:      Rate and Rhythm: Normal rate and regular rhythm. Heart sounds: No murmur heard. No friction rub. Pulmonary:      Effort: Pulmonary effort is normal.      Breath sounds: Normal breath sounds. Musculoskeletal:      Cervical back: Neck supple. No tenderness. Lymphadenopathy:      Cervical: No cervical adenopathy. Neurological:      Mental Status: She is alert. Extremities: 1+ pitting edema bilateral lower extremities      No results found for this or any previous visit (from the past 672 hour(s)). Assessment & Plan:         The following diagnoses and conditions are stable with no further orders unless indicated:  1. Lower extremity edema  2. Dyspnea, unspecified type  - Brain Natriuretic Peptide; Future  3. Other fatigue  - T4, Free; Future  - TSH; Future  - CBC with Auto Differential; Future  - Comprehensive Metabolic Panel; Future    _______________________________________________________________  Reviewed laboratory work. Suggest discontinuing amlodipine  Will start spironolactone 25 mg daily  Will recheck electrolytes and renal function in 4 weeks    Orders Placed This Encounter   Procedures    Brain Natriuretic Peptide     Standing Status:   Future     Standing Expiration Date:   10/25/2023    T4, Free     Standing Status:   Future     Standing Expiration Date:   10/25/2023    TSH     Standing Status:   Future     Standing Expiration Date:   10/25/2023    CBC with Auto Differential     Standing Status:   Future     Standing Expiration Date:   10/25/2023    Comprehensive Metabolic Panel     Standing Status:   Future     Standing Expiration Date:   10/25/2023           No follow-ups on file.    _______________________________________________________________  Not interested in flu shot. Discussed use, benefit, and side effects of prescribed medications. All patient questions answered. Pt voiced understanding. Reviewed health maintenance. Instructedto continue current medications, diet and exercise. Patient agreed with treatmentplan.  Follow up as directed.     _______________________________________________________________      Past Medical History:   Diagnosis Date    Breast cyst     Cancer ([address]     surgery only    Colon polyp     Family history of colon cancer 6/17/2016    GERD (gastroesophageal reflux disease)     History of double vision     with surgical correction    Hypertension     Thyroid disease       Past Surgical History:   Procedure Laterality Date    ANKLE FRACTURE SURGERY Right     BACK SURGERY      plating BREAST RECONSTRUCTION      césar. mastectomy with replacement    COLONOSCOPY  2007    Dr. Alisha Smith (LOWER) N/A 2016    Dr Adam Bermeo non-obstructing simple appearing pancreatic cyst of 2.2 x 1.9mm-no high risk features were seen    EYE SURGERY      recent    HYSTERECTOMY (CERVIX STATUS UNKNOWN)      [place] TOTAL KNEE ARTHROPLASTY Left 10/17/2017    KNEE TOTAL ARTHROPLASTY performed by Rey Sloan MD at [address] [facility] ENDOSCOPY N/A 2016    Dr NNEKA Bhatia-gastritis/gastropathy       Family History   Problem Relation Age of Onset    Colon Cancer Mother     Colon Polyps Mother     Rectal Cancer Mother 80    Breast Cancer Daughter 64        Had bilateral mastectomy    Esophageal Cancer Neg Hx     Liver Disease Neg Hx     Lung Cancer Neg Hx     Stomach Cancer Neg Hx        Social History     Tobacco Use    Smoking status: Former     Packs/day: 1.00     Years: 17.00     Pack years: 17.00     Types: Cigarettes     Quit date: 1985     Years since quittin.4    Smokeless tobacco: Never   Substance Use Topics    Alcohol use: Yes     Comment: rare     Current Outpatient Medications   Medication Sig Dispense Refill    metoprolol tartrate (LOPRESSOR) 50 MG tablet Take 1 tablet by mouth twice daily 180 tablet 3    EUTHYROX 50 MCG tablet Take 1 tablet by mouth once daily 90 tablet 3    lisinopril (PRINIVIL;ZESTRIL) 20 MG tablet Take 2 tablets by mouth once daily 180 tablet 3    TURMERIC PO Take 1,000 mg by mouth daily       Coenzyme Q10 (COQ10) 200 MG CAPS Take 1 tablet by mouth daily      Cholecalciferol (VITAMIN D3) 2000 units TABS Take by mouth      Probiotic Product (PROBIOTIC-10 PO) Take by mouth      NONFORMULARY Take 1 tablet by mouth daily Indications: focus factor      NONFORMULARY Indications: cbd oil      docusate sodium (COLACE) 100 MG capsule Take 1 capsule by mouth daily To prevent constipation 20 capsule 0    Multiple Vitamin (MULTIVITAMIN PO) Take 1 tablet by mouth daily       DiphenhydrAMINE HCl (BENADRYL ALLERGY PO) Take 1 tablet by mouth daily as needed Indications: Seasonal Allergy      rosuvastatin (CRESTOR) 5 MG tablet Take 1 tablet by mouth daily 30 tablet 5     No current facility-administered medications for this visit. Allergies   Allergen Reactions    Demerol Other (See Comments)     Abdominal spasms with severe n/v/    Meperidine Other (See Comments)    Nsaids Other (See Comments)     Dark tarry stools    Stadol [Butorphanol Tartrate]     Sulfa Antibiotics        Health Maintenance   Topic Date Due    Flu vaccine (1) Never done    DTaP/Tdap/Td vaccine (1 - Tdap) 07/21/2023 (Originally 3/19/1953)    Shingles vaccine (1 of 2) 07/21/2023 (Originally 3/19/1984)    Lipids  02/14/2023    Depression Monitoring  07/21/2023    Annual Wellness Visit (AWV)  07/22/2023    Colorectal Cancer Screen  Completed    Pneumococcal 65+ years Vaccine  Completed    COVID-19 Vaccine  Completed    Hepatitis A vaccine  Aged Out    Hib vaccine  Aged Out    Meningococcal (ACWY) vaccine  Aged Out       _______________________________________________________________    Note dictated using Dragon Dictation software  Sometimes this dictation software makes erroneous transcriptions.

## 2022-11-07 ENCOUNTER — OFFICE VISIT (OUTPATIENT)
Dept: FAMILY MEDICINE CLINIC | Age: 87
End: 2022-11-07
Payer: MEDICARE

## 2022-11-07 VITALS
DIASTOLIC BLOOD PRESSURE: 80 MMHG | WEIGHT: 144.5 LBS | TEMPERATURE: 97.1 F | BODY MASS INDEX: 25.6 KG/M2 | OXYGEN SATURATION: 99 % | HEART RATE: 67 BPM | HEIGHT: 63 IN | SYSTOLIC BLOOD PRESSURE: 128 MMHG

## 2022-11-07 DIAGNOSIS — I10 ESSENTIAL (PRIMARY) HYPERTENSION: Primary | ICD-10-CM

## 2022-11-07 DIAGNOSIS — I10 ESSENTIAL (PRIMARY) HYPERTENSION: ICD-10-CM

## 2022-11-07 LAB
ALBUMIN SERPL-MCNC: 4.1 G/DL (ref 3.5–5.2)
ALP BLD-CCNC: 102 U/L (ref 35–104)
ALT SERPL-CCNC: 17 U/L (ref 5–33)
ANION GAP SERPL CALCULATED.3IONS-SCNC: 9 MMOL/L (ref 7–19)
AST SERPL-CCNC: 21 U/L (ref 5–32)
BILIRUB SERPL-MCNC: <0.2 MG/DL (ref 0.2–1.2)
BUN BLDV-MCNC: 17 MG/DL (ref 8–23)
CALCIUM SERPL-MCNC: 9.9 MG/DL (ref 8.8–10.2)
CHLORIDE BLD-SCNC: 106 MMOL/L (ref 98–111)
CO2: 25 MMOL/L (ref 22–29)
CREAT SERPL-MCNC: 1 MG/DL (ref 0.5–0.9)
GFR SERPL CREATININE-BSD FRML MDRD: 54 ML/MIN/{1.73_M2}
GLUCOSE BLD-MCNC: 104 MG/DL (ref 74–109)
POTASSIUM SERPL-SCNC: 4.7 MMOL/L (ref 3.5–5)
SODIUM BLD-SCNC: 140 MMOL/L (ref 136–145)
TOTAL PROTEIN: 6.1 G/DL (ref 6.6–8.7)

## 2022-11-07 PROCEDURE — G8417 CALC BMI ABV UP PARAM F/U: HCPCS | Performed by: FAMILY MEDICINE

## 2022-11-07 PROCEDURE — 1090F PRES/ABSN URINE INCON ASSESS: CPT | Performed by: FAMILY MEDICINE

## 2022-11-07 PROCEDURE — G8484 FLU IMMUNIZE NO ADMIN: HCPCS | Performed by: FAMILY MEDICINE

## 2022-11-07 PROCEDURE — 1123F ACP DISCUSS/DSCN MKR DOCD: CPT | Performed by: FAMILY MEDICINE

## 2022-11-07 PROCEDURE — G8427 DOCREV CUR MEDS BY ELIG CLIN: HCPCS | Performed by: FAMILY MEDICINE

## 2022-11-07 PROCEDURE — 1036F TOBACCO NON-USER: CPT | Performed by: FAMILY MEDICINE

## 2022-11-07 PROCEDURE — 99213 OFFICE O/P EST LOW 20 MIN: CPT | Performed by: FAMILY MEDICINE

## 2022-11-07 SDOH — ECONOMIC STABILITY: FOOD INSECURITY: WITHIN THE PAST 12 MONTHS, YOU WORRIED THAT YOUR FOOD WOULD RUN OUT BEFORE YOU GOT MONEY TO BUY MORE.: NEVER TRUE

## 2022-11-07 SDOH — ECONOMIC STABILITY: FOOD INSECURITY: WITHIN THE PAST 12 MONTHS, THE FOOD YOU BOUGHT JUST DIDN'T LAST AND YOU DIDN'T HAVE MONEY TO GET MORE.: NEVER TRUE

## 2022-11-07 ASSESSMENT — SOCIAL DETERMINANTS OF HEALTH (SDOH): HOW HARD IS IT FOR YOU TO PAY FOR THE VERY BASICS LIKE FOOD, HOUSING, MEDICAL CARE, AND HEATING?: NOT VERY HARD

## 2022-11-07 NOTE — PROGRESS NOTES
Formerly [facility] PHYSICIAN SERVICES  [facility] FAMILY MEDICINE  [address] [address] [address]  Dept: [phone]  Dept Fax: : [phone]    Bre Velez is a 80 y.o. female who presents today for her medical conditions/complaints as noted below. Bre Velez is here for Follow-up and Hypertension        HPI:   CC: Here today to discuss the following:    She was just here on October 25 complaining of bilateral lower extremity edema. She has been experiencing the edema since being placed on amlodipine. Her amlodipine was discontinued  -She was started on spironolactone 25 mg daily  Advised to follow-up today  Lower extremity edema has resolved    HPI    Subjective:      Review of Systems    Review of Systems   Constitutional: Negative for chills and fever. HENT: Negative for congestion. Respiratory: Negative for cough, chest tightness and shortness of breath. Cardiovascular: Negative for chest pain, palpitations and leg swelling. Gastrointestinal: Negative for abdominal pain, anal bleeding, constipation, diarrhea and nausea. Genitourinary: Negative for difficulty urinating. Psychiatric/Behavioral: Negative. SeeHPI for visit specific review of symptoms. All others negative      Objective:   /80   Pulse 67   Temp 97.1 °F (36.2 °C)   Ht 5' 3\" (1.6 m)   Wt 144 lb 8 oz (65.5 kg)   SpO2 99%   BMI 25.60 kg/m²   Physical Exam  Physical Exam   Constitutional: She appears well-developed. Does not appear ill. Neck: Neck supple. No masses. Neck Symmetric. Normal tracheal position. No thyroid enlargement  Cardiovascular: Normal rate and regular rhythm. Exam reveals no friction rub. No murmur heard. Respiratory:  Effort normal and breath sounds normal. No respiratory distress. No wheezes. No rales. No use of accessory muscles or intercostal retractions. Neurological: alert. Psychiatric: normal mood and affect.  Her behavior is normal. Extremities: No lower extremity edema    Recent Results (from the past 672 hour(s))   Comprehensive Metabolic Panel    Collection Time: 10/25/22  2:23 PM   Result Value Ref Range    Sodium 137 136 - 145 mmol/L    Potassium 4.5 3.5 - 5.0 mmol/L    Chloride 102 98 - 111 mmol/L    CO2 28 22 - 29 mmol/L    Anion Gap 7 7 - 19 mmol/L    Glucose 88 74 - 109 mg/dL    BUN 17 8 - 23 mg/dL    Creatinine 1.0 (H) 0.5 - 0.9 mg/dL    Est, Glom Filt Rate 54 (A) >60    Calcium 9.7 8.8 - 10.2 mg/dL    Total Protein 6.5 (L) 6.6 - 8.7 g/dL    Albumin 4.2 3.5 - 5.2 g/dL    Total Bilirubin 0.3 0.2 - 1.2 mg/dL    Alkaline Phosphatase 99 35 - 104 U/L    ALT 12 5 - 33 U/L    AST 19 5 - 32 U/L   CBC with Auto Differential    Collection Time: 10/25/22  2:23 PM   Result Value Ref Range    WBC 9.3 4.8 - 10.8 K/uL    RBC 4.27 4.20 - 5.40 M/uL    Hemoglobin 12.3 12.0 - 16.0 g/dL    Hematocrit 38.9 37.0 - 47.0 %    MCV 91.1 81.0 - 99.0 fL    MCH 28.8 27.0 - 31.0 pg    MCHC 31.6 (L) 33.0 - 37.0 g/dL    RDW 17.6 (H) 11.5 - 14.5 %    Platelets 010 663 - 602 K/uL    MPV 9.6 9.4 - 12.3 fL    Neutrophils % 56.2 50.0 - 65.0 %    Lymphocytes % 26.5 20.0 - 40.0 %    Monocytes % 11.8 (H) 0.0 - 10.0 %    Eosinophils % 3.7 0.0 - 5.0 %    Basophils % 1.2 (H) 0.0 - 1.0 %    Neutrophils Absolute 5.2 1.5 - 7.5 K/uL    Immature Granulocytes # 0.1 K/uL    Lymphocytes Absolute 2.5 1.1 - 4.5 K/uL    Monocytes Absolute 1.10 (H) 0.00 - 0.90 K/uL    Eosinophils Absolute 0.30 0.00 - 0.60 K/uL    Basophils Absolute 0.10 0.00 - 0.20 K/uL   TSH    Collection Time: 10/25/22  2:23 PM   Result Value Ref Range    TSH 1.030 0.270 - 4.200 uIU/mL   T4, Free    Collection Time: 10/25/22  2:23 PM   Result Value Ref Range    T4 Free 1.10 0.93 - 1.70 ng/dL   Brain Natriuretic Peptide    Collection Time: 10/25/22  2:23 PM   Result Value Ref Range    Pro- 0 - 1,800 pg/mL   Comprehensive Metabolic Panel    Collection Time: 11/07/22  2:50 PM   Result Value Ref Range    Sodium 140 136 - 145 mmol/L    Potassium 4.7 3.5 - 5.0 mmol/L    Chloride 106 98 - 111 mmol/L    CO2 25 22 - 29 mmol/L    Anion Gap 9 7 - 19 mmol/L    Glucose 104 74 - 109 mg/dL    BUN 17 8 - 23 mg/dL    Creatinine 1.0 (H) 0.5 - 0.9 mg/dL    Est, Glom Filt Rate 54 (A) >60    Calcium 9.9 8.8 - 10.2 mg/dL    Total Protein 6.1 (L) 6.6 - 8.7 g/dL    Albumin 4.1 3.5 - 5.2 g/dL    Total Bilirubin <0.2 0.2 - 1.2 mg/dL    Alkaline Phosphatase 102 35 - 104 U/L    ALT 17 5 - 33 U/L    AST 21 5 - 32 U/L               Assessment & Plan: The following diagnoses and conditions are stable with no further orders unless indicated:  1. Essential (primary) hypertension  - Blood pressure stable today. Continue with lisinopril 20 mg twice daily and spironolactone 25 mg daily  Also 1 Lopressor 50 mg twice daily  We will check laboratory work today to monitor renal function and potassium      No orders of the defined types were placed in this encounter. Return in about 4 months (around 3/7/2023) for Routine follow up - 20 minutes. Discussed use, benefit, and side effects of prescribed medications. All patient questions answered. Pt voiced understanding. Reviewed health maintenance. Instructedto continue current medications, diet and exercise. Patient agreed with treatmentplan.  Follow up as directed.     _______________________________________________________________      Past Medical History:   Diagnosis Date    Breast cyst     Cancer ([facility] [address]     surgery only    Colon polyp     Family history of colon cancer 6/17/2016    GERD (gastroesophageal reflux disease)     History of double vision     with surgical correction    Hypertension     Thyroid disease       Past Surgical History:   Procedure Laterality Date    ANKLE FRACTURE SURGERY Right     BACK SURGERY      plating    BREAST RECONSTRUCTION      césar. mastectomy with replacement    COLONOSCOPY  04/19/2007    Dr. Bo Mooney ([facility]) N/A 5/25/2016 Dr Dutton [place] non-obstructing simple appearing pancreatic cyst of 2.2 x 1.9mm-no high risk features were seen    EYE SURGERY      recent    HYSTERECTOMY (CERVIX STATUS UNKNOWN)      [place] TOTAL KNEE ARTHROPLASTY Left 10/17/2017    KNEE TOTAL ARTHROPLASTY performed by Anya Laughlin MD at [address] [place] [address]      TUBAL LIGATION      UPPER GASTROINTESTINAL ENDOSCOPY N/A 2016    Dr NNEKA Bhatia-gastritis/gastropathy       Family History   Problem Relation Age of Onset    Colon Cancer Mother     Colon Polyps Mother     Rectal Cancer Mother 80    Breast Cancer Daughter 64        Had bilateral mastectomy    Esophageal Cancer Neg Hx     Liver Disease Neg Hx     Lung Cancer Neg Hx     Stomach Cancer Neg Hx        Social History     Tobacco Use    Smoking status: Former     Packs/day: 1.00     Years: 17.00     Pack years: 17.00     Types: Cigarettes     Quit date: 1985     Years since quittin.4    Smokeless tobacco: Never   Substance Use Topics    Alcohol use: Yes     Comment: rare     Current Outpatient Medications   Medication Sig Dispense Refill    spironolactone (ALDACTONE) 25 MG tablet Take 1 tablet by mouth daily 30 tablet 5    metoprolol tartrate (LOPRESSOR) 50 MG tablet Take 1 tablet by mouth twice daily 180 tablet 3    EUTHYROX 50 MCG tablet Take 1 tablet by mouth once daily 90 tablet 3    lisinopril (PRINIVIL;ZESTRIL) 20 MG tablet Take 2 tablets by mouth once daily 180 tablet 3    rosuvastatin (CRESTOR) 5 MG tablet Take 1 tablet by mouth daily 30 tablet 5    TURMERIC PO Take 1,000 mg by mouth daily       Coenzyme Q10 (COQ10) 200 MG CAPS Take 1 tablet by mouth daily      Cholecalciferol (VITAMIN D3) 2000 units TABS Take by mouth      Probiotic Product (PROBIOTIC-10 PO) Take by mouth      NONFORMULARY Take 1 tablet by mouth daily Indications: focus factor      NONFORMULARY Indications: cbd oil      docusate sodium (COLACE) 100 MG capsule Take 1 capsule by mouth daily To prevent constipation 20 capsule 0    Multiple Vitamin (MULTIVITAMIN PO) Take 1 tablet by mouth daily       DiphenhydrAMINE HCl (BENADRYL ALLERGY PO) Take 1 tablet by mouth daily as needed Indications: Seasonal Allergy       No current facility-administered medications for this visit. Allergies   Allergen Reactions    Demerol Other (See Comments)     Abdominal spasms with severe n/v/    Meperidine Other (See Comments)    Nsaids Other (See Comments)     Dark tarry stools    Stadol [Butorphanol Tartrate]     Sulfa Antibiotics        Health Maintenance   Topic Date Due    Flu vaccine (1) Never done    DTaP/Tdap/Td vaccine (1 - Tdap) 07/21/2023 (Originally 3/19/1953)    Shingles vaccine (1 of 2) 07/21/2023 (Originally [birth year]/19/1984)    Lipids  02/14/2023    Depression Monitoring  07/21/2023    Annual Wellness Visit (AWV)  07/22/2023    Colorectal Cancer Screen  Completed    Pneumococcal 65+ years Vaccine  Completed    COVID-19 Vaccine  Completed    Hepatitis A vaccine  Aged Out    Hib vaccine  Aged Out    Meningococcal (ACWY) vaccine  Aged Out       _______________________________________________________________    Note dictated using Dragon Dictation software  Sometimes this dictation software makes erroneous transcriptions.

## 2023-01-09 ENCOUNTER — OFFICE VISIT (OUTPATIENT)
Dept: PRIMARY CARE CLINIC | Age: 88
End: 2023-01-09
Payer: MEDICARE

## 2023-01-09 VITALS
WEIGHT: 141.6 LBS | DIASTOLIC BLOOD PRESSURE: 84 MMHG | SYSTOLIC BLOOD PRESSURE: 138 MMHG | HEART RATE: 59 BPM | BODY MASS INDEX: 25.09 KG/M2 | OXYGEN SATURATION: 98 % | HEIGHT: 63 IN | TEMPERATURE: 97 F

## 2023-01-09 DIAGNOSIS — I10 ESSENTIAL (PRIMARY) HYPERTENSION: ICD-10-CM

## 2023-01-09 DIAGNOSIS — N18.31 STAGE 3A CHRONIC KIDNEY DISEASE (HCC): ICD-10-CM

## 2023-01-09 DIAGNOSIS — I47.1 PAROXYSMAL SVT (SUPRAVENTRICULAR TACHYCARDIA) (HCC): ICD-10-CM

## 2023-01-09 DIAGNOSIS — B02.9 HERPES ZOSTER WITHOUT COMPLICATION: Primary | ICD-10-CM

## 2023-01-09 PROCEDURE — G8484 FLU IMMUNIZE NO ADMIN: HCPCS | Performed by: FAMILY MEDICINE

## 2023-01-09 PROCEDURE — G8417 CALC BMI ABV UP PARAM F/U: HCPCS | Performed by: FAMILY MEDICINE

## 2023-01-09 PROCEDURE — 99213 OFFICE O/P EST LOW 20 MIN: CPT | Performed by: FAMILY MEDICINE

## 2023-01-09 PROCEDURE — 1090F PRES/ABSN URINE INCON ASSESS: CPT | Performed by: FAMILY MEDICINE

## 2023-01-09 PROCEDURE — 1123F ACP DISCUSS/DSCN MKR DOCD: CPT | Performed by: FAMILY MEDICINE

## 2023-01-09 PROCEDURE — G8427 DOCREV CUR MEDS BY ELIG CLIN: HCPCS | Performed by: FAMILY MEDICINE

## 2023-01-09 PROCEDURE — 1036F TOBACCO NON-USER: CPT | Performed by: FAMILY MEDICINE

## 2023-01-09 RX ORDER — VALACYCLOVIR HYDROCHLORIDE 1 G/1
1000 TABLET, FILM COATED ORAL 3 TIMES DAILY
Qty: 21 TABLET | Refills: 0 | Status: SHIPPED | OUTPATIENT
Start: 2023-01-09 | End: 2023-01-16

## 2023-01-09 NOTE — PROGRESS NOTES
[address] PRIMARY CARE  [address] [address] [address]  Dept: [phone]  Dept Fax: [phone]  Loc: [phone]    Jose Altamirano is a 80 y.o. female who presents today for her medical conditions/complaints as noted below. Jose Altamirano is here for Herpes Zoster        HPI:   CC: Here today to discuss the following:      She developed a rash on the left flank rating to her left groin about 1 week ago. HPI    Subjective:      Review of Systems  General: No fever chills  Skin: Rash  SeeHPI for visit specific review of symptoms. All others negative      Objective:   /84 (Site: Left Upper Arm, Position: Sitting)   Pulse 59   Temp 97 °F (36.1 °C) (Temporal)   Ht 5' 3\" (1.6 m)   Wt 141 lb 9.6 oz (64.2 kg)   SpO2 98%   BMI 25.08 kg/m²   Physical Exam  General: No acute distress  Skin: Erythematous vesicles along the L2-L3 dermatome on the left. No results found for this or any previous visit (from the past 672 hour(s)). Assessment & Plan: The following diagnoses and conditions are stable with no further orders unless indicated:  1. Herpes zoster without complication  - valACYclovir (VALTREX) 1 g tablet; Take 1 tablet by mouth 3 times daily for 7 days  Dispense: 21 tablet; Refill: 0  -Overall she feels her pain is adequately controlled. -Discussed options for management including gabapentin, amitriptyline, tramadol among others. She is had adverse effects of these medications which include drowsiness and affecting her memory.  -Also offered Lidoderm patches. She states she will consider. 2.  Hypertension  Blood pressure is higher than last visit but she does have shingles today. We will continue to monitor    3. Paroxysmal SVT (supraventricular tachycardia) (HCC)  *Blood pressure and heart rate of been stable. Continue with Lopressor 50 mg twice a day    4.  Stage 3a chronic kidney disease ([facility] [address]  Lab Results Component Value Date    CREATININE 1.0 (H) 11/07/2022     Lab Results   Component Value Date    BUN 17 11/07/2022   Encouraged to continue with adequate hydration            No orders of the defined types were placed in this encounter. No follow-ups on file. Discussed use, benefit, and side effects of prescribed medications. All patient questions answered. Pt voiced understanding. Reviewed health maintenance. Instructedto continue current medications, diet and exercise. Patient agreed with treatmentplan.  Follow up as directed.     _______________________________________________________________      Past Medical History:   Diagnosis Date    Breast cyst     Cancer ([address]     surgery only    Colon polyp     Family history of colon cancer 6/17/2016    GERD (gastroesophageal reflux disease)     History of double vision     with surgical correction    Hypertension     Thyroid disease       Past Surgical History:   Procedure Laterality Date    ANKLE FRACTURE SURGERY Right     BACK SURGERY      plating    BREAST RECONSTRUCTION      césar. mastectomy with replacement    COLONOSCOPY  04/19/2007    Dr. Jose Ochoa ([facility]) N/A 5/25/2016    Dr Michael Dee non-obstructing simple appearing pancreatic cyst of 2.2 x 1.9mm-no high risk features were seen    EYE SURGERY      recent    HYSTERECTOMY (CERVIX STATUS UNKNOWN)      [place] ARTHRP KNE CONDYLE&PLATU MEDIAL&LAT COMPARTMENTS Left 10/17/2017    KNEE TOTAL ARTHROPLASTY performed by Katlyn Jon MD at [address] [facility] ENDOSCOPY N/A 5/25/2016    Dr Marv Bhatia-gastritis/gastropathy       Family History   Problem Relation Age of Onset    Colon Cancer Mother     Colon Polyps Mother     Rectal Cancer Mother 80    Breast Cancer Daughter 64        Had bilateral mastectomy    Esophageal Cancer Neg Hx     Liver Disease Neg Hx     Lung Cancer Neg Hx     Stomach Cancer Neg Hx        Social History     Tobacco Use    Smoking status: Former     Packs/day: 1.00     Years: 17.00     Pack years: 17.00     Types: Cigarettes     Quit date: 1985     Years since quittin.6    Smokeless tobacco: Never   Substance Use Topics    Alcohol use: Yes     Comment: rare     Current Outpatient Medications   Medication Sig Dispense Refill    valACYclovir (VALTREX) 1 g tablet Take 1 tablet by mouth 3 times daily for 7 days 21 tablet 0    spironolactone (ALDACTONE) 25 MG tablet Take 1 tablet by mouth daily 30 tablet 5    metoprolol tartrate (LOPRESSOR) 50 MG tablet Take 1 tablet by mouth twice daily 180 tablet 3    EUTHYROX 50 MCG tablet Take 1 tablet by mouth once daily 90 tablet 3    lisinopril (PRINIVIL;ZESTRIL) 20 MG tablet Take 2 tablets by mouth once daily 180 tablet 3    rosuvastatin (CRESTOR) 5 MG tablet Take 1 tablet by mouth daily 30 tablet 5    TURMERIC PO Take 1,000 mg by mouth daily       Coenzyme Q10 (COQ10) 200 MG CAPS Take 1 tablet by mouth daily      Cholecalciferol (VITAMIN D3) 2000 units TABS Take by mouth      Probiotic Product (PROBIOTIC-10 PO) Take by mouth      NONFORMULARY Take 1 tablet by mouth daily Indications: focus factor      NONFORMULARY Indications: cbd oil      docusate sodium (COLACE) 100 MG capsule Take 1 capsule by mouth daily To prevent constipation 20 capsule 0    Multiple Vitamin (MULTIVITAMIN PO) Take 1 tablet by mouth daily       DiphenhydrAMINE HCl (BENADRYL ALLERGY PO) Take 1 tablet by mouth daily as needed Indications: Seasonal Allergy       No current facility-administered medications for this visit.      Allergies   Allergen Reactions    Demerol Other (See Comments)     Abdominal spasms with severe n/v/    Meperidine Other (See Comments)    Nsaids Other (See Comments)     Dark tarry stools    Stadol [Butorphanol Tartrate]     Sulfa Antibiotics        Health Maintenance   Topic Date Due    Flu vaccine (1) Never done    DTaP/Tdap/Td vaccine (1 - Tdap) 07/21/2023 (Originally 3/19/1953)    Shingles vaccine (1 of 2) 07/21/2023 (Originally 3/19/1984)    Lipids  02/14/2023    Depression Monitoring  07/21/2023    Annual Wellness Visit (AWV)  07/22/2023    Colorectal Cancer Screen  Completed    Pneumococcal 65+ years Vaccine  Completed    COVID-19 Vaccine  Completed    Hepatitis A vaccine  Aged Out    Hib vaccine  Aged Out    Meningococcal (ACWY) vaccine  Aged Out       _______________________________________________________________    Note dictated using Dragon Dictation software  Sometimes this dictation software makes erroneous transcriptions.

## 2023-03-07 ENCOUNTER — OFFICE VISIT (OUTPATIENT)
Dept: PRIMARY CARE CLINIC | Age: 88
End: 2023-03-07
Payer: MEDICARE

## 2023-03-07 VITALS
TEMPERATURE: 97.2 F | BODY MASS INDEX: 24.09 KG/M2 | WEIGHT: 136 LBS | OXYGEN SATURATION: 97 % | DIASTOLIC BLOOD PRESSURE: 70 MMHG | HEART RATE: 71 BPM | SYSTOLIC BLOOD PRESSURE: 139 MMHG

## 2023-03-07 DIAGNOSIS — E06.3 HYPOTHYROIDISM DUE TO HASHIMOTO'S THYROIDITIS: Primary | ICD-10-CM

## 2023-03-07 DIAGNOSIS — F33.42 RECURRENT MAJOR DEPRESSIVE DISORDER, IN FULL REMISSION (HCC): ICD-10-CM

## 2023-03-07 DIAGNOSIS — E78.01 FAMILIAL HYPERCHOLESTEROLEMIA: ICD-10-CM

## 2023-03-07 DIAGNOSIS — E03.8 HYPOTHYROIDISM DUE TO HASHIMOTO'S THYROIDITIS: Primary | ICD-10-CM

## 2023-03-07 DIAGNOSIS — I10 ESSENTIAL (PRIMARY) HYPERTENSION: ICD-10-CM

## 2023-03-07 PROCEDURE — 1036F TOBACCO NON-USER: CPT | Performed by: FAMILY MEDICINE

## 2023-03-07 PROCEDURE — G8420 CALC BMI NORM PARAMETERS: HCPCS | Performed by: FAMILY MEDICINE

## 2023-03-07 PROCEDURE — 1123F ACP DISCUSS/DSCN MKR DOCD: CPT | Performed by: FAMILY MEDICINE

## 2023-03-07 PROCEDURE — G8484 FLU IMMUNIZE NO ADMIN: HCPCS | Performed by: FAMILY MEDICINE

## 2023-03-07 PROCEDURE — 99213 OFFICE O/P EST LOW 20 MIN: CPT | Performed by: FAMILY MEDICINE

## 2023-03-07 PROCEDURE — 1090F PRES/ABSN URINE INCON ASSESS: CPT | Performed by: FAMILY MEDICINE

## 2023-03-07 PROCEDURE — G8427 DOCREV CUR MEDS BY ELIG CLIN: HCPCS | Performed by: FAMILY MEDICINE

## 2023-03-07 SDOH — ECONOMIC STABILITY: INCOME INSECURITY: HOW HARD IS IT FOR YOU TO PAY FOR THE VERY BASICS LIKE FOOD, HOUSING, MEDICAL CARE, AND HEATING?: NOT VERY HARD

## 2023-03-07 SDOH — ECONOMIC STABILITY: FOOD INSECURITY: WITHIN THE PAST 12 MONTHS, THE FOOD YOU BOUGHT JUST DIDN'T LAST AND YOU DIDN'T HAVE MONEY TO GET MORE.: NEVER TRUE

## 2023-03-07 SDOH — ECONOMIC STABILITY: HOUSING INSECURITY
IN THE LAST 12 MONTHS, WAS THERE A TIME WHEN YOU DID NOT HAVE A STEADY PLACE TO SLEEP OR SLEPT IN A SHELTER (INCLUDING NOW)?: NO

## 2023-03-07 SDOH — ECONOMIC STABILITY: FOOD INSECURITY: WITHIN THE PAST 12 MONTHS, YOU WORRIED THAT YOUR FOOD WOULD RUN OUT BEFORE YOU GOT MONEY TO BUY MORE.: NEVER TRUE

## 2023-03-07 ASSESSMENT — PATIENT HEALTH QUESTIONNAIRE - PHQ9
7. TROUBLE CONCENTRATING ON THINGS, SUCH AS READING THE NEWSPAPER OR WATCHING TELEVISION: 0
3. TROUBLE FALLING OR STAYING ASLEEP: 1
2. FEELING DOWN, DEPRESSED OR HOPELESS: 1
SUM OF ALL RESPONSES TO PHQ QUESTIONS 1-9: 5
SUM OF ALL RESPONSES TO PHQ QUESTIONS 1-9: 5
6. FEELING BAD ABOUT YOURSELF - OR THAT YOU ARE A FAILURE OR HAVE LET YOURSELF OR YOUR FAMILY DOWN: 0
9. THOUGHTS THAT YOU WOULD BE BETTER OFF DEAD, OR OF HURTING YOURSELF: 0
10. IF YOU CHECKED OFF ANY PROBLEMS, HOW DIFFICULT HAVE THESE PROBLEMS MADE IT FOR YOU TO DO YOUR WORK, TAKE CARE OF THINGS AT HOME, OR GET ALONG WITH OTHER PEOPLE: 0
SUM OF ALL RESPONSES TO PHQ QUESTIONS 1-9: 5
SUM OF ALL RESPONSES TO PHQ9 QUESTIONS 1 & 2: 2
8. MOVING OR SPEAKING SO SLOWLY THAT OTHER PEOPLE COULD HAVE NOTICED. OR THE OPPOSITE, BEING SO FIGETY OR RESTLESS THAT YOU HAVE BEEN MOVING AROUND A LOT MORE THAN USUAL: 1
4. FEELING TIRED OR HAVING LITTLE ENERGY: 1
5. POOR APPETITE OR OVEREATING: 0
SUM OF ALL RESPONSES TO PHQ QUESTIONS 1-9: 5
1. LITTLE INTEREST OR PLEASURE IN DOING THINGS: 1

## 2023-03-07 NOTE — PROGRESS NOTES
[address] [place] [facility]  [address] [address] [address]  Dept: [phone]  Dept Fax: [phone]  Loc: [phone]    Franck Ybarra is a 80 y.o. female who presents today for her medical conditions/complaints as noted below. Franck Ybarra is here for Follow-up (4 month)        HPI:   CC: Here today to discuss the following:    Hypothyroidism  Symptoms are stable. No temperature intolerance, fatigue, or mood disturbance from baseline. Complaint with current medication. Hypertension  Compliant with medications. No adverse effects from medication. No lightheadedness, palpitations, or chest pain. Hyperlipidemia  Tolerating current cholesterol medication without side effects. . Attempting to reduce processed sugar and cholesterol from diet. Last visit she was here in January with shingles. Symptoms resolved      HPI    Subjective:      Review of Systems  Review of Systems   Constitutional: Negative for chills and fever. HENT: Negative for congestion. Respiratory: Negative for cough, chest tightness and shortness of breath. Cardiovascular: Negative for chest pain, palpitations and leg swelling. Gastrointestinal: Negative for abdominal pain, anal bleeding, constipation, diarrhea and nausea. SeeHPI for visit specific review of symptoms. All others negative      Objective:   /70   Pulse 71   Temp 97.2 °F (36.2 °C)   Wt 136 lb (61.7 kg)   SpO2 97%   BMI 24.09 kg/m²   Physical Exam  Physical Exam   Constitutional: She appears well. Does not appear ill. Neck: Neck supple. No masses. Neck Symmetric. Normal tracheal position. No thyroid enlargement  Cardiovascular: Normal rate and regular rhythm. Exam reveals no friction rub. No murmur heard. Respiratory:  Effort normal and breath sounds normal. No respiratory distress. No wheezes. No rales. No use of accessory muscles or intercostal retractions. Neurological: alert. Psychiatric: normal mood and affect. Her behavior is normal.       No results found for this or any previous visit (from the past 672 hour(s)). Assessment & Plan: The following diagnoses and conditions are stable with no further orders unless indicated:  1. Hypothyroidism due to Hashimoto's thyroiditis  Current medication:  Levothyroxine 50 mcg daily  Lab Results   Component Value Date    TSH 1.030 10/25/2022    T4FREE 1.10 10/25/2022       2. Recurrent major [facility]  Stable    3. Essential (primary) hypertension  Current medication:  Lisinopril 40 mg daily  Metoprolol 50 mg twice daily  Spironolactone 25 mg daily    BP Readings from Last 3 Encounters:   03/07/23 139/70   01/09/23 138/84   11/07/22 128/80   Stable. 4. Familial hypercholesterolemia  Current medication:  Crestor 5 mg daily  Recheck next visit      Orders Placed This Encounter   Procedures    CBC with Auto Differential     Standing Status:   Future     Standing Expiration Date:   3/6/2024    Comprehensive Metabolic Panel     Standing Status:   Future     Standing Expiration Date:   3/6/2024    Lipid Panel     Standing Status:   Future     Standing Expiration Date:   3/6/2024     Order Specific Question:   Is Patient Fasting?/# of Hours     Answer:   12    TSH     Standing Status:   Future     Standing Expiration Date:   3/6/2024    T4, Free     Standing Status:   Future     Standing Expiration Date:   3/6/2024           Return in about 6 months (around 9/7/2023) for Routine follow up - 20 minutes. Discussed use, benefit, and side effects of prescribed medications. All patient questions answered. Pt voiced understanding. Reviewed health maintenance. Instructedto continue current medications, diet and exercise. Patient agreed with treatmentplan.  Follow up as directed.     _______________________________________________________________      Past Medical History:   Diagnosis Date    Breast cyst     Cancer [facility])     surgery only    Colon polyp     Family history of colon cancer 2016    GERD (gastroesophageal reflux disease)     History of double vision     with surgical correction    Hypertension     Thyroid disease       Past Surgical History:   Procedure Laterality Date    ANKLE FRACTURE SURGERY Right     BACK SURGERY      plating    BREAST RECONSTRUCTION      césar. mastectomy with replacement    COLONOSCOPY  2007    Dr. Valerie Gutierrez ([facility]) N/A 2016    Dr Rohan Self non-obstructing simple appearing pancreatic cyst of 2.2 x 1.9mm-no high risk features were seen    EYE SURGERY      recent    HYSTERECTOMY (CERVIX STATUS UNKNOWN)      IN ARTHRP KNE CONDYLE&PLATU MEDIAL&LAT COMPARTMENTS Left 10/17/2017    KNEE TOTAL ARTHROPLASTY performed by Luciano Villa MD at [address] [facility] ENDOSCOPY N/A 2016    Dr NNEKA Bhatia-gastritis/gastropathy       Family History   Problem Relation Age of Onset    Colon Cancer Mother     Colon Polyps Mother     Rectal Cancer Mother 80    Breast Cancer Daughter 64        Had bilateral mastectomy    Esophageal Cancer Neg Hx     Liver Disease Neg Hx     Lung Cancer Neg Hx     Stomach Cancer Neg Hx        Social History     Tobacco Use    Smoking status: Former     Packs/day: 1.00     Years: 17.00     Pack years: 17.00     Types: Cigarettes     Quit date: 1985     Years since quittin.8    Smokeless tobacco: Never   Substance Use Topics    Alcohol use: Yes     Comment: rare     Current Outpatient Medications   Medication Sig Dispense Refill    spironolactone (ALDACTONE) 25 MG tablet Take 1 tablet by mouth daily 30 tablet 5    metoprolol tartrate (LOPRESSOR) 50 MG tablet Take 1 tablet by mouth twice daily 180 tablet 3    EUTHYROX 50 MCG tablet Take 1 tablet by mouth once daily 90 tablet 3    lisinopril (PRINIVIL;ZESTRIL) 20 MG tablet Take 2 tablets by mouth once daily 180 tablet 3    rosuvastatin (CRESTOR) 5 MG tablet Take 1 tablet by mouth daily 30 tablet 5    TURMERIC PO Take 1,000 mg by mouth daily       Coenzyme Q10 (COQ10) 200 MG CAPS Take 1 tablet by mouth daily      Cholecalciferol (VITAMIN D3) 2000 units TABS Take by mouth      Probiotic Product (PROBIOTIC-10 PO) Take by mouth      NONFORMULARY Take 1 tablet by mouth daily Indications: focus factor      NONFORMULARY Indications: cbd oil      docusate sodium (COLACE) 100 MG capsule Take 1 capsule by mouth daily To prevent constipation 20 capsule 0    Multiple Vitamin (MULTIVITAMIN PO) Take 1 tablet by mouth daily       DiphenhydrAMINE HCl (BENADRYL ALLERGY PO) Take 1 tablet by mouth daily as needed Indications: Seasonal Allergy       No current facility-administered medications for this visit. Allergies   Allergen Reactions    Demerol Other (See Comments)     Abdominal spasms with severe n/v/    Meperidine Other (See Comments)    Nsaids Other (See Comments)     Dark tarry stools    Stadol [Butorphanol Tartrate]     Sulfa Antibiotics        Health Maintenance   Topic Date Due    Flu vaccine (1) Never done    Lipids  02/14/2023    DTaP/Tdap/Td vaccine (1 - Tdap) 07/21/2023 (Originally 3/19/1953)    Shingles vaccine (1 of 2) 07/21/2023 (Originally 3/19/1984)    Depression Monitoring  07/21/2023    Annual Wellness Visit (AWV)  07/22/2023    Colorectal Cancer Screen  Completed    Pneumococcal 65+ years Vaccine  Completed    COVID-19 Vaccine  Completed    Hepatitis A vaccine  Aged Out    Hib vaccine  Aged Out    Meningococcal (ACWY) vaccine  Aged Out       _______________________________________________________________    Note dictated using Dragon Dictation software  Sometimes this dictation software makes erroneous transcriptions.

## 2023-03-10 DIAGNOSIS — E78.2 MIXED HYPERLIPIDEMIA: ICD-10-CM

## 2023-03-10 RX ORDER — ROSUVASTATIN CALCIUM 5 MG/1
5 TABLET, COATED ORAL DAILY
Qty: 30 TABLET | Refills: 5 | Status: SHIPPED | OUTPATIENT
Start: 2023-03-10

## 2023-03-10 NOTE — TELEPHONE ENCOUNTER
Benson Stockton called requesting a refill of the below medication which has been pended for you:     Requested Prescriptions     Pending Prescriptions Disp Refills    rosuvastatin (CRESTOR) 5 MG tablet 30 tablet 5     Sig: Take 1 tablet by mouth daily       Last Appointment Date: 3/7/2023  Next Appointment Date: Visit date not found    Allergies   Allergen Reactions    Demerol Other (See Comments)     Abdominal spasms with severe n/v/    Meperidine Other (See Comments)    Nsaids Other (See Comments)     Dark tarry stools    Stadol [Butorphanol Tartrate]     Sulfa Antibiotics

## 2023-05-02 RX ORDER — SPIRONOLACTONE 25 MG/1
TABLET ORAL
Qty: 90 TABLET | Refills: 1 | Status: SHIPPED | OUTPATIENT
Start: 2023-05-02

## 2023-09-21 DIAGNOSIS — E78.2 MIXED HYPERLIPIDEMIA: ICD-10-CM

## 2023-09-21 RX ORDER — ROSUVASTATIN CALCIUM 5 MG/1
5 TABLET, COATED ORAL DAILY
Qty: 90 TABLET | Refills: 0 | OUTPATIENT
Start: 2023-09-21

## 2023-10-02 DIAGNOSIS — E78.2 MIXED HYPERLIPIDEMIA: ICD-10-CM

## 2023-10-02 RX ORDER — ROSUVASTATIN CALCIUM 5 MG/1
5 TABLET, COATED ORAL DAILY
Qty: 90 TABLET | Refills: 0 | OUTPATIENT
Start: 2023-10-02

## 2024-05-23 ENCOUNTER — OFFICE VISIT (OUTPATIENT)
Dept: NEUROLOGY | Age: 89
End: 2024-05-23
Payer: MEDICARE

## 2024-05-23 VITALS
HEART RATE: 88 BPM | HEIGHT: 63 IN | OXYGEN SATURATION: 97 % | WEIGHT: 136 LBS | BODY MASS INDEX: 24.1 KG/M2 | SYSTOLIC BLOOD PRESSURE: 124 MMHG | DIASTOLIC BLOOD PRESSURE: 76 MMHG

## 2024-05-23 DIAGNOSIS — R41.3 MEMORY LOSS: ICD-10-CM

## 2024-05-23 DIAGNOSIS — R41.3 MEMORY LOSS: Primary | ICD-10-CM

## 2024-05-23 LAB
ALBUMIN SERPL-MCNC: 4.1 G/DL (ref 3.5–5.2)
ALP SERPL-CCNC: 107 U/L (ref 35–104)
ALT SERPL-CCNC: 12 U/L (ref 5–33)
ANION GAP SERPL CALCULATED.3IONS-SCNC: 8 MMOL/L (ref 7–19)
AST SERPL-CCNC: 19 U/L (ref 5–32)
BASOPHILS # BLD: 0.1 K/UL (ref 0–0.2)
BASOPHILS NFR BLD: 1.1 % (ref 0–1)
BILIRUB SERPL-MCNC: 0.3 MG/DL (ref 0.2–1.2)
BUN SERPL-MCNC: 22 MG/DL (ref 8–23)
CALCIUM SERPL-MCNC: 9.8 MG/DL (ref 8.8–10.2)
CHLORIDE SERPL-SCNC: 101 MMOL/L (ref 98–111)
CO2 SERPL-SCNC: 27 MMOL/L (ref 22–29)
CREAT SERPL-MCNC: 1.1 MG/DL (ref 0.5–0.9)
CRP SERPL HS-MCNC: <0.3 MG/DL (ref 0–0.5)
EOSINOPHIL # BLD: 0.2 K/UL (ref 0–0.6)
EOSINOPHIL NFR BLD: 2.5 % (ref 0–5)
ERYTHROCYTE [DISTWIDTH] IN BLOOD BY AUTOMATED COUNT: 18.2 % (ref 11.5–14.5)
ERYTHROCYTE [SEDIMENTATION RATE] IN BLOOD BY WESTERGREN METHOD: 23 MM/HR (ref 0–25)
GLUCOSE SERPL-MCNC: 102 MG/DL (ref 74–109)
HCT VFR BLD AUTO: 35.8 % (ref 37–47)
HGB BLD-MCNC: 11.1 G/DL (ref 12–16)
IMM GRANULOCYTES # BLD: 0.1 K/UL
LYMPHOCYTES # BLD: 2 K/UL (ref 1.1–4.5)
LYMPHOCYTES NFR BLD: 21.6 % (ref 20–40)
MCH RBC QN AUTO: 28.5 PG (ref 27–31)
MCHC RBC AUTO-ENTMCNC: 31 G/DL (ref 33–37)
MCV RBC AUTO: 92 FL (ref 81–99)
MONOCYTES # BLD: 1 K/UL (ref 0–0.9)
MONOCYTES NFR BLD: 11 % (ref 0–10)
NEUTROPHILS # BLD: 5.7 K/UL (ref 1.5–7.5)
NEUTS SEG NFR BLD: 63.1 % (ref 50–65)
PLATELET # BLD AUTO: 311 K/UL (ref 130–400)
PMV BLD AUTO: 9.5 FL (ref 9.4–12.3)
POTASSIUM SERPL-SCNC: 4.5 MMOL/L (ref 3.5–5)
PROT SERPL-MCNC: 6.9 G/DL (ref 6.6–8.7)
RBC # BLD AUTO: 3.89 M/UL (ref 4.2–5.4)
RPR SER QL: NORMAL
SODIUM SERPL-SCNC: 136 MMOL/L (ref 136–145)
TSH SERPL DL<=0.005 MIU/L-ACNC: 1.65 UIU/ML (ref 0.27–4.2)
VIT B12 SERPL-MCNC: 798 PG/ML (ref 211–946)
WBC # BLD AUTO: 9.1 K/UL (ref 4.8–10.8)

## 2024-05-23 PROCEDURE — 1123F ACP DISCUSS/DSCN MKR DOCD: CPT | Performed by: NURSE PRACTITIONER

## 2024-05-23 PROCEDURE — G8427 DOCREV CUR MEDS BY ELIG CLIN: HCPCS | Performed by: NURSE PRACTITIONER

## 2024-05-23 PROCEDURE — G8420 CALC BMI NORM PARAMETERS: HCPCS | Performed by: NURSE PRACTITIONER

## 2024-05-23 PROCEDURE — 1036F TOBACCO NON-USER: CPT | Performed by: NURSE PRACTITIONER

## 2024-05-23 PROCEDURE — 99214 OFFICE O/P EST MOD 30 MIN: CPT | Performed by: NURSE PRACTITIONER

## 2024-05-23 PROCEDURE — 1090F PRES/ABSN URINE INCON ASSESS: CPT | Performed by: NURSE PRACTITIONER

## 2024-05-23 RX ORDER — AMLODIPINE BESYLATE 5 MG/1
5 TABLET ORAL DAILY
COMMUNITY
Start: 2024-05-13

## 2024-05-23 RX ORDER — QUETIAPINE FUMARATE 50 MG/1
50 TABLET, EXTENDED RELEASE ORAL EVERY EVENING
COMMUNITY
Start: 2024-05-13
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
## 2024-05-24 LAB
B BURGDOR IGG SER QL IB: NEGATIVE
B BURGDOR IGM SER QL IB: NEGATIVE

## 2024-05-25 LAB
ARSENIC BLD-MCNC: <10 UG/L
CADMIUM BLD-MCNC: <1 UG/L
LEAD BLD-MCNC: <2 UG/DL
MERCURY BLD-MCNC: <2.5 UG/L
NUCLEAR IGG SER QL IA: NORMAL

## 2024-05-28 LAB — VIT B1 BLD-MCNC: 116 NMOL/L (ref 70–180)

## 2024-06-18 ENCOUNTER — HOSPITAL ENCOUNTER (OUTPATIENT)
Dept: MRI IMAGING | Age: 89
Discharge: HOME | End: 2024-06-18
Payer: MEDICARE

## 2024-06-18 DIAGNOSIS — R41.3 MEMORY LOSS: ICD-10-CM

## 2024-06-18 PROCEDURE — 70553 MRI BRAIN STEM W/O & W/DYE: CPT

## 2024-06-18 PROCEDURE — A9577 INJ MULTIHANCE: HCPCS | Performed by: NURSE PRACTITIONER

## 2024-06-18 PROCEDURE — 6360000004 HC RX CONTRAST MEDICATION: Performed by: NURSE PRACTITIONER

## 2024-06-18 RX ADMIN — GADOBENATE DIMEGLUMINE 13 ML: 529 INJECTION, SOLUTION INTRAVENOUS at 10:06

## 2024-07-22 ENCOUNTER — TELEPHONE (OUTPATIENT)
Dept: NEUROLOGY | Age: 89
End: 2024-07-22

## 2024-07-23 ENCOUNTER — OFFICE VISIT (OUTPATIENT)
Dept: NEUROLOGY | Age: 89
End: 2024-07-23
Payer: MEDICARE

## 2024-07-23 VITALS
DIASTOLIC BLOOD PRESSURE: 67 MMHG | HEIGHT: 63 IN | BODY MASS INDEX: 24.1 KG/M2 | WEIGHT: 136 LBS | HEART RATE: 62 BPM | OXYGEN SATURATION: 99 % | SYSTOLIC BLOOD PRESSURE: 141 MMHG

## 2024-07-23 DIAGNOSIS — F03.A3 MILD DEMENTIA WITH MOOD DISTURBANCE, UNSPECIFIED DEMENTIA TYPE (HCC): Primary | ICD-10-CM

## 2024-07-23 DIAGNOSIS — I67.9 CEREBROVASCULAR SMALL VESSEL DISEASE: ICD-10-CM

## 2024-07-23 DIAGNOSIS — Z79.899 MEDICATION MANAGEMENT: ICD-10-CM

## 2024-07-23 PROCEDURE — G8427 DOCREV CUR MEDS BY ELIG CLIN: HCPCS | Performed by: NURSE PRACTITIONER

## 2024-07-23 PROCEDURE — 1036F TOBACCO NON-USER: CPT | Performed by: NURSE PRACTITIONER

## 2024-07-23 PROCEDURE — 1090F PRES/ABSN URINE INCON ASSESS: CPT | Performed by: NURSE PRACTITIONER

## 2024-07-23 PROCEDURE — G8420 CALC BMI NORM PARAMETERS: HCPCS | Performed by: NURSE PRACTITIONER

## 2024-07-23 PROCEDURE — 1123F ACP DISCUSS/DSCN MKR DOCD: CPT | Performed by: NURSE PRACTITIONER

## 2024-07-23 PROCEDURE — 99214 OFFICE O/P EST MOD 30 MIN: CPT | Performed by: NURSE PRACTITIONER

## 2024-07-23 RX ORDER — MEMANTINE HYDROCHLORIDE 5 MG/1
5 TABLET ORAL 2 TIMES DAILY
Qty: 60 TABLET | Refills: 3 | Status: SHIPPED | OUTPATIENT
Start: 2024-07-23
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
## 2024-07-23 NOTE — PATIENT INSTRUCTIONS
Namenda titration:     Week 1: Take 1/2 tablet in the morning   Week 2: Take 1/2 tablet in the morning and 1/2 tablet at night   Week 3: Take 1 tablet in the morning and 1/2 tablet at night   Week 4 and on: Take 1 tablet in the morning and 1 tablet at night      Biggest side effect is diarrhea.

## 2024-09-13 ENCOUNTER — HOSPITAL ENCOUNTER (EMERGENCY)
Age: 89
Discharge: HOME | End: 2024-09-13
Attending: EMERGENCY MEDICINE
Payer: MEDICARE

## 2024-09-13 ENCOUNTER — APPOINTMENT (OUTPATIENT)
Dept: GENERAL RADIOLOGY | Age: 89
End: 2024-09-13
Payer: MEDICARE

## 2024-09-13 VITALS
TEMPERATURE: 98 F | RESPIRATION RATE: 17 BRPM | DIASTOLIC BLOOD PRESSURE: 64 MMHG | OXYGEN SATURATION: 98 % | HEART RATE: 79 BPM | SYSTOLIC BLOOD PRESSURE: 135 MMHG

## 2024-09-13 DIAGNOSIS — R42 EPISODE OF DIZZINESS: Primary | ICD-10-CM

## 2024-09-13 LAB
ALBUMIN SERPL-MCNC: 4 G/DL (ref 3.5–5.2)
ALP SERPL-CCNC: 117 U/L (ref 35–104)
ALT SERPL-CCNC: 10 U/L (ref 5–33)
ANION GAP SERPL CALCULATED.3IONS-SCNC: 9 MMOL/L (ref 7–19)
AST SERPL-CCNC: 17 U/L (ref 5–32)
BASOPHILS # BLD: 0.1 K/UL (ref 0–0.2)
BASOPHILS NFR BLD: 1.3 % (ref 0–1)
BILIRUB SERPL-MCNC: 0.3 MG/DL (ref 0.2–1.2)
BUN SERPL-MCNC: 22 MG/DL (ref 8–23)
CALCIUM SERPL-MCNC: 9.6 MG/DL (ref 8.8–10.2)
CHLORIDE SERPL-SCNC: 103 MMOL/L (ref 98–111)
CO2 SERPL-SCNC: 25 MMOL/L (ref 22–29)
CREAT SERPL-MCNC: 1.2 MG/DL (ref 0.5–0.9)
EOSINOPHIL # BLD: 0.3 K/UL (ref 0–0.6)
EOSINOPHIL NFR BLD: 4 % (ref 0–5)
ERYTHROCYTE [DISTWIDTH] IN BLOOD BY AUTOMATED COUNT: 19 % (ref 11.5–14.5)
GLUCOSE SERPL-MCNC: 86 MG/DL (ref 70–99)
HCT VFR BLD AUTO: 35.7 % (ref 37–47)
HGB BLD-MCNC: 11.4 G/DL (ref 12–16)
IMM GRANULOCYTES # BLD: 0 K/UL
LYMPHOCYTES # BLD: 2.5 K/UL (ref 1.1–4.5)
LYMPHOCYTES NFR BLD: 35.2 % (ref 20–40)
MCH RBC QN AUTO: 28.5 PG (ref 27–31)
MCHC RBC AUTO-ENTMCNC: 31.9 G/DL (ref 33–37)
MCV RBC AUTO: 89.3 FL (ref 81–99)
MONOCYTES # BLD: 1 K/UL (ref 0–0.9)
MONOCYTES NFR BLD: 14.1 % (ref 0–10)
NEUTROPHILS # BLD: 3.1 K/UL (ref 1.5–7.5)
NEUTS SEG NFR BLD: 45 % (ref 50–65)
PLATELET # BLD AUTO: 290 K/UL (ref 130–400)
PMV BLD AUTO: 9.3 FL (ref 9.4–12.3)
POTASSIUM SERPL-SCNC: 4.7 MMOL/L (ref 3.5–5)
PROT SERPL-MCNC: 6.4 G/DL (ref 6.4–8.3)
RBC # BLD AUTO: 4 M/UL (ref 4.2–5.4)
SODIUM SERPL-SCNC: 137 MMOL/L (ref 136–145)
WBC # BLD AUTO: 7 K/UL (ref 4.8–10.8)

## 2024-09-13 PROCEDURE — 85025 COMPLETE CBC W/AUTO DIFF WBC: CPT

## 2024-09-13 PROCEDURE — 80053 COMPREHEN METABOLIC PANEL: CPT

## 2024-09-13 PROCEDURE — 71045 X-RAY EXAM CHEST 1 VIEW: CPT

## 2024-09-13 PROCEDURE — 93005 ELECTROCARDIOGRAM TRACING: CPT | Performed by: EMERGENCY MEDICINE

## 2024-09-13 PROCEDURE — 36415 COLL VENOUS BLD VENIPUNCTURE: CPT

## 2024-09-13 PROCEDURE — 99285 EMERGENCY DEPT VISIT HI MDM: CPT

## 2024-09-13 ASSESSMENT — ENCOUNTER SYMPTOMS
GASTROINTESTINAL NEGATIVE: 1
EYES NEGATIVE: 1
RESPIRATORY NEGATIVE: 1

## 2024-09-15 LAB
EKG P AXIS: 51 DEGREES
EKG P-R INTERVAL: 158 MS
EKG Q-T INTERVAL: 360 MS
EKG QRS DURATION: 70 MS
EKG QTC CALCULATION (BAZETT): 391 MS
EKG T AXIS: 51 DEGREES

## 2024-09-15 PROCEDURE — 93010 ELECTROCARDIOGRAM REPORT: CPT | Performed by: INTERNAL MEDICINE

## 2024-10-23 ENCOUNTER — OFFICE VISIT (OUTPATIENT)
Dept: NEUROLOGY | Age: 89
End: 2024-10-23

## 2024-10-23 VITALS
OXYGEN SATURATION: 97 % | SYSTOLIC BLOOD PRESSURE: 129 MMHG | DIASTOLIC BLOOD PRESSURE: 71 MMHG | HEART RATE: 89 BPM | BODY MASS INDEX: 24.1 KG/M2 | WEIGHT: 136 LBS | HEIGHT: 63 IN

## 2024-10-23 DIAGNOSIS — I67.9 CEREBROVASCULAR SMALL VESSEL DISEASE: ICD-10-CM

## 2024-10-23 DIAGNOSIS — Z79.899 MEDICATION MANAGEMENT: ICD-10-CM

## 2024-10-23 DIAGNOSIS — F03.A3 MILD DEMENTIA WITH MOOD DISTURBANCE, UNSPECIFIED DEMENTIA TYPE (HCC): Primary | ICD-10-CM

## 2024-10-23 NOTE — PROGRESS NOTES

## 2025-02-07 NOTE — PATIENT INSTRUCTIONS
We are committed to providing you with the best care possible. In order to help us achieve these goals please remember to bring all medications, herbal products, and over the counter supplements with you to each visit. If your provider has ordered testing for you, please be sure to follow up with our office if you have not received results within 7 days after the testing took place. *If you receive a survey after visiting one of our offices, please take time to share your experience concerning your physician office visit. These surveys are confidential and no health information about you is shared. We are eager to improve for you and we are counting on your feedback to help make that happen.
r/o labor

## 2025-03-27 NOTE — PROGRESS NOTES
Much of this encounter note is an electronic transcription/translation of spoken language to printed text. The electronic translation of spoken language may permit erroneous, or at times, nonsensical words or phrases to be inadvertently transcribed; although attempts have made to review the note for such errors, some may still exist.  Please excuse any unrecognized transcription errors and contact us if the error is unintelligible or needs documented correction.  Also, portions of this note have been copied forward, however, changed to reflect the most current clinical status of this patient.    I spent a total of 60 minutes on this encounter on 3/31/2025 to include review of labs, radiographic imaging, previous provider documentation, performing medically appropriate examination, ordering tests, ordering medication, patient education and documenting clinical information in the electronic medical record.    The patient (or guardian, if applicable) and other individuals in attendance with the patient were advised that Artificial Intelligence will be utilized during this visit to record, process the conversation to generate a clinical note, and support improvement of the AI technology. The patient (or guardian, if applicable) and other individuals in attendance at the appointment consented to the use of AI, including the recording.    Electronically signed by ELTON Aguilar on 3/31/2025 at 4:04 PM  [employer] am starting this note as a registered nurse for ELTON Beasley.

## 2025-03-31 ENCOUNTER — HOSPITAL ENCOUNTER (OUTPATIENT)
Dept: INFUSION THERAPY | Age: 89
Discharge: HOME | End: 2025-03-31
Payer: MEDICARE

## 2025-03-31 ENCOUNTER — RESULTS FOLLOW-UP (OUTPATIENT)
Dept: HEMATOLOGY | Age: 89
End: 2025-03-31

## 2025-03-31 ENCOUNTER — OFFICE VISIT (OUTPATIENT)
Dept: HEMATOLOGY | Age: 89
End: 2025-03-31
Payer: MEDICARE

## 2025-03-31 ENCOUNTER — HOSPITAL ENCOUNTER (EMERGENCY)
Age: 89
End: 2025-03-31
Payer: MEDICARE

## 2025-03-31 ENCOUNTER — HOSPITAL ENCOUNTER (OUTPATIENT)
Dept: GENERAL RADIOLOGY | Age: 89
Discharge: HOME | End: 2025-03-31
Payer: MEDICARE

## 2025-03-31 VITALS
HEART RATE: 68 BPM | RESPIRATION RATE: 18 BRPM | OXYGEN SATURATION: 98 % | DIASTOLIC BLOOD PRESSURE: 68 MMHG | TEMPERATURE: 97.7 F | SYSTOLIC BLOOD PRESSURE: 128 MMHG | WEIGHT: 126.1 LBS | HEIGHT: 61 IN | BODY MASS INDEX: 23.81 KG/M2

## 2025-03-31 DIAGNOSIS — Z85.3 PERSONAL HISTORY OF BREAST CANCER: ICD-10-CM

## 2025-03-31 DIAGNOSIS — R77.8 ABNORMAL SPEP: ICD-10-CM

## 2025-03-31 DIAGNOSIS — R77.8 ABNORMAL SPEP: Primary | ICD-10-CM

## 2025-03-31 DIAGNOSIS — N18.31 STAGE 3A CHRONIC KIDNEY DISEASE (HCC): ICD-10-CM

## 2025-03-31 DIAGNOSIS — D53.8 OTHER SPECIFIED NUTRITIONAL ANEMIAS: ICD-10-CM

## 2025-03-31 LAB
ALBUMIN SERPL-MCNC: 4.2 G/DL (ref 3.5–5.2)
ALP SERPL-CCNC: 102 U/L (ref 35–104)
ALT SERPL-CCNC: 9 U/L (ref 5–33)
ANION GAP SERPL CALCULATED.3IONS-SCNC: 11 MMOL/L (ref 7–19)
AST SERPL-CCNC: 21 U/L (ref 5–32)
BASOPHILS # BLD: 0.05 K/UL (ref 0–0.2)
BASOPHILS NFR BLD: 0.7 % (ref 0–1)
BILIRUB SERPL-MCNC: 0.4 MG/DL (ref 0–1.2)
BUN SERPL-MCNC: 23 MG/DL (ref 8–23)
CALCIUM SERPL-MCNC: 9.7 MG/DL (ref 8.2–9.6)
CHLORIDE SERPL-SCNC: 100 MMOL/L (ref 98–107)
CO2 SERPL-SCNC: 24 MMOL/L (ref 22–29)
CREAT SERPL-MCNC: 1.3 MG/DL (ref 0.5–0.9)
EOSINOPHIL # BLD: 0.23 K/UL (ref 0–0.6)
EOSINOPHIL NFR BLD: 3.2 % (ref 0–5)
ERYTHROCYTE [DISTWIDTH] IN BLOOD BY AUTOMATED COUNT: 19.3 % (ref 11.5–14.5)
FERRITIN SERPL-MCNC: 245 NG/ML (ref 13–150)
FOLATE SERPL-MCNC: 18.9 NG/ML (ref 4.8–37.3)
GLUCOSE SERPL-MCNC: 93 MG/DL (ref 70–99)
HCT VFR BLD AUTO: 34.7 % (ref 37–47)
HGB BLD-MCNC: 11.2 G/DL (ref 12–16)
IRON SATN MFR SERPL: 36 % (ref 15–50)
IRON SERPL-MCNC: 112 UG/DL (ref 37–145)
LDH SERPL-CCNC: 143 U/L (ref 135–214)
LYMPHOCYTES # BLD: 1.78 K/UL (ref 1.1–4.5)
LYMPHOCYTES NFR BLD: 24.8 % (ref 20–40)
MCH RBC QN AUTO: 28.6 PG (ref 27–31)
MCHC RBC AUTO-ENTMCNC: 32.3 G/DL (ref 33–37)
MCV RBC AUTO: 88.5 FL (ref 81–99)
MONOCYTES # BLD: 0.78 K/UL (ref 0–0.9)
MONOCYTES NFR BLD: 10.9 % (ref 1–10)
NEUTROPHILS # BLD: 4.31 K/UL (ref 1.5–7.5)
NEUTS SEG NFR BLD: 60 % (ref 50–65)
PLATELET # BLD AUTO: 291 K/UL (ref 130–400)
PMV BLD AUTO: 9.4 FL (ref 9.4–12.3)
POTASSIUM SERPL-SCNC: 4.9 MMOL/L (ref 3.5–5.1)
PROT SERPL-MCNC: 6.7 G/DL (ref 6.4–8.3)
RBC # BLD AUTO: 3.92 M/UL (ref 4.2–5.4)
SODIUM SERPL-SCNC: 135 MMOL/L (ref 136–145)
TIBC SERPL-MCNC: 313 UG/DL (ref 250–400)
VIT B12 SERPL-MCNC: 421 PG/ML (ref 232–1245)
WBC # BLD AUTO: 7.18 K/UL (ref 4.8–10.8)

## 2025-03-31 PROCEDURE — 99205 OFFICE O/P NEW HI 60 MIN: CPT | Performed by: NURSE PRACTITIONER

## 2025-03-31 PROCEDURE — 1036F TOBACCO NON-USER: CPT | Performed by: NURSE PRACTITIONER

## 2025-03-31 PROCEDURE — 77075 RADEX OSSEOUS SURVEY COMPL: CPT

## 2025-03-31 PROCEDURE — 36415 COLL VENOUS BLD VENIPUNCTURE: CPT

## 2025-03-31 PROCEDURE — 82728 ASSAY OF FERRITIN: CPT

## 2025-03-31 PROCEDURE — 99213 OFFICE O/P EST LOW 20 MIN: CPT

## 2025-03-31 PROCEDURE — 1123F ACP DISCUSS/DSCN MKR DOCD: CPT | Performed by: NURSE PRACTITIONER

## 2025-03-31 PROCEDURE — G8427 DOCREV CUR MEDS BY ELIG CLIN: HCPCS | Performed by: NURSE PRACTITIONER

## 2025-03-31 PROCEDURE — G8420 CALC BMI NORM PARAMETERS: HCPCS | Performed by: NURSE PRACTITIONER

## 2025-03-31 PROCEDURE — 80053 COMPREHEN METABOLIC PANEL: CPT

## 2025-03-31 PROCEDURE — 83615 LACTATE (LD) (LDH) ENZYME: CPT

## 2025-03-31 PROCEDURE — 1159F MED LIST DOCD IN RCRD: CPT | Performed by: NURSE PRACTITIONER

## 2025-03-31 PROCEDURE — 83550 IRON BINDING TEST: CPT

## 2025-03-31 PROCEDURE — 82746 ASSAY OF FOLIC ACID SERUM: CPT

## 2025-03-31 PROCEDURE — 83540 ASSAY OF IRON: CPT

## 2025-03-31 PROCEDURE — 1125F AMNT PAIN NOTED PAIN PRSNT: CPT | Performed by: NURSE PRACTITIONER

## 2025-03-31 PROCEDURE — 1090F PRES/ABSN URINE INCON ASSESS: CPT | Performed by: NURSE PRACTITIONER

## 2025-03-31 PROCEDURE — 85025 COMPLETE CBC W/AUTO DIFF WBC: CPT

## 2025-03-31 PROCEDURE — 82607 VITAMIN B-12: CPT

## 2025-03-31 PROCEDURE — 82232 ASSAY OF BETA-2 PROTEIN: CPT

## 2025-03-31 ASSESSMENT — ENCOUNTER SYMPTOMS
BACK PAIN: 1
WHEEZING: 0
NAUSEA: 0
BLOOD IN STOOL: 0
VOMITING: 0
ABDOMINAL PAIN: 0
CONSTIPATION: 0
GASTROINTESTINAL NEGATIVE: 1
SHORTNESS OF BREATH: 1
DIARRHEA: 0
COUGH: 0
EYE REDNESS: 0
EYE DISCHARGE: 0
EYE PAIN: 0
SORE THROAT: 0

## 2025-04-02 ENCOUNTER — TELEPHONE (OUTPATIENT)
Dept: HEMATOLOGY | Age: 89
End: 2025-04-02

## 2025-04-02 LAB — B2 MICROGLOB SERPL-MCNC: 4.1 MG/L

## 2025-04-02 NOTE — TELEPHONE ENCOUNTER
Left message [facility] 6:20 [facility]. Nothing to eat or drink after midnight.

## 2025-04-23 ENCOUNTER — OFFICE VISIT (OUTPATIENT)
Dept: NEUROLOGY | Age: 89
End: 2025-04-23
Payer: MEDICARE

## 2025-04-23 VITALS
SYSTOLIC BLOOD PRESSURE: 134 MMHG | BODY MASS INDEX: 22.32 KG/M2 | HEIGHT: 63 IN | DIASTOLIC BLOOD PRESSURE: 63 MMHG | HEART RATE: 59 BPM | WEIGHT: 126 LBS | OXYGEN SATURATION: 98 %

## 2025-04-23 DIAGNOSIS — F03.A3 MILD DEMENTIA WITH MOOD DISTURBANCE, UNSPECIFIED DEMENTIA TYPE (HCC): Primary | ICD-10-CM

## 2025-04-23 DIAGNOSIS — I67.9 CEREBROVASCULAR SMALL VESSEL DISEASE: ICD-10-CM

## 2025-04-23 DIAGNOSIS — Z79.899 MEDICATION MANAGEMENT: ICD-10-CM

## 2025-04-23 PROCEDURE — 1036F TOBACCO NON-USER: CPT | Performed by: NURSE PRACTITIONER

## 2025-04-23 PROCEDURE — 1090F PRES/ABSN URINE INCON ASSESS: CPT | Performed by: NURSE PRACTITIONER

## 2025-04-23 PROCEDURE — 1159F MED LIST DOCD IN RCRD: CPT | Performed by: NURSE PRACTITIONER

## 2025-04-23 PROCEDURE — 1123F ACP DISCUSS/DSCN MKR DOCD: CPT | Performed by: NURSE PRACTITIONER

## 2025-04-23 PROCEDURE — G8427 DOCREV CUR MEDS BY ELIG CLIN: HCPCS | Performed by: NURSE PRACTITIONER

## 2025-04-23 PROCEDURE — 1160F RVW MEDS BY RX/DR IN RCRD: CPT | Performed by: NURSE PRACTITIONER

## 2025-04-23 PROCEDURE — G8420 CALC BMI NORM PARAMETERS: HCPCS | Performed by: NURSE PRACTITIONER

## 2025-04-23 PROCEDURE — 99214 OFFICE O/P EST MOD 30 MIN: CPT | Performed by: NURSE PRACTITIONER
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
## 2025-05-29 ENCOUNTER — TELEPHONE (OUTPATIENT)
Dept: HEMATOLOGY | Age: 89
End: 2025-05-29

## 2025-05-29 NOTE — TELEPHONE ENCOUNTER
Called pt. to remind them of appointment on 06/03/2025 and had to leave a detailed voicemail with appointment date and time. Reminded patient to just come at appointment time, and to not come at the lab appointment time. We have now moved to the [facility] [place] that is located between our old office and the ER at the [facility]. Letting the Pt know that our front entrance faces the [place] and leaving our address. Reminded pt to eat well and be well hydrated for their labs.

## 2025-06-02 DIAGNOSIS — Z86.2 HX OF IRON DEFICIENCY ANEMIA: ICD-10-CM

## 2025-06-02 DIAGNOSIS — R77.8 ABNORMAL SPEP: Primary | ICD-10-CM

## 2025-06-02 NOTE — PROGRESS NOTES
Progress Note      Pt Name: Shalini Randolph  YOB: 1934  MRN: [id number]    Date of evaluation: 6/3/2025  History Obtained From:  patient, electronic medical record    CHIEF COMPLAINT:    Chief Complaint   Patient presents with    Follow-up     Follow up- to go over lab work- Patient states she is not having any issues  Abnormal SPEP       HISTORY OF PRESENT ILLNESS:    Shalini Randolph is a 89 y.o.  female is currently under evaluation for monoclonal gammopathy of unknown significance (MGUS) with findings to include abnormal urine protein electrophoresis: 24-hour UPEP on 3/20/2025 with an M spike of 37.8% and positive for Bence-Bhatia proteins with serology studies reporting kappa light chains 121.9 with a kappa lambda ratio of 11.83 on serology studies.  She has no lytic bone lesions.  She has chronic kidney disease stage IIIa with mild anemia and no known history of hypercalcemia.  She has declined moving forward with bone marrow aspiration biopsy despite multiple conversations.  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  Julio returns today in scheduled follow-up for evaluation, lab monitoring and further treatment recommendations.  She ws accompanied by her daughter.  History of Present Illness  She reports no new symptoms, including rectal bleeding or abdominal cramping. She experiences intermittent back pain, which she attributes to arthritis and weather changes. This pain does not interfere with daily activities and has been managed with a heating pad for several years. Recently, she has spent more time in bed than usual due to fatigue and back pain.    Her weight has remained relatively stable, with a slight fluctuation over

## 2025-06-03 ENCOUNTER — OFFICE VISIT (OUTPATIENT)
Dept: HEMATOLOGY | Age: 89
End: 2025-06-03
Payer: MEDICARE

## 2025-06-03 ENCOUNTER — HOSPITAL ENCOUNTER (OUTPATIENT)
Dept: INFUSION THERAPY | Age: 89
Discharge: HOME | End: 2025-06-03
Payer: MEDICARE

## 2025-06-03 VITALS
BODY MASS INDEX: 22.96 KG/M2 | HEIGHT: 63 IN | WEIGHT: 129.6 LBS | TEMPERATURE: 96.9 F | HEART RATE: 72 BPM | DIASTOLIC BLOOD PRESSURE: 74 MMHG | OXYGEN SATURATION: 97 % | SYSTOLIC BLOOD PRESSURE: 118 MMHG

## 2025-06-03 DIAGNOSIS — N18.31 STAGE 3A CHRONIC KIDNEY DISEASE (HCC): ICD-10-CM

## 2025-06-03 DIAGNOSIS — Z86.2 HX OF IRON DEFICIENCY ANEMIA: ICD-10-CM

## 2025-06-03 DIAGNOSIS — Z85.3 PERSONAL HISTORY OF BREAST CANCER: ICD-10-CM

## 2025-06-03 DIAGNOSIS — D47.2 MONOCLONAL GAMMOPATHY OF UNDETERMINED SIGNIFICANCE (MGUS): Primary | ICD-10-CM

## 2025-06-03 DIAGNOSIS — R77.8 ABNORMAL SPEP: ICD-10-CM

## 2025-06-03 LAB
ALBUMIN SERPL-MCNC: 4 G/DL (ref 3.5–5.2)
ALP SERPL-CCNC: 90 U/L (ref 35–104)
ALT SERPL-CCNC: 9 U/L (ref 5–33)
ANION GAP SERPL CALCULATED.3IONS-SCNC: 7 MMOL/L (ref 7–19)
AST SERPL-CCNC: 22 U/L (ref 5–32)
BASOPHILS # BLD: 0.06 K/UL (ref 0–0.2)
BASOPHILS NFR BLD: 0.8 % (ref 0–1)
BILIRUB SERPL-MCNC: 0.3 MG/DL (ref 0–1.2)
BUN SERPL-MCNC: 17 MG/DL (ref 8–23)
CALCIUM SERPL-MCNC: 9.3 MG/DL (ref 8.2–9.6)
CHLORIDE SERPL-SCNC: 100 MMOL/L (ref 98–107)
CO2 SERPL-SCNC: 27 MMOL/L (ref 22–29)
CREAT SERPL-MCNC: 1.1 MG/DL (ref 0.5–0.9)
EOSINOPHIL # BLD: 0.37 K/UL (ref 0–0.6)
EOSINOPHIL NFR BLD: 5 % (ref 0–5)
ERYTHROCYTE [DISTWIDTH] IN BLOOD BY AUTOMATED COUNT: 19.7 % (ref 11.5–14.5)
FERRITIN SERPL-MCNC: 121 NG/ML (ref 13–150)
GLUCOSE SERPL-MCNC: 101 MG/DL (ref 70–99)
HCT VFR BLD AUTO: 34.3 % (ref 37–47)
HGB BLD-MCNC: 11 G/DL (ref 12–16)
IRON SATN MFR SERPL: 38 % (ref 15–50)
IRON SERPL-MCNC: 101 UG/DL (ref 37–145)
LYMPHOCYTES # BLD: 2.06 K/UL (ref 1.1–4.5)
LYMPHOCYTES NFR BLD: 27.9 % (ref 20–40)
MCH RBC QN AUTO: 29.2 PG (ref 27–31)
MCHC RBC AUTO-ENTMCNC: 32.1 G/DL (ref 33–37)
MCV RBC AUTO: 91 FL (ref 81–99)
MONOCYTES # BLD: 0.89 K/UL (ref 0–0.9)
MONOCYTES NFR BLD: 12 % (ref 1–10)
NEUTROPHILS # BLD: 3.98 K/UL (ref 1.5–7.5)
NEUTS SEG NFR BLD: 53.9 % (ref 50–65)
PLATELET # BLD AUTO: 278 K/UL (ref 130–400)
PMV BLD AUTO: 9.2 FL (ref 9.4–12.3)
POTASSIUM SERPL-SCNC: 4.5 MMOL/L (ref 3.5–5.1)
PROT SERPL-MCNC: 6.2 G/DL (ref 6.4–8.3)
RBC # BLD AUTO: 3.77 M/UL (ref 4.2–5.4)
SODIUM SERPL-SCNC: 134 MMOL/L (ref 136–145)
TIBC SERPL-MCNC: 268 UG/DL (ref 250–400)
WBC # BLD AUTO: 7.39 K/UL (ref 4.8–10.8)

## 2025-06-03 PROCEDURE — G8427 DOCREV CUR MEDS BY ELIG CLIN: HCPCS | Performed by: NURSE PRACTITIONER

## 2025-06-03 PROCEDURE — 1123F ACP DISCUSS/DSCN MKR DOCD: CPT | Performed by: NURSE PRACTITIONER

## 2025-06-03 PROCEDURE — 99213 OFFICE O/P EST LOW 20 MIN: CPT

## 2025-06-03 PROCEDURE — 85025 COMPLETE CBC W/AUTO DIFF WBC: CPT

## 2025-06-03 PROCEDURE — 80053 COMPREHEN METABOLIC PANEL: CPT

## 2025-06-03 PROCEDURE — 83550 IRON BINDING TEST: CPT

## 2025-06-03 PROCEDURE — 82784 ASSAY IGA/IGD/IGG/IGM EACH: CPT

## 2025-06-03 PROCEDURE — 1090F PRES/ABSN URINE INCON ASSESS: CPT | Performed by: NURSE PRACTITIONER

## 2025-06-03 PROCEDURE — 1036F TOBACCO NON-USER: CPT | Performed by: NURSE PRACTITIONER

## 2025-06-03 PROCEDURE — 1126F AMNT PAIN NOTED NONE PRSNT: CPT | Performed by: NURSE PRACTITIONER

## 2025-06-03 PROCEDURE — 82728 ASSAY OF FERRITIN: CPT

## 2025-06-03 PROCEDURE — 83521 IG LIGHT CHAINS FREE EACH: CPT

## 2025-06-03 PROCEDURE — 83883 ASSAY NEPHELOMETRY NOT SPEC: CPT

## 2025-06-03 PROCEDURE — 36415 COLL VENOUS BLD VENIPUNCTURE: CPT

## 2025-06-03 PROCEDURE — 86334 IMMUNOFIX E-PHORESIS SERUM: CPT

## 2025-06-03 PROCEDURE — 83540 ASSAY OF IRON: CPT

## 2025-06-03 PROCEDURE — 84165 PROTEIN E-PHORESIS SERUM: CPT

## 2025-06-03 PROCEDURE — 84155 ASSAY OF PROTEIN SERUM: CPT

## 2025-06-03 PROCEDURE — G8420 CALC BMI NORM PARAMETERS: HCPCS | Performed by: NURSE PRACTITIONER

## 2025-06-03 PROCEDURE — 82232 ASSAY OF BETA-2 PROTEIN: CPT

## 2025-06-03 PROCEDURE — 1159F MED LIST DOCD IN RCRD: CPT | Performed by: NURSE PRACTITIONER

## 2025-06-03 PROCEDURE — 99214 OFFICE O/P EST MOD 30 MIN: CPT | Performed by: NURSE PRACTITIONER

## 2025-06-03 ASSESSMENT — ENCOUNTER SYMPTOMS: BACK PAIN: 1

## 2025-06-04 ENCOUNTER — RESULTS FOLLOW-UP (OUTPATIENT)
Dept: HEMATOLOGY | Age: 89
End: 2025-06-04

## 2025-06-05 LAB — B2 MICROGLOB SERPL-MCNC: 3 MG/L

## 2025-06-05 ASSESSMENT — ENCOUNTER SYMPTOMS
RESPIRATORY NEGATIVE: 1
WHEEZING: 0
DIARRHEA: 0
ABDOMINAL PAIN: 0
EYE PAIN: 0
VOMITING: 0
EYE REDNESS: 0
NAUSEA: 0
BLOOD IN STOOL: 0
SORE THROAT: 0
COUGH: 0
GASTROINTESTINAL NEGATIVE: 1
SHORTNESS OF BREATH: 0
CONSTIPATION: 0
EYES NEGATIVE: 1
EYE DISCHARGE: 0

## 2025-06-07 LAB
ALBUMIN SERPL-MCNC: 3.76 G/DL (ref 3.75–5.01)
ALPHA1 GLOB SERPL ELPH-MCNC: 0.3 G/DL (ref 0.19–0.46)
ALPHA2 GLOB SERPL ELPH-MCNC: 0.81 G/DL (ref 0.48–1.05)
B-GLOBULIN SERPL ELPH-MCNC: 0.62 G/DL (ref 0.48–1.1)
DEPRECATED KAPPA LC FREE/LAMBDA SER: 12.86 {RATIO} (ref 0.26–1.65)
EER MONOCLONAL PROTEIN AND FLC, SERUM: ABNORMAL
GAMMA GLOB SERPL ELPH-MCNC: 0.62 G/DL (ref 0.62–1.51)
IGA SERPL-MCNC: 72 MG/DL (ref 68–408)
IGG SERPL-MCNC: 584 MG/DL (ref 768–1632)
IGM SERPL-MCNC: 68 MG/DL (ref 35–263)
INTERPRETATION SERPL IFE-IMP: ABNORMAL
INTERPRETATION SERPL IFE-IMP: ABNORMAL
KAPPA LC FREE SER-MCNC: 125.02 MG/L (ref 3.3–19.4)
LAMBDA LC FREE SERPL-MCNC: 9.72 MG/L (ref 5.71–26.3)
MONOCLONAL PROTEIN, SERUM: ABNORMAL G/DL
PROT SERPL-MCNC: 6.1 G/DL (ref 6.3–8.2)

## (undated) DEVICE — GLOVE SURG SZ 75 L12IN FNGR THK87MIL DK GRN LTX FREE ISOLEX

## (undated) DEVICE — SUTURE VCRL SZ 2-0 L36IN ABSRB UD L36MM CT-1 1/2 CIR J945H

## (undated) DEVICE — Device: Brand: POWER-FLO®

## (undated) DEVICE — SCREW BNE L25MM DIA2.5MM KNEE FULL THRD HEX FEM PERSONA

## (undated) DEVICE — SOLUTION IV IRRIG WATER 1000ML POUR BRL 2F7114

## (undated) DEVICE — ZIMMER® STERILE DISPOSABLE TOURNIQUET CUFF WITH PLC, DUAL PORT, SINGLE BLADDER, 34 IN. (86 CM)

## (undated) DEVICE — TRAY EPI 25GA L3.5IN 0.75% BIPIVCAIN 8.25% D CONTAIN BPA

## (undated) DEVICE — BAND FLX MSTR STD STR 6IN

## (undated) DEVICE — SOLUTION IV IRRIG POUR BRL 0.9% SODIUM CHL 2F7124

## (undated) DEVICE — THREE QUARTER SHEET: Brand: CONVERTORS

## (undated) DEVICE — LARGE BONE HALL BLADE, RECIPROCATOR, 12.5 X 76 X 1.27 MM: Brand: HALL

## (undated) DEVICE — ADHESIVE SKIN CLSR 0.7ML TOP DERMBND ADV

## (undated) DEVICE — GLOVE SURG SZ 85 L12IN FNGR THK13MIL BRN LTX SYN POLYMER W

## (undated) DEVICE — CHLORAPREP 26ML ORANGE

## (undated) DEVICE — SOLUTION IRRIG 1000ML 09% SOD CHL USP PIC PLAS CONTAINER

## (undated) DEVICE — OSCILLATOR BLADE, 25.4 X 90 X 1.27 MM (.050"): Brand: CONMED

## (undated) DEVICE — SURGICAL PROCEDURE PACK KNEE TOT DBD CDS LOURDES HOSP LF

## (undated) DEVICE — GLOVE SURG SZ 75 L12IN FNGR THK94MIL TRNSLUC YEL LTX

## (undated) DEVICE — DISCONTINUED NO SUB IDED TG GLOVE SURG SENSICARE ALOE LT LF PF ST GRN SZ 8

## (undated) DEVICE — TOWEL,OR,DSP,ST,BLUE,DLX,4/PK,20PK/CS: Brand: MEDLINE

## (undated) DEVICE — SUTURE MCRYL SZ 3-0 L18IN ABSRB UD L19MM PS-2 3/8 CIR PRIM Y497G

## (undated) DEVICE — ABDOMINAL PAD: Brand: DERMACEA

## (undated) DEVICE — SUTURE ABSORBABLE MONOFILAMENT 1-0 OS8 14 IN STRATAFIX SPRL SXPD2B202

## (undated) DEVICE — GAUZE,SPONGE,FLUFF,6"X6.75",STRL,10/TRAY: Brand: MEDLINE

## (undated) DEVICE — GLOVE SURG SZ 85 L12IN FNGR THK87MIL DK GRN LTX FREE ISOLEX

## (undated) DEVICE — SOLUTION IV 1000ML 0.9% SOD CHL PH 5 INJ USP VIAFLX PLAS